# Patient Record
Sex: FEMALE | Race: WHITE | NOT HISPANIC OR LATINO | Employment: PART TIME | URBAN - METROPOLITAN AREA
[De-identification: names, ages, dates, MRNs, and addresses within clinical notes are randomized per-mention and may not be internally consistent; named-entity substitution may affect disease eponyms.]

---

## 2017-02-20 ENCOUNTER — COMMUNICATION - HEALTHEAST (OUTPATIENT)
Dept: FAMILY MEDICINE | Facility: CLINIC | Age: 32
End: 2017-02-20

## 2017-02-21 ENCOUNTER — COMMUNICATION - HEALTHEAST (OUTPATIENT)
Dept: FAMILY MEDICINE | Facility: CLINIC | Age: 32
End: 2017-02-21

## 2017-02-22 ENCOUNTER — OFFICE VISIT - HEALTHEAST (OUTPATIENT)
Dept: FAMILY MEDICINE | Facility: CLINIC | Age: 32
End: 2017-02-22

## 2017-02-22 DIAGNOSIS — R53.83 FATIGUE: ICD-10-CM

## 2017-02-22 DIAGNOSIS — T14.8XXA BRUISING: ICD-10-CM

## 2017-02-23 LAB
BASOPHILS # BLD AUTO: 0.1 THOU/UL (ref 0–0.2)
BASOPHILS NFR BLD AUTO: 1 % (ref 0–2)
EOSINOPHIL # BLD AUTO: 0.1 THOU/UL (ref 0–0.4)
EOSINOPHIL NFR BLD AUTO: 2 % (ref 0–6)
ERYTHROCYTE [DISTWIDTH] IN BLOOD BY AUTOMATED COUNT: 12.2 % (ref 11–14.5)
HCT VFR BLD AUTO: 43.2 % (ref 35–47)
HGB BLD-MCNC: 13.9 G/DL (ref 12–16)
LAB AP CHARGES (HE HISTORICAL CONVERSION): NORMAL
LYMPHOCYTES # BLD AUTO: 2.3 THOU/UL (ref 0.8–4.4)
LYMPHOCYTES NFR BLD AUTO: 32 % (ref 20–40)
MCH RBC QN AUTO: 29.8 PG (ref 27–34)
MCHC RBC AUTO-ENTMCNC: 32.2 G/DL (ref 32–36)
MCV RBC AUTO: 93 FL (ref 80–100)
MONOCYTES # BLD AUTO: 0.5 THOU/UL (ref 0–0.9)
MONOCYTES NFR BLD AUTO: 7 % (ref 2–10)
NEUTROPHILS # BLD AUTO: 4 THOU/UL (ref 2–7.7)
NEUTROPHILS NFR BLD AUTO: 58 % (ref 50–70)
PATH REPORT.COMMENTS IMP SPEC: NORMAL
PATH REPORT.COMMENTS IMP SPEC: NORMAL
PATH REPORT.FINAL DX SPEC: NORMAL
PATH REPORT.MICROSCOPIC SPEC OTHER STN: ABNORMAL
PATH REPORT.MICROSCOPIC SPEC OTHER STN: NORMAL
PATH REPORT.RELEVANT HX SPEC: NORMAL
PLATELET # BLD AUTO: 275 THOU/UL (ref 140–440)
PMV BLD AUTO: 9.7 FL (ref 8.5–12.5)
RBC # BLD AUTO: 4.67 MILL/UL (ref 3.8–5.4)
WBC: 7 THOU/UL (ref 4–11)

## 2017-03-27 ENCOUNTER — COMMUNICATION - HEALTHEAST (OUTPATIENT)
Dept: FAMILY MEDICINE | Facility: CLINIC | Age: 32
End: 2017-03-27

## 2017-03-28 ENCOUNTER — AMBULATORY - HEALTHEAST (OUTPATIENT)
Dept: BEHAVIORAL HEALTH | Facility: CLINIC | Age: 32
End: 2017-03-28

## 2017-05-09 ENCOUNTER — COMMUNICATION - HEALTHEAST (OUTPATIENT)
Dept: SCHEDULING | Facility: CLINIC | Age: 32
End: 2017-05-09

## 2017-05-16 ENCOUNTER — AMBULATORY - HEALTHEAST (OUTPATIENT)
Dept: FAMILY MEDICINE | Facility: CLINIC | Age: 32
End: 2017-05-16

## 2017-05-16 ENCOUNTER — COMMUNICATION - HEALTHEAST (OUTPATIENT)
Dept: SCHEDULING | Facility: CLINIC | Age: 32
End: 2017-05-16

## 2017-08-03 ENCOUNTER — COMMUNICATION - HEALTHEAST (OUTPATIENT)
Dept: FAMILY MEDICINE | Facility: CLINIC | Age: 32
End: 2017-08-03

## 2017-08-16 ENCOUNTER — COMMUNICATION - HEALTHEAST (OUTPATIENT)
Dept: FAMILY MEDICINE | Facility: CLINIC | Age: 32
End: 2017-08-16

## 2017-08-16 DIAGNOSIS — Z91.09 OTHER ALLERGY, OTHER THAN TO MEDICINAL AGENTS: ICD-10-CM

## 2017-11-15 ENCOUNTER — COMMUNICATION - HEALTHEAST (OUTPATIENT)
Dept: FAMILY MEDICINE | Facility: CLINIC | Age: 32
End: 2017-11-15

## 2017-11-20 ENCOUNTER — OFFICE VISIT - HEALTHEAST (OUTPATIENT)
Dept: FAMILY MEDICINE | Facility: CLINIC | Age: 32
End: 2017-11-20

## 2017-11-20 DIAGNOSIS — N92.6 MISSED MENSES: ICD-10-CM

## 2017-11-21 ENCOUNTER — RECORDS - HEALTHEAST (OUTPATIENT)
Dept: ADMINISTRATIVE | Facility: OTHER | Age: 32
End: 2017-11-21

## 2017-11-21 ENCOUNTER — HOSPITAL ENCOUNTER (OUTPATIENT)
Dept: ULTRASOUND IMAGING | Facility: HOSPITAL | Age: 32
Discharge: HOME | End: 2017-11-21
Attending: FAMILY MEDICINE

## 2017-11-21 DIAGNOSIS — N92.6 MISSED MENSES: ICD-10-CM

## 2017-11-22 ENCOUNTER — AMBULATORY - HEALTHEAST (OUTPATIENT)
Dept: FAMILY MEDICINE | Facility: CLINIC | Age: 32
End: 2017-11-22

## 2017-11-22 DIAGNOSIS — N92.6 MISSED MENSES: ICD-10-CM

## 2017-12-07 ENCOUNTER — HOSPITAL ENCOUNTER (OUTPATIENT)
Dept: ULTRASOUND IMAGING | Facility: HOSPITAL | Age: 32
Discharge: HOME | End: 2017-12-07
Attending: FAMILY MEDICINE

## 2017-12-07 DIAGNOSIS — N92.6 MISSED MENSES: ICD-10-CM

## 2018-01-03 ENCOUNTER — RECORDS - HEALTHEAST (OUTPATIENT)
Dept: ADMINISTRATIVE | Facility: OTHER | Age: 33
End: 2018-01-03

## 2018-01-03 ENCOUNTER — PRENATAL OFFICE VISIT - HEALTHEAST (OUTPATIENT)
Dept: FAMILY MEDICINE | Facility: CLINIC | Age: 33
End: 2018-01-03

## 2018-01-03 DIAGNOSIS — Z34.90 PREGNANCY: ICD-10-CM

## 2018-01-03 LAB
ALBUMIN UR-MCNC: ABNORMAL MG/DL
APPEARANCE UR: CLEAR
BASOPHILS # BLD AUTO: 0 THOU/UL (ref 0–0.2)
BASOPHILS NFR BLD AUTO: 1 % (ref 0–2)
BILIRUB UR QL STRIP: NEGATIVE
CLUE CELLS: NORMAL
COLOR UR AUTO: YELLOW
EOSINOPHIL # BLD AUTO: 0.1 THOU/UL (ref 0–0.4)
EOSINOPHIL NFR BLD AUTO: 1 % (ref 0–6)
ERYTHROCYTE [DISTWIDTH] IN BLOOD BY AUTOMATED COUNT: 11.9 % (ref 11–14.5)
GLUCOSE UR STRIP-MCNC: NEGATIVE MG/DL
HCT VFR BLD AUTO: 38.9 % (ref 35–47)
HGB BLD-MCNC: 13.1 G/DL (ref 12–16)
HGB UR QL STRIP: NEGATIVE
HIV 1+2 AB+HIV1 P24 AG SERPL QL IA: NEGATIVE
KETONES UR STRIP-MCNC: NEGATIVE MG/DL
LEUKOCYTE ESTERASE UR QL STRIP: NEGATIVE
LYMPHOCYTES # BLD AUTO: 1.9 THOU/UL (ref 0.8–4.4)
LYMPHOCYTES NFR BLD AUTO: 23 % (ref 20–40)
MCH RBC QN AUTO: 30.8 PG (ref 27–34)
MCHC RBC AUTO-ENTMCNC: 33.7 G/DL (ref 32–36)
MCV RBC AUTO: 92 FL (ref 80–100)
MONOCYTES # BLD AUTO: 0.5 THOU/UL (ref 0–0.9)
MONOCYTES NFR BLD AUTO: 6 % (ref 2–10)
NEUTROPHILS # BLD AUTO: 5.7 THOU/UL (ref 2–7.7)
NEUTROPHILS NFR BLD AUTO: 70 % (ref 50–70)
NITRATE UR QL: NEGATIVE
PH UR STRIP: 7 [PH] (ref 5–8)
PLATELET # BLD AUTO: 221 THOU/UL (ref 140–440)
PMV BLD AUTO: 9.7 FL (ref 8.5–12.5)
RBC # BLD AUTO: 4.25 MILL/UL (ref 3.8–5.4)
SP GR UR STRIP: 1.02 (ref 1–1.03)
TRICHOMONAS, WET PREP: NORMAL
UROBILINOGEN UR STRIP-ACNC: ABNORMAL
WBC: 8.2 THOU/UL (ref 4–11)
YEAST, WET PREP: NORMAL

## 2018-01-04 LAB
25(OH)D3 SERPL-MCNC: 32.4 NG/ML (ref 30–80)
25(OH)D3 SERPL-MCNC: 32.4 NG/ML (ref 30–80)
ABO/RH(D): NORMAL
ABORH REPEAT: NORMAL
ANTIBODY SCREEN: NEGATIVE
BACTERIA SPEC CULT: NO GROWTH
C TRACH DNA SPEC QL PROBE+SIG AMP: NEGATIVE
HBV SURFACE AG SERPL QL IA: NEGATIVE
N GONORRHOEA DNA SPEC QL NAA+PROBE: NEGATIVE
RUBV IGG SERPL QL IA: NORMAL
SYPHILIS RPR SCREEN - HISTORICAL: NORMAL

## 2018-01-08 ENCOUNTER — COMMUNICATION - HEALTHEAST (OUTPATIENT)
Dept: FAMILY MEDICINE | Facility: CLINIC | Age: 33
End: 2018-01-08

## 2018-01-08 LAB
HUMAN PAPILLOMA VIRUS 16 DNA: NEGATIVE
HUMAN PAPILLOMA VIRUS 18 DNA: NEGATIVE
HUMAN PAPILLOMA VIRUS FINAL DIAGNOSIS: ABNORMAL
HUMAN PAPILLOMA VIRUS OTHER HR: POSITIVE
SPECIMEN DESCRIPTION: ABNORMAL

## 2018-01-09 LAB
BKR LAB AP ABNORMAL BLEEDING: NO
BKR LAB AP BIRTH CONTROL/HORMONES: NORMAL
BKR LAB AP CERVICAL APPEARANCE: NORMAL
BKR LAB AP GYN ADEQUACY: NORMAL
BKR LAB AP GYN INTERPRETATION: NORMAL
BKR LAB AP HPV REFLEX: NORMAL
BKR LAB AP LMP: NORMAL
BKR LAB AP PATIENT STATUS: NORMAL
BKR LAB AP PREVIOUS ABNORMAL: NO
BKR LAB AP PREVIOUS NORMAL: NORMAL
HIGH RISK?: NO
PATH REPORT.COMMENTS IMP SPEC: NORMAL
RESULT FLAG (HE HISTORICAL CONVERSION): NORMAL

## 2018-01-10 ENCOUNTER — COMMUNICATION - HEALTHEAST (OUTPATIENT)
Dept: FAMILY MEDICINE | Facility: CLINIC | Age: 33
End: 2018-01-10

## 2018-01-16 ENCOUNTER — COMMUNICATION - HEALTHEAST (OUTPATIENT)
Dept: FAMILY MEDICINE | Facility: CLINIC | Age: 33
End: 2018-01-16

## 2018-02-01 ENCOUNTER — PRENATAL OFFICE VISIT - HEALTHEAST (OUTPATIENT)
Dept: FAMILY MEDICINE | Facility: CLINIC | Age: 33
End: 2018-02-01

## 2018-02-01 DIAGNOSIS — Z34.90 PREGNANCY: ICD-10-CM

## 2018-02-01 LAB
ALBUMIN UR-MCNC: NEGATIVE MG/DL
APPEARANCE UR: CLEAR
BILIRUB UR QL STRIP: NEGATIVE
COLOR UR AUTO: YELLOW
GLUCOSE UR STRIP-MCNC: NEGATIVE MG/DL
HGB UR QL STRIP: NEGATIVE
KETONES UR STRIP-MCNC: NEGATIVE MG/DL
LEUKOCYTE ESTERASE UR QL STRIP: NEGATIVE
NITRATE UR QL: NEGATIVE
PH UR STRIP: 7 [PH] (ref 5–8)
SP GR UR STRIP: 1.01 (ref 1–1.03)
UROBILINOGEN UR STRIP-ACNC: NORMAL

## 2018-02-05 LAB
ALPHA FETO PROTEIN: NORMAL
CALCULATED AGE AT EDD: NORMAL
EDD BY U/S SCAN: NORMAL
GA ON COLLECTION BY U/S SCAN: NORMAL WK,D
GA USED IN RISK ESTIMATE: NORMAL
GENERAL TEST INFORMATION: NORMAL
INSULIN DIABETIC: NORMAL
INTERPRETATION: NORMAL
OTHER INFORMATION: NORMAL
PATIENT WEIGHT: 205 LBS
RACE OF MOTHER: NORMAL
RECOMMENDED FOLLOW UP: NORMAL

## 2018-02-22 ENCOUNTER — RECORDS - HEALTHEAST (OUTPATIENT)
Dept: ADMINISTRATIVE | Facility: OTHER | Age: 33
End: 2018-02-22

## 2018-03-01 ENCOUNTER — PRENATAL OFFICE VISIT - HEALTHEAST (OUTPATIENT)
Dept: FAMILY MEDICINE | Facility: CLINIC | Age: 33
End: 2018-03-01

## 2018-03-01 DIAGNOSIS — Z34.90 PREGNANCY: ICD-10-CM

## 2018-03-06 ENCOUNTER — OFFICE VISIT - HEALTHEAST (OUTPATIENT)
Dept: FAMILY MEDICINE | Facility: CLINIC | Age: 33
End: 2018-03-06

## 2018-03-06 DIAGNOSIS — J06.9 URI WITH COUGH AND CONGESTION: ICD-10-CM

## 2018-03-15 ENCOUNTER — RECORDS - HEALTHEAST (OUTPATIENT)
Dept: ADMINISTRATIVE | Facility: OTHER | Age: 33
End: 2018-03-15

## 2018-03-29 ENCOUNTER — PRENATAL OFFICE VISIT - HEALTHEAST (OUTPATIENT)
Dept: FAMILY MEDICINE | Facility: CLINIC | Age: 33
End: 2018-03-29

## 2018-03-29 DIAGNOSIS — Z34.90 PREGNANCY: ICD-10-CM

## 2018-03-29 LAB
FASTING STATUS PATIENT QL REPORTED: NO
GLUCOSE 1H P 50 G GLC PO SERPL-MCNC: 115 MG/DL (ref 70–139)
HGB BLD-MCNC: 12.2 G/DL (ref 12–16)

## 2018-03-30 LAB — T PALLIDUM AB SER QL: NEGATIVE

## 2018-05-10 ENCOUNTER — PRENATAL OFFICE VISIT - HEALTHEAST (OUTPATIENT)
Dept: FAMILY MEDICINE | Facility: CLINIC | Age: 33
End: 2018-05-10

## 2018-05-10 DIAGNOSIS — Z34.90 PREGNANCY: ICD-10-CM

## 2018-05-24 ENCOUNTER — PRENATAL OFFICE VISIT - HEALTHEAST (OUTPATIENT)
Dept: FAMILY MEDICINE | Facility: CLINIC | Age: 33
End: 2018-05-24

## 2018-05-24 DIAGNOSIS — Z34.90 PREGNANCY: ICD-10-CM

## 2018-05-24 LAB
ALBUMIN UR-MCNC: NEGATIVE MG/DL
APPEARANCE UR: CLEAR
BILIRUB UR QL STRIP: NEGATIVE
COLOR UR AUTO: YELLOW
GLUCOSE UR STRIP-MCNC: NEGATIVE MG/DL
HGB UR QL STRIP: ABNORMAL
KETONES UR STRIP-MCNC: NEGATIVE MG/DL
LEUKOCYTE ESTERASE UR QL STRIP: NEGATIVE
NITRATE UR QL: NEGATIVE
PH UR STRIP: 7 [PH] (ref 5–8)
SP GR UR STRIP: 1.01 (ref 1–1.03)
UROBILINOGEN UR STRIP-ACNC: ABNORMAL

## 2018-06-07 ENCOUNTER — PRENATAL OFFICE VISIT - HEALTHEAST (OUTPATIENT)
Dept: FAMILY MEDICINE | Facility: CLINIC | Age: 33
End: 2018-06-07

## 2018-06-07 DIAGNOSIS — Z34.90 PREGNANCY: ICD-10-CM

## 2018-06-21 ENCOUNTER — PRENATAL OFFICE VISIT - HEALTHEAST (OUTPATIENT)
Dept: FAMILY MEDICINE | Facility: CLINIC | Age: 33
End: 2018-06-21

## 2018-06-21 ENCOUNTER — AMBULATORY - HEALTHEAST (OUTPATIENT)
Dept: FAMILY MEDICINE | Facility: CLINIC | Age: 33
End: 2018-06-21

## 2018-06-21 ENCOUNTER — HOSPITAL ENCOUNTER (OUTPATIENT)
Dept: ULTRASOUND IMAGING | Facility: HOSPITAL | Age: 33
Discharge: HOME | End: 2018-06-21
Attending: FAMILY MEDICINE

## 2018-06-21 DIAGNOSIS — Z34.90 PREGNANCY: ICD-10-CM

## 2018-06-23 LAB
ALLERGIC TO PENICILLIN: NO
GP B STREP DNA SPEC QL NAA+PROBE: NEGATIVE

## 2018-06-25 ENCOUNTER — COMMUNICATION - HEALTHEAST (OUTPATIENT)
Dept: FAMILY MEDICINE | Facility: CLINIC | Age: 33
End: 2018-06-25

## 2018-06-25 ENCOUNTER — HOSPITAL ENCOUNTER (OUTPATIENT)
Dept: ULTRASOUND IMAGING | Facility: HOSPITAL | Age: 33
Discharge: HOME | End: 2018-06-25
Attending: FAMILY MEDICINE

## 2018-06-25 DIAGNOSIS — Z34.90 PREGNANCY: ICD-10-CM

## 2018-06-27 ENCOUNTER — PRENATAL OFFICE VISIT - HEALTHEAST (OUTPATIENT)
Dept: FAMILY MEDICINE | Facility: CLINIC | Age: 33
End: 2018-06-27

## 2018-06-27 DIAGNOSIS — Z34.90 PREGNANCY: ICD-10-CM

## 2018-06-27 LAB
ALBUMIN UR-MCNC: NEGATIVE MG/DL
APPEARANCE UR: CLEAR
BILIRUB UR QL STRIP: NEGATIVE
COLOR UR AUTO: YELLOW
GLUCOSE UR STRIP-MCNC: ABNORMAL MG/DL
HGB BLD-MCNC: 12.6 G/DL (ref 12–16)
HGB UR QL STRIP: ABNORMAL
KETONES UR STRIP-MCNC: NEGATIVE MG/DL
LEUKOCYTE ESTERASE UR QL STRIP: NEGATIVE
NITRATE UR QL: NEGATIVE
PH UR STRIP: 7 [PH] (ref 5–8)
SP GR UR STRIP: 1.01 (ref 1–1.03)
UROBILINOGEN UR STRIP-ACNC: ABNORMAL

## 2018-07-02 ENCOUNTER — COMMUNICATION - HEALTHEAST (OUTPATIENT)
Dept: FAMILY MEDICINE | Facility: CLINIC | Age: 33
End: 2018-07-02

## 2018-07-02 ENCOUNTER — PRENATAL OFFICE VISIT - HEALTHEAST (OUTPATIENT)
Dept: FAMILY MEDICINE | Facility: CLINIC | Age: 33
End: 2018-07-02

## 2018-07-02 ENCOUNTER — HOSPITAL ENCOUNTER (OUTPATIENT)
Dept: ULTRASOUND IMAGING | Facility: CLINIC | Age: 33
Discharge: HOME | End: 2018-07-02
Attending: FAMILY MEDICINE

## 2018-07-02 DIAGNOSIS — Z34.90 PREGNANCY: ICD-10-CM

## 2018-07-02 LAB
ALBUMIN UR-MCNC: NEGATIVE MG/DL
APPEARANCE UR: CLEAR
BILIRUB UR QL STRIP: NEGATIVE
COLOR UR AUTO: YELLOW
GLUCOSE UR STRIP-MCNC: NEGATIVE MG/DL
HGB UR QL STRIP: ABNORMAL
KETONES UR STRIP-MCNC: ABNORMAL MG/DL
LEUKOCYTE ESTERASE UR QL STRIP: NEGATIVE
NITRATE UR QL: NEGATIVE
PH UR STRIP: 7 [PH] (ref 5–8)
SP GR UR STRIP: 1.02 (ref 1–1.03)
UROBILINOGEN UR STRIP-ACNC: ABNORMAL

## 2018-07-09 ENCOUNTER — PRENATAL OFFICE VISIT - HEALTHEAST (OUTPATIENT)
Dept: FAMILY MEDICINE | Facility: CLINIC | Age: 33
End: 2018-07-09

## 2018-07-09 ENCOUNTER — HOSPITAL ENCOUNTER (OUTPATIENT)
Dept: ULTRASOUND IMAGING | Facility: HOSPITAL | Age: 33
Discharge: HOME | End: 2018-07-09
Attending: FAMILY MEDICINE

## 2018-07-09 DIAGNOSIS — Z34.90 PREGNANCY: ICD-10-CM

## 2018-07-09 LAB
ALBUMIN UR-MCNC: NEGATIVE MG/DL
APPEARANCE UR: CLEAR
BILIRUB UR QL STRIP: NEGATIVE
COLOR UR AUTO: YELLOW
GLUCOSE UR STRIP-MCNC: ABNORMAL MG/DL
HGB UR QL STRIP: ABNORMAL
KETONES UR STRIP-MCNC: NEGATIVE MG/DL
LEUKOCYTE ESTERASE UR QL STRIP: NEGATIVE
NITRATE UR QL: NEGATIVE
PH UR STRIP: 7 [PH] (ref 5–8)
SP GR UR STRIP: 1.01 (ref 1–1.03)
UROBILINOGEN UR STRIP-ACNC: ABNORMAL

## 2018-07-12 ENCOUNTER — PRENATAL OFFICE VISIT - HEALTHEAST (OUTPATIENT)
Dept: FAMILY MEDICINE | Facility: CLINIC | Age: 33
End: 2018-07-12

## 2018-07-12 DIAGNOSIS — O32.0XX0: ICD-10-CM

## 2018-07-12 DIAGNOSIS — Z34.90 PREGNANCY: ICD-10-CM

## 2018-07-13 ENCOUNTER — COMMUNICATION - HEALTHEAST (OUTPATIENT)
Dept: FAMILY MEDICINE | Facility: CLINIC | Age: 33
End: 2018-07-13

## 2018-07-19 ENCOUNTER — HOME CARE/HOSPICE - HEALTHEAST (OUTPATIENT)
Dept: HOME HEALTH SERVICES | Facility: HOME HEALTH | Age: 33
End: 2018-07-19

## 2018-07-20 ENCOUNTER — HOME CARE/HOSPICE - HEALTHEAST (OUTPATIENT)
Dept: HOME HEALTH SERVICES | Facility: HOME HEALTH | Age: 33
End: 2018-07-20

## 2018-07-26 ENCOUNTER — COMMUNICATION - HEALTHEAST (OUTPATIENT)
Dept: TELEHEALTH | Facility: CLINIC | Age: 33
End: 2018-07-26

## 2018-07-26 ENCOUNTER — COMMUNICATION - HEALTHEAST (OUTPATIENT)
Dept: HEALTH INFORMATION MANAGEMENT | Facility: CLINIC | Age: 33
End: 2018-07-26

## 2018-07-26 ENCOUNTER — AMBULATORY - HEALTHEAST (OUTPATIENT)
Dept: OBGYN | Facility: HOSPITAL | Age: 33
End: 2018-07-26

## 2018-07-30 ENCOUNTER — COMMUNICATION - HEALTHEAST (OUTPATIENT)
Dept: FAMILY MEDICINE | Facility: CLINIC | Age: 33
End: 2018-07-30

## 2018-07-30 ENCOUNTER — COMMUNICATION - HEALTHEAST (OUTPATIENT)
Dept: OBGYN | Facility: HOSPITAL | Age: 33
End: 2018-07-30

## 2018-07-31 ENCOUNTER — AMBULATORY - HEALTHEAST (OUTPATIENT)
Dept: FAMILY MEDICINE | Facility: CLINIC | Age: 33
End: 2018-07-31

## 2018-07-31 ENCOUNTER — COMMUNICATION - HEALTHEAST (OUTPATIENT)
Dept: OBGYN | Facility: HOSPITAL | Age: 33
End: 2018-07-31

## 2018-08-02 ENCOUNTER — COMMUNICATION - HEALTHEAST (OUTPATIENT)
Dept: OBGYN | Facility: HOSPITAL | Age: 33
End: 2018-08-02

## 2018-08-08 ENCOUNTER — COMMUNICATION - HEALTHEAST (OUTPATIENT)
Dept: OBGYN | Facility: CLINIC | Age: 33
End: 2018-08-08

## 2018-08-15 ENCOUNTER — COMMUNICATION - HEALTHEAST (OUTPATIENT)
Dept: OBGYN | Facility: CLINIC | Age: 33
End: 2018-08-15

## 2018-08-16 ENCOUNTER — COMMUNICATION - HEALTHEAST (OUTPATIENT)
Dept: OBGYN | Facility: HOSPITAL | Age: 33
End: 2018-08-16

## 2018-08-16 ENCOUNTER — COMMUNICATION - HEALTHEAST (OUTPATIENT)
Dept: FAMILY MEDICINE | Facility: CLINIC | Age: 33
End: 2018-08-16

## 2018-08-23 ENCOUNTER — OFFICE VISIT - HEALTHEAST (OUTPATIENT)
Dept: FAMILY MEDICINE | Facility: CLINIC | Age: 33
End: 2018-08-23

## 2018-08-23 ENCOUNTER — COMMUNICATION - HEALTHEAST (OUTPATIENT)
Dept: TELEHEALTH | Facility: CLINIC | Age: 33
End: 2018-08-23

## 2018-08-23 DIAGNOSIS — F41.8 POSTPARTUM ANXIETY: ICD-10-CM

## 2018-08-23 DIAGNOSIS — D22.9 ATYPICAL NEVI: ICD-10-CM

## 2018-08-24 ENCOUNTER — COMMUNICATION - HEALTHEAST (OUTPATIENT)
Dept: FAMILY MEDICINE | Facility: CLINIC | Age: 33
End: 2018-08-24

## 2018-08-27 ENCOUNTER — COMMUNICATION - HEALTHEAST (OUTPATIENT)
Dept: FAMILY MEDICINE | Facility: CLINIC | Age: 33
End: 2018-08-27

## 2018-08-27 LAB
HPV SOURCE: ABNORMAL
HUMAN PAPILLOMA VIRUS 16 DNA: NEGATIVE
HUMAN PAPILLOMA VIRUS 18 DNA: NEGATIVE
HUMAN PAPILLOMA VIRUS FINAL DIAGNOSIS: ABNORMAL
HUMAN PAPILLOMA VIRUS OTHER HR: POSITIVE
SPECIMEN DESCRIPTION: ABNORMAL

## 2018-08-30 ENCOUNTER — COMMUNICATION - HEALTHEAST (OUTPATIENT)
Dept: FAMILY MEDICINE | Facility: CLINIC | Age: 33
End: 2018-08-30

## 2018-09-19 ENCOUNTER — AMBULATORY - HEALTHEAST (OUTPATIENT)
Dept: FAMILY MEDICINE | Facility: CLINIC | Age: 33
End: 2018-09-19

## 2018-09-21 ENCOUNTER — COMMUNICATION - HEALTHEAST (OUTPATIENT)
Dept: FAMILY MEDICINE | Facility: CLINIC | Age: 33
End: 2018-09-21

## 2018-09-26 ENCOUNTER — RECORDS - HEALTHEAST (OUTPATIENT)
Dept: GENERAL RADIOLOGY | Facility: CLINIC | Age: 33
End: 2018-09-26

## 2018-09-26 ENCOUNTER — OFFICE VISIT - HEALTHEAST (OUTPATIENT)
Dept: FAMILY MEDICINE | Facility: CLINIC | Age: 33
End: 2018-09-26

## 2018-09-26 DIAGNOSIS — M25.562 LEFT KNEE PAIN: ICD-10-CM

## 2018-09-26 DIAGNOSIS — M25.562 PAIN IN LEFT KNEE: ICD-10-CM

## 2018-10-31 ENCOUNTER — RECORDS - HEALTHEAST (OUTPATIENT)
Dept: ADMINISTRATIVE | Facility: OTHER | Age: 33
End: 2018-10-31

## 2019-01-21 ENCOUNTER — AMBULATORY - HEALTHEAST (OUTPATIENT)
Dept: FAMILY MEDICINE | Facility: CLINIC | Age: 34
End: 2019-01-21

## 2019-02-10 ENCOUNTER — COMMUNICATION - HEALTHEAST (OUTPATIENT)
Dept: FAMILY MEDICINE | Facility: CLINIC | Age: 34
End: 2019-02-10

## 2019-02-10 DIAGNOSIS — F41.9 ANXIETY: ICD-10-CM

## 2019-03-05 ENCOUNTER — OFFICE VISIT - HEALTHEAST (OUTPATIENT)
Dept: FAMILY MEDICINE | Facility: CLINIC | Age: 34
End: 2019-03-05

## 2019-03-05 DIAGNOSIS — R09.81 CONGESTION OF PARANASAL SINUS: ICD-10-CM

## 2019-03-14 ENCOUNTER — COMMUNICATION - HEALTHEAST (OUTPATIENT)
Dept: FAMILY MEDICINE | Facility: CLINIC | Age: 34
End: 2019-03-14

## 2019-03-14 DIAGNOSIS — F41.9 ANXIETY: ICD-10-CM

## 2019-04-13 ENCOUNTER — OFFICE VISIT - HEALTHEAST (OUTPATIENT)
Dept: FAMILY MEDICINE | Facility: CLINIC | Age: 34
End: 2019-04-13

## 2019-04-13 DIAGNOSIS — J01.00 SUBACUTE MAXILLARY SINUSITIS: ICD-10-CM

## 2019-04-13 DIAGNOSIS — H65.191 ACUTE OTITIS MEDIA WITH EFFUSION OF RIGHT EAR: ICD-10-CM

## 2019-04-21 ENCOUNTER — COMMUNICATION - HEALTHEAST (OUTPATIENT)
Dept: FAMILY MEDICINE | Facility: CLINIC | Age: 34
End: 2019-04-21

## 2019-04-21 DIAGNOSIS — F41.9 ANXIETY: ICD-10-CM

## 2019-05-07 ENCOUNTER — COMMUNICATION - HEALTHEAST (OUTPATIENT)
Dept: FAMILY MEDICINE | Facility: CLINIC | Age: 34
End: 2019-05-07

## 2019-05-13 ENCOUNTER — AMBULATORY - HEALTHEAST (OUTPATIENT)
Dept: FAMILY MEDICINE | Facility: CLINIC | Age: 34
End: 2019-05-13

## 2019-05-13 ENCOUNTER — COMMUNICATION - HEALTHEAST (OUTPATIENT)
Dept: FAMILY MEDICINE | Facility: CLINIC | Age: 34
End: 2019-05-13

## 2019-05-13 DIAGNOSIS — Z91.09 ENVIRONMENTAL ALLERGIES: ICD-10-CM

## 2019-05-19 ENCOUNTER — OFFICE VISIT - HEALTHEAST (OUTPATIENT)
Dept: FAMILY MEDICINE | Facility: CLINIC | Age: 34
End: 2019-05-19

## 2019-05-19 DIAGNOSIS — J06.9 URI WITH COUGH AND CONGESTION: ICD-10-CM

## 2019-05-23 ENCOUNTER — OFFICE VISIT - HEALTHEAST (OUTPATIENT)
Dept: ALLERGY | Facility: CLINIC | Age: 34
End: 2019-05-23

## 2019-05-23 DIAGNOSIS — J30.1 ALLERGIC RHINITIS DUE TO POLLEN, UNSPECIFIED SEASONALITY: ICD-10-CM

## 2019-05-23 ASSESSMENT — MIFFLIN-ST. JEOR: SCORE: 1593.09

## 2019-05-24 ENCOUNTER — COMMUNICATION - HEALTHEAST (OUTPATIENT)
Dept: ALLERGY | Facility: CLINIC | Age: 34
End: 2019-05-24

## 2019-06-14 ENCOUNTER — RECORDS - HEALTHEAST (OUTPATIENT)
Dept: ADMINISTRATIVE | Facility: OTHER | Age: 34
End: 2019-06-14

## 2019-08-21 ENCOUNTER — OFFICE VISIT - HEALTHEAST (OUTPATIENT)
Dept: FAMILY MEDICINE | Facility: CLINIC | Age: 34
End: 2019-08-21

## 2019-08-21 DIAGNOSIS — N92.0 UNUSUALLY FREQUENT MENSES: ICD-10-CM

## 2019-08-21 DIAGNOSIS — D22.9 CHANGE IN MOLE: ICD-10-CM

## 2019-08-21 DIAGNOSIS — Z00.00 ROUTINE GENERAL MEDICAL EXAMINATION AT A HEALTH CARE FACILITY: ICD-10-CM

## 2019-08-21 DIAGNOSIS — E78.5 HYPERLIPIDEMIA, UNSPECIFIED HYPERLIPIDEMIA TYPE: ICD-10-CM

## 2019-08-21 LAB
ALBUMIN SERPL-MCNC: 3.7 G/DL (ref 3.5–5)
ALP SERPL-CCNC: 60 U/L (ref 45–120)
ALT SERPL W P-5'-P-CCNC: 14 U/L (ref 0–45)
ANION GAP SERPL CALCULATED.3IONS-SCNC: 6 MMOL/L (ref 5–18)
AST SERPL W P-5'-P-CCNC: 16 U/L (ref 0–40)
BILIRUB SERPL-MCNC: 0.3 MG/DL (ref 0–1)
BUN SERPL-MCNC: 10 MG/DL (ref 8–22)
CALCIUM SERPL-MCNC: 9.5 MG/DL (ref 8.5–10.5)
CHLORIDE BLD-SCNC: 105 MMOL/L (ref 98–107)
CHOLEST SERPL-MCNC: 185 MG/DL
CO2 SERPL-SCNC: 27 MMOL/L (ref 22–31)
CREAT SERPL-MCNC: 0.76 MG/DL (ref 0.6–1.1)
ERYTHROCYTE [DISTWIDTH] IN BLOOD BY AUTOMATED COUNT: 11.2 % (ref 11–14.5)
FASTING STATUS PATIENT QL REPORTED: YES
GFR SERPL CREATININE-BSD FRML MDRD: >60 ML/MIN/1.73M2
GLUCOSE BLD-MCNC: 91 MG/DL (ref 70–125)
HCT VFR BLD AUTO: 40.1 % (ref 35–47)
HDLC SERPL-MCNC: 67 MG/DL
HGB BLD-MCNC: 13.5 G/DL (ref 12–16)
LDLC SERPL CALC-MCNC: 104 MG/DL
MCH RBC QN AUTO: 30.3 PG (ref 27–34)
MCHC RBC AUTO-ENTMCNC: 33.7 G/DL (ref 32–36)
MCV RBC AUTO: 90 FL (ref 80–100)
PLATELET # BLD AUTO: 245 THOU/UL (ref 140–440)
PMV BLD AUTO: 7.2 FL (ref 7–10)
POTASSIUM BLD-SCNC: 4.2 MMOL/L (ref 3.5–5)
PROLACTIN SERPL-MCNC: 4.2 NG/ML (ref 0–20)
PROT SERPL-MCNC: 7 G/DL (ref 6–8)
RBC # BLD AUTO: 4.46 MILL/UL (ref 3.8–5.4)
SODIUM SERPL-SCNC: 138 MMOL/L (ref 136–145)
TRIGL SERPL-MCNC: 70 MG/DL
TSH SERPL DL<=0.005 MIU/L-ACNC: 0.66 UIU/ML (ref 0.3–5)
WBC: 7.4 THOU/UL (ref 4–11)

## 2019-08-21 ASSESSMENT — MIFFLIN-ST. JEOR: SCORE: 1592.86

## 2019-08-22 LAB
HBV SURFACE AB SERPL IA-ACNC: POSITIVE M[IU]/ML
HPV SOURCE: ABNORMAL
HUMAN PAPILLOMA VIRUS 16 DNA: NEGATIVE
HUMAN PAPILLOMA VIRUS 18 DNA: NEGATIVE
HUMAN PAPILLOMA VIRUS FINAL DIAGNOSIS: ABNORMAL
HUMAN PAPILLOMA VIRUS OTHER HR: POSITIVE
SPECIMEN DESCRIPTION: ABNORMAL

## 2019-08-28 LAB

## 2019-08-30 ENCOUNTER — HOSPITAL ENCOUNTER (OUTPATIENT)
Dept: ULTRASOUND IMAGING | Facility: HOSPITAL | Age: 34
Discharge: HOME | End: 2019-08-30
Attending: FAMILY MEDICINE

## 2019-08-30 DIAGNOSIS — N92.0 UNUSUALLY FREQUENT MENSES: ICD-10-CM

## 2019-10-17 ENCOUNTER — AMBULATORY - HEALTHEAST (OUTPATIENT)
Dept: NURSING | Facility: CLINIC | Age: 34
End: 2019-10-17

## 2019-11-05 ENCOUNTER — OFFICE VISIT - HEALTHEAST (OUTPATIENT)
Dept: FAMILY MEDICINE | Facility: CLINIC | Age: 34
End: 2019-11-05

## 2019-11-05 DIAGNOSIS — J01.10 ACUTE NON-RECURRENT FRONTAL SINUSITIS: ICD-10-CM

## 2020-01-24 ENCOUNTER — RECORDS - HEALTHEAST (OUTPATIENT)
Dept: ADMINISTRATIVE | Facility: OTHER | Age: 35
End: 2020-01-24

## 2020-02-24 ENCOUNTER — OFFICE VISIT - HEALTHEAST (OUTPATIENT)
Dept: FAMILY MEDICINE | Facility: CLINIC | Age: 35
End: 2020-02-24

## 2020-02-24 DIAGNOSIS — R87.810 CERVICAL HIGH RISK HPV (HUMAN PAPILLOMAVIRUS) TEST POSITIVE: ICD-10-CM

## 2020-02-24 DIAGNOSIS — R05.9 COUGH: ICD-10-CM

## 2020-02-24 DIAGNOSIS — J01.00 ACUTE NON-RECURRENT MAXILLARY SINUSITIS: ICD-10-CM

## 2020-02-24 RX ORDER — LORATADINE 10 MG/1
10 TABLET ORAL DAILY
Status: SHIPPED | COMMUNITY
Start: 2020-02-24

## 2020-03-04 ENCOUNTER — COMMUNICATION - HEALTHEAST (OUTPATIENT)
Dept: FAMILY MEDICINE | Facility: CLINIC | Age: 35
End: 2020-03-04

## 2020-03-12 ENCOUNTER — AMBULATORY - HEALTHEAST (OUTPATIENT)
Dept: FAMILY MEDICINE | Facility: CLINIC | Age: 35
End: 2020-03-12

## 2020-03-12 DIAGNOSIS — R87.810 CERVICAL HIGH RISK HPV (HUMAN PAPILLOMAVIRUS) TEST POSITIVE: ICD-10-CM

## 2020-03-12 LAB — HCG UR QL: NEGATIVE

## 2020-03-12 ASSESSMENT — MIFFLIN-ST. JEOR: SCORE: 1655.51

## 2020-03-25 ENCOUNTER — COMMUNICATION - HEALTHEAST (OUTPATIENT)
Dept: ALLERGY | Facility: CLINIC | Age: 35
End: 2020-03-25

## 2020-03-26 ENCOUNTER — COMMUNICATION - HEALTHEAST (OUTPATIENT)
Dept: ALLERGY | Facility: CLINIC | Age: 35
End: 2020-03-26

## 2020-03-26 DIAGNOSIS — J30.1 ALLERGIC RHINITIS DUE TO POLLEN, UNSPECIFIED SEASONALITY: ICD-10-CM

## 2020-05-25 ENCOUNTER — COMMUNICATION - HEALTHEAST (OUTPATIENT)
Dept: ALLERGY | Facility: CLINIC | Age: 35
End: 2020-05-25

## 2020-05-25 DIAGNOSIS — J30.1 ALLERGIC RHINITIS DUE TO POLLEN, UNSPECIFIED SEASONALITY: ICD-10-CM

## 2020-06-22 ENCOUNTER — COMMUNICATION - HEALTHEAST (OUTPATIENT)
Dept: ALLERGY | Facility: CLINIC | Age: 35
End: 2020-06-22

## 2020-06-22 ENCOUNTER — AMBULATORY - HEALTHEAST (OUTPATIENT)
Dept: ALLERGY | Facility: CLINIC | Age: 35
End: 2020-06-22

## 2020-06-22 DIAGNOSIS — J30.1 ALLERGIC RHINITIS DUE TO POLLEN, UNSPECIFIED SEASONALITY: ICD-10-CM

## 2020-07-07 ENCOUNTER — OFFICE VISIT - HEALTHEAST (OUTPATIENT)
Dept: ALLERGY | Facility: CLINIC | Age: 35
End: 2020-07-07

## 2020-07-07 DIAGNOSIS — J30.1 ALLERGIC RHINITIS DUE TO POLLEN, UNSPECIFIED SEASONALITY: ICD-10-CM

## 2020-07-28 ENCOUNTER — OFFICE VISIT - HEALTHEAST (OUTPATIENT)
Dept: FAMILY MEDICINE | Facility: CLINIC | Age: 35
End: 2020-07-28

## 2020-07-28 DIAGNOSIS — S39.012A STRAIN OF LUMBAR REGION, INITIAL ENCOUNTER: ICD-10-CM

## 2020-09-19 ENCOUNTER — COMMUNICATION - HEALTHEAST (OUTPATIENT)
Dept: FAMILY MEDICINE | Facility: CLINIC | Age: 35
End: 2020-09-19

## 2020-09-21 ENCOUNTER — COMMUNICATION - HEALTHEAST (OUTPATIENT)
Dept: FAMILY MEDICINE | Facility: CLINIC | Age: 35
End: 2020-09-21

## 2020-09-21 ENCOUNTER — OFFICE VISIT - HEALTHEAST (OUTPATIENT)
Dept: FAMILY MEDICINE | Facility: CLINIC | Age: 35
End: 2020-09-21

## 2020-09-21 DIAGNOSIS — N92.6 MISSED MENSES: ICD-10-CM

## 2020-09-21 ASSESSMENT — PATIENT HEALTH QUESTIONNAIRE - PHQ9: SUM OF ALL RESPONSES TO PHQ QUESTIONS 1-9: 2

## 2020-09-29 ENCOUNTER — HOSPITAL ENCOUNTER (OUTPATIENT)
Dept: ULTRASOUND IMAGING | Facility: HOSPITAL | Age: 35
Discharge: HOME | End: 2020-09-29
Attending: FAMILY MEDICINE

## 2020-09-29 ENCOUNTER — AMBULATORY - HEALTHEAST (OUTPATIENT)
Dept: FAMILY MEDICINE | Facility: CLINIC | Age: 35
End: 2020-09-29

## 2020-09-29 DIAGNOSIS — N92.6 MISSED MENSES: ICD-10-CM

## 2020-10-13 ENCOUNTER — HOSPITAL ENCOUNTER (OUTPATIENT)
Dept: ULTRASOUND IMAGING | Facility: HOSPITAL | Age: 35
Discharge: HOME | End: 2020-10-13
Attending: FAMILY MEDICINE

## 2020-10-13 DIAGNOSIS — N92.6 MISSED MENSES: ICD-10-CM

## 2020-11-12 ENCOUNTER — PRENATAL OFFICE VISIT - HEALTHEAST (OUTPATIENT)
Dept: FAMILY MEDICINE | Facility: CLINIC | Age: 35
End: 2020-11-12

## 2020-11-12 ENCOUNTER — RECORDS - HEALTHEAST (OUTPATIENT)
Dept: ADMINISTRATIVE | Facility: OTHER | Age: 35
End: 2020-11-12

## 2020-11-12 DIAGNOSIS — R53.83 FATIGUE, UNSPECIFIED TYPE: ICD-10-CM

## 2020-11-12 DIAGNOSIS — N92.6 MISSED MENSES: ICD-10-CM

## 2020-11-12 LAB
ALBUMIN UR-MCNC: NEGATIVE MG/DL
APPEARANCE UR: CLEAR
BASOPHILS # BLD AUTO: 0.1 THOU/UL (ref 0–0.2)
BASOPHILS NFR BLD AUTO: 1 % (ref 0–2)
BILIRUB UR QL STRIP: NEGATIVE
CLUE CELLS: NORMAL
COLOR UR AUTO: YELLOW
EOSINOPHIL # BLD AUTO: 0.2 THOU/UL (ref 0–0.4)
EOSINOPHIL NFR BLD AUTO: 2 % (ref 0–6)
ERYTHROCYTE [DISTWIDTH] IN BLOOD BY AUTOMATED COUNT: 11.8 % (ref 11–14.5)
GLUCOSE UR STRIP-MCNC: NEGATIVE MG/DL
HCG UR QL: POSITIVE
HCT VFR BLD AUTO: 39 % (ref 35–47)
HGB BLD-MCNC: 13.2 G/DL (ref 12–16)
HGB UR QL STRIP: NEGATIVE
HIV 1+2 AB+HIV1 P24 AG SERPL QL IA: NEGATIVE
KETONES UR STRIP-MCNC: NEGATIVE MG/DL
LEUKOCYTE ESTERASE UR QL STRIP: NEGATIVE
LYMPHOCYTES # BLD AUTO: 2 THOU/UL (ref 0.8–4.4)
LYMPHOCYTES NFR BLD AUTO: 19 % (ref 20–40)
MCH RBC QN AUTO: 30.9 PG (ref 27–34)
MCHC RBC AUTO-ENTMCNC: 33.9 G/DL (ref 32–36)
MCV RBC AUTO: 91 FL (ref 80–100)
MONOCYTES # BLD AUTO: 0.5 THOU/UL (ref 0–0.9)
MONOCYTES NFR BLD AUTO: 5 % (ref 2–10)
NEUTROPHILS # BLD AUTO: 7.6 THOU/UL (ref 2–7.7)
NEUTROPHILS NFR BLD AUTO: 74 % (ref 50–70)
NITRATE UR QL: NEGATIVE
PH UR STRIP: 7 [PH] (ref 5–8)
PLATELET # BLD AUTO: 224 THOU/UL (ref 140–440)
PMV BLD AUTO: 7.4 FL (ref 7–10)
RBC # BLD AUTO: 4.28 MILL/UL (ref 3.8–5.4)
SP GR UR STRIP: 1.01 (ref 1–1.03)
TRICHOMONAS, WET PREP: NORMAL
TSH SERPL DL<=0.005 MIU/L-ACNC: 0.4 UIU/ML (ref 0.3–5)
UROBILINOGEN UR STRIP-ACNC: NORMAL
WBC: 10.3 THOU/UL (ref 4–11)
YEAST, WET PREP: NORMAL

## 2020-11-13 LAB
25(OH)D3 SERPL-MCNC: 47.1 NG/ML (ref 30–80)
25(OH)D3 SERPL-MCNC: 47.1 NG/ML (ref 30–80)
ABO/RH(D): NORMAL
ABORH REPEAT: NORMAL
ANTIBODY SCREEN: NEGATIVE
BACTERIA SPEC CULT: NO GROWTH
C TRACH DNA SPEC QL PROBE+SIG AMP: NEGATIVE
HBV SURFACE AG SERPL QL IA: NEGATIVE
HPV SOURCE: ABNORMAL
HUMAN PAPILLOMA VIRUS 16 DNA: NEGATIVE
HUMAN PAPILLOMA VIRUS 18 DNA: NEGATIVE
HUMAN PAPILLOMA VIRUS FINAL DIAGNOSIS: ABNORMAL
HUMAN PAPILLOMA VIRUS OTHER HR: POSITIVE
N GONORRHOEA DNA SPEC QL NAA+PROBE: NEGATIVE
RUBV IGG SERPL QL IA: POSITIVE
SPECIMEN DESCRIPTION: ABNORMAL
T PALLIDUM AB SER QL: NEGATIVE

## 2020-11-19 ENCOUNTER — COMMUNICATION - HEALTHEAST (OUTPATIENT)
Dept: FAMILY MEDICINE | Facility: CLINIC | Age: 35
End: 2020-11-19

## 2020-11-20 ENCOUNTER — COMMUNICATION - HEALTHEAST (OUTPATIENT)
Dept: FAMILY MEDICINE | Facility: CLINIC | Age: 35
End: 2020-11-20

## 2020-12-09 ENCOUNTER — AMBULATORY - HEALTHEAST (OUTPATIENT)
Dept: MATERNAL FETAL MEDICINE | Facility: HOSPITAL | Age: 35
End: 2020-12-09

## 2020-12-09 ENCOUNTER — PRENATAL OFFICE VISIT - HEALTHEAST (OUTPATIENT)
Dept: FAMILY MEDICINE | Facility: CLINIC | Age: 35
End: 2020-12-09

## 2020-12-09 DIAGNOSIS — O26.90 PREGNANCY, ANTEPARTUM, COMPLICATIONS: ICD-10-CM

## 2020-12-09 DIAGNOSIS — O09.522 MULTIGRAVIDA OF ADVANCED MATERNAL AGE IN SECOND TRIMESTER: ICD-10-CM

## 2020-12-09 LAB — MAGNESIUM SERPL-MCNC: 1.9 MG/DL (ref 1.8–2.6)

## 2020-12-09 RX ORDER — MAGNESIUM GLYCINATE 100 MG
TABLET ORAL 3 TIMES DAILY
Status: SHIPPED | COMMUNITY
Start: 2020-12-09

## 2020-12-13 LAB
# FETUSES US: NORMAL
AFP MOM SERPL: 1.01
AFP SERPL-MCNC: 27 NG/ML
AGE - REPORTED: 35.1 YR
CURRENT SMOKER: NO
FAMILY MEMBER DISEASES HX: NO
GA METHOD: NORMAL
GA: NORMAL WK
IDDM PATIENT QL: NO
INTEGRATED SCN PATIENT-IMP: NORMAL
SPECIMEN DRAWN SERPL: NORMAL

## 2020-12-18 ENCOUNTER — COMMUNICATION - HEALTHEAST (OUTPATIENT)
Dept: FAMILY MEDICINE | Facility: CLINIC | Age: 35
End: 2020-12-18

## 2020-12-18 ENCOUNTER — AMBULATORY - HEALTHEAST (OUTPATIENT)
Dept: LAB | Facility: CLINIC | Age: 35
End: 2020-12-18

## 2020-12-18 DIAGNOSIS — O46.92 VAGINAL BLEEDING IN PREGNANCY, SECOND TRIMESTER: ICD-10-CM

## 2020-12-18 LAB
ALBUMIN UR-MCNC: NEGATIVE MG/DL
APPEARANCE UR: CLEAR
BACTERIA #/AREA URNS HPF: ABNORMAL HPF
BILIRUB UR QL STRIP: NEGATIVE
COLOR UR AUTO: YELLOW
GLUCOSE UR STRIP-MCNC: NEGATIVE MG/DL
HGB UR QL STRIP: NEGATIVE
KETONES UR STRIP-MCNC: NEGATIVE MG/DL
LEUKOCYTE ESTERASE UR QL STRIP: ABNORMAL
NITRATE UR QL: NEGATIVE
PH UR STRIP: 7 [PH] (ref 5–8)
RBC #/AREA URNS AUTO: ABNORMAL HPF
SP GR UR STRIP: 1.02 (ref 1–1.03)
SQUAMOUS #/AREA URNS AUTO: ABNORMAL LPF
UROBILINOGEN UR STRIP-ACNC: ABNORMAL
WBC #/AREA URNS AUTO: ABNORMAL HPF

## 2020-12-20 LAB — BACTERIA SPEC CULT: NORMAL

## 2020-12-28 ENCOUNTER — AMBULATORY - HEALTHEAST (OUTPATIENT)
Dept: MATERNAL FETAL MEDICINE | Facility: HOSPITAL | Age: 35
End: 2020-12-28

## 2020-12-30 ENCOUNTER — RECORDS - HEALTHEAST (OUTPATIENT)
Dept: ULTRASOUND IMAGING | Facility: HOSPITAL | Age: 35
End: 2020-12-30

## 2020-12-30 ENCOUNTER — OFFICE VISIT - HEALTHEAST (OUTPATIENT)
Dept: MATERNAL FETAL MEDICINE | Facility: HOSPITAL | Age: 35
End: 2020-12-30

## 2020-12-30 ENCOUNTER — RECORDS - HEALTHEAST (OUTPATIENT)
Dept: ADMINISTRATIVE | Facility: OTHER | Age: 35
End: 2020-12-30

## 2020-12-30 DIAGNOSIS — O09.522 ADVANCED MATERNAL AGE IN MULTIGRAVIDA, SECOND TRIMESTER: ICD-10-CM

## 2020-12-30 DIAGNOSIS — O26.90 PREGNANCY RELATED CONDITIONS, UNSPECIFIED, UNSPECIFIED TRIMESTER: ICD-10-CM

## 2020-12-30 DIAGNOSIS — O26.90 PREGNANCY, ANTEPARTUM, COMPLICATIONS: ICD-10-CM

## 2021-01-07 ENCOUNTER — PRENATAL OFFICE VISIT - HEALTHEAST (OUTPATIENT)
Dept: FAMILY MEDICINE | Facility: CLINIC | Age: 36
End: 2021-01-07

## 2021-01-07 DIAGNOSIS — N94.9 ROUND LIGAMENT PAIN: ICD-10-CM

## 2021-02-04 ENCOUNTER — PRENATAL OFFICE VISIT - HEALTHEAST (OUTPATIENT)
Dept: FAMILY MEDICINE | Facility: CLINIC | Age: 36
End: 2021-02-04

## 2021-02-04 DIAGNOSIS — O09.522 MULTIGRAVIDA OF ADVANCED MATERNAL AGE IN SECOND TRIMESTER: ICD-10-CM

## 2021-02-04 LAB
ALBUMIN UR-MCNC: NEGATIVE MG/DL
APPEARANCE UR: CLEAR
BILIRUB UR QL STRIP: NEGATIVE
COLOR UR AUTO: YELLOW
FASTING STATUS PATIENT QL REPORTED: NO
GLUCOSE 1H P 50 G GLC PO SERPL-MCNC: 124 MG/DL (ref 70–139)
GLUCOSE UR STRIP-MCNC: NEGATIVE MG/DL
HGB BLD-MCNC: 11.7 G/DL (ref 12–16)
HGB UR QL STRIP: NEGATIVE
KETONES UR STRIP-MCNC: NEGATIVE MG/DL
LEUKOCYTE ESTERASE UR QL STRIP: NEGATIVE
NITRATE UR QL: NEGATIVE
PH UR STRIP: 7 [PH] (ref 5–8)
SP GR UR STRIP: 1.01 (ref 1–1.03)
UROBILINOGEN UR STRIP-ACNC: NORMAL

## 2021-02-05 LAB
BACTERIA SPEC CULT: NO GROWTH
T PALLIDUM AB SER QL: NEGATIVE

## 2021-03-04 ENCOUNTER — PRENATAL OFFICE VISIT - HEALTHEAST (OUTPATIENT)
Dept: FAMILY MEDICINE | Facility: CLINIC | Age: 36
End: 2021-03-04

## 2021-03-04 DIAGNOSIS — Z91.89 AT RISK FOR BREASTFEEDING DIFFICULTY: ICD-10-CM

## 2021-03-18 ENCOUNTER — PRENATAL OFFICE VISIT - HEALTHEAST (OUTPATIENT)
Dept: FAMILY MEDICINE | Facility: CLINIC | Age: 36
End: 2021-03-18

## 2021-03-18 DIAGNOSIS — O09.522 MULTIGRAVIDA OF ADVANCED MATERNAL AGE IN SECOND TRIMESTER: ICD-10-CM

## 2021-03-18 LAB
ALBUMIN UR-MCNC: NEGATIVE G/DL
APPEARANCE UR: CLEAR
BILIRUB UR QL STRIP: NEGATIVE
COLOR UR AUTO: YELLOW
GLUCOSE UR STRIP-MCNC: NEGATIVE MG/DL
HGB BLD-MCNC: 12 G/DL (ref 12–16)
HGB UR QL STRIP: NEGATIVE
KETONES UR STRIP-MCNC: NEGATIVE MG/DL
LEUKOCYTE ESTERASE UR QL STRIP: NEGATIVE
NITRATE UR QL: NEGATIVE
PH UR STRIP: 7 [PH] (ref 5–8)
SP GR UR STRIP: 1.01 (ref 1–1.03)
UROBILINOGEN UR STRIP-ACNC: NORMAL

## 2021-03-23 ENCOUNTER — RECORDS - HEALTHEAST (OUTPATIENT)
Dept: ADMINISTRATIVE | Facility: OTHER | Age: 36
End: 2021-03-23

## 2021-03-23 ENCOUNTER — COMMUNICATION - HEALTHEAST (OUTPATIENT)
Dept: MIDWIFE SERVICES | Facility: CLINIC | Age: 36
End: 2021-03-23

## 2021-03-26 ENCOUNTER — AMBULATORY - HEALTHEAST (OUTPATIENT)
Dept: NURSING | Facility: CLINIC | Age: 36
End: 2021-03-26

## 2021-04-01 ENCOUNTER — PRENATAL OFFICE VISIT - HEALTHEAST (OUTPATIENT)
Dept: FAMILY MEDICINE | Facility: CLINIC | Age: 36
End: 2021-04-01

## 2021-04-01 DIAGNOSIS — O09.522 MULTIGRAVIDA OF ADVANCED MATERNAL AGE IN SECOND TRIMESTER: ICD-10-CM

## 2021-04-01 LAB
ALBUMIN UR-MCNC: NEGATIVE G/DL
APPEARANCE UR: CLEAR
BILIRUB UR QL STRIP: NEGATIVE
COLOR UR AUTO: YELLOW
GLUCOSE UR STRIP-MCNC: NEGATIVE MG/DL
HGB UR QL STRIP: NEGATIVE
KETONES UR STRIP-MCNC: NEGATIVE MG/DL
LEUKOCYTE ESTERASE UR QL STRIP: NEGATIVE
NITRATE UR QL: NEGATIVE
PH UR STRIP: 7 [PH] (ref 5–8)
SP GR UR STRIP: 1.01 (ref 1–1.03)
UROBILINOGEN UR STRIP-ACNC: NORMAL

## 2021-04-02 ENCOUNTER — COMMUNICATION - HEALTHEAST (OUTPATIENT)
Dept: ADMINISTRATIVE | Facility: CLINIC | Age: 36
End: 2021-04-02

## 2021-04-15 ENCOUNTER — PRENATAL OFFICE VISIT - HEALTHEAST (OUTPATIENT)
Dept: FAMILY MEDICINE | Facility: CLINIC | Age: 36
End: 2021-04-15

## 2021-04-15 DIAGNOSIS — Z34.83 ENCOUNTER FOR SUPERVISION OF OTHER NORMAL PREGNANCY IN THIRD TRIMESTER: ICD-10-CM

## 2021-04-15 DIAGNOSIS — O09.522 MULTIGRAVIDA OF ADVANCED MATERNAL AGE IN SECOND TRIMESTER: ICD-10-CM

## 2021-04-15 DIAGNOSIS — O09.523 MULTIGRAVIDA OF ADVANCED MATERNAL AGE IN THIRD TRIMESTER: ICD-10-CM

## 2021-04-15 LAB
ALBUMIN UR-MCNC: NEGATIVE G/DL
APPEARANCE UR: CLEAR
BILIRUB UR QL STRIP: NEGATIVE
COLOR UR AUTO: YELLOW
GLUCOSE UR STRIP-MCNC: ABNORMAL MG/DL
HGB UR QL STRIP: NEGATIVE
KETONES UR STRIP-MCNC: ABNORMAL MG/DL
LEUKOCYTE ESTERASE UR QL STRIP: NEGATIVE
NITRATE UR QL: NEGATIVE
PH UR STRIP: 7 [PH] (ref 5–8)
SP GR UR STRIP: 1.02 (ref 1–1.03)
UROBILINOGEN UR STRIP-ACNC: ABNORMAL

## 2021-04-16 ENCOUNTER — AMBULATORY - HEALTHEAST (OUTPATIENT)
Dept: NURSING | Facility: CLINIC | Age: 36
End: 2021-04-16

## 2021-04-19 ENCOUNTER — COMMUNICATION - HEALTHEAST (OUTPATIENT)
Dept: FAMILY MEDICINE | Facility: CLINIC | Age: 36
End: 2021-04-19

## 2021-04-20 ENCOUNTER — COMMUNICATION - HEALTHEAST (OUTPATIENT)
Dept: FAMILY MEDICINE | Facility: CLINIC | Age: 36
End: 2021-04-20

## 2021-04-20 DIAGNOSIS — Z34.83 ENCOUNTER FOR SUPERVISION OF OTHER NORMAL PREGNANCY IN THIRD TRIMESTER: ICD-10-CM

## 2021-04-20 DIAGNOSIS — O09.522 MULTIGRAVIDA OF ADVANCED MATERNAL AGE IN SECOND TRIMESTER: ICD-10-CM

## 2021-04-27 ENCOUNTER — HOSPITAL ENCOUNTER (OUTPATIENT)
Dept: ULTRASOUND IMAGING | Facility: HOSPITAL | Age: 36
Discharge: HOME | End: 2021-04-27
Attending: FAMILY MEDICINE

## 2021-04-27 DIAGNOSIS — O09.522 MULTIGRAVIDA OF ADVANCED MATERNAL AGE IN SECOND TRIMESTER: ICD-10-CM

## 2021-04-27 DIAGNOSIS — Z34.83 ENCOUNTER FOR SUPERVISION OF OTHER NORMAL PREGNANCY IN THIRD TRIMESTER: ICD-10-CM

## 2021-04-28 ENCOUNTER — COMMUNICATION - HEALTHEAST (OUTPATIENT)
Dept: FAMILY MEDICINE | Facility: CLINIC | Age: 36
End: 2021-04-28

## 2021-04-29 ENCOUNTER — PRENATAL OFFICE VISIT - HEALTHEAST (OUTPATIENT)
Dept: FAMILY MEDICINE | Facility: CLINIC | Age: 36
End: 2021-04-29
Payer: COMMERCIAL

## 2021-04-29 DIAGNOSIS — O09.523 MULTIGRAVIDA OF ADVANCED MATERNAL AGE IN THIRD TRIMESTER: ICD-10-CM

## 2021-04-29 DIAGNOSIS — O09.522 MULTIGRAVIDA OF ADVANCED MATERNAL AGE IN SECOND TRIMESTER: ICD-10-CM

## 2021-04-29 LAB
ALBUMIN UR-MCNC: NEGATIVE G/DL
APPEARANCE UR: CLEAR
BILIRUB UR QL STRIP: NEGATIVE
COLOR UR AUTO: YELLOW
GLUCOSE UR STRIP-MCNC: NEGATIVE MG/DL
HGB BLD-MCNC: 12.6 G/DL (ref 12–16)
HGB UR QL STRIP: ABNORMAL
KETONES UR STRIP-MCNC: NEGATIVE MG/DL
LEUKOCYTE ESTERASE UR QL STRIP: NEGATIVE
NITRATE UR QL: NEGATIVE
PH UR STRIP: 6.5 [PH] (ref 5–8)
SP GR UR STRIP: 1.01 (ref 1–1.03)
UROBILINOGEN UR STRIP-ACNC: ABNORMAL

## 2021-04-30 LAB
ALLERGIC TO PENICILLIN: NO
GP B STREP DNA SPEC QL NAA+PROBE: NEGATIVE

## 2021-05-06 ENCOUNTER — PRENATAL OFFICE VISIT - HEALTHEAST (OUTPATIENT)
Dept: FAMILY MEDICINE | Facility: CLINIC | Age: 36
End: 2021-05-06

## 2021-05-06 DIAGNOSIS — O09.522 MULTIGRAVIDA OF ADVANCED MATERNAL AGE IN SECOND TRIMESTER: ICD-10-CM

## 2021-05-06 LAB
ALBUMIN UR-MCNC: NEGATIVE G/DL
APPEARANCE UR: CLEAR
BILIRUB UR QL STRIP: NEGATIVE
COLOR UR AUTO: YELLOW
GLUCOSE UR STRIP-MCNC: NEGATIVE MG/DL
HGB UR QL STRIP: ABNORMAL
KETONES UR STRIP-MCNC: NEGATIVE MG/DL
LEUKOCYTE ESTERASE UR QL STRIP: NEGATIVE
NITRATE UR QL: NEGATIVE
PH UR STRIP: 7 [PH] (ref 5–8)
SP GR UR STRIP: 1.01 (ref 1–1.03)
UROBILINOGEN UR STRIP-ACNC: ABNORMAL

## 2021-05-13 ENCOUNTER — PRENATAL OFFICE VISIT - HEALTHEAST (OUTPATIENT)
Dept: FAMILY MEDICINE | Facility: CLINIC | Age: 36
End: 2021-05-13

## 2021-05-13 ENCOUNTER — HOSPITAL ENCOUNTER (OUTPATIENT)
Dept: ULTRASOUND IMAGING | Facility: HOSPITAL | Age: 36
Discharge: HOME | End: 2021-05-13
Attending: FAMILY MEDICINE

## 2021-05-13 ENCOUNTER — HOSPITAL ENCOUNTER (OUTPATIENT)
Dept: OBGYN | Facility: HOSPITAL | Age: 36
Discharge: HOME | End: 2021-05-13
Attending: FAMILY MEDICINE | Admitting: FAMILY MEDICINE

## 2021-05-13 DIAGNOSIS — O32.0XX0 UNSTABLE FETAL LIE, SINGLE OR UNSPECIFIED FETUS: ICD-10-CM

## 2021-05-13 DIAGNOSIS — O09.522 MULTIGRAVIDA OF ADVANCED MATERNAL AGE IN SECOND TRIMESTER: ICD-10-CM

## 2021-05-13 LAB
ALBUMIN UR-MCNC: NEGATIVE G/DL
APPEARANCE UR: CLEAR
BILIRUB UR QL STRIP: NEGATIVE
COLOR UR AUTO: YELLOW
GLUCOSE UR STRIP-MCNC: NEGATIVE MG/DL
HGB UR QL STRIP: NEGATIVE
KETONES UR STRIP-MCNC: NEGATIVE MG/DL
LEUKOCYTE ESTERASE UR QL STRIP: NEGATIVE
NITRATE UR QL: NEGATIVE
PH UR STRIP: 7 [PH] (ref 5–8)
SP GR UR STRIP: 1.02 (ref 1–1.03)
UROBILINOGEN UR STRIP-ACNC: NORMAL

## 2021-05-14 ENCOUNTER — COMMUNICATION - HEALTHEAST (OUTPATIENT)
Dept: SCHEDULING | Facility: CLINIC | Age: 36
End: 2021-05-14

## 2021-05-14 LAB
SARS-COV-2 PCR COMMENT: NORMAL
SARS-COV-2 RNA SPEC QL NAA+PROBE: NEGATIVE
SARS-COV-2 VIRUS SPECIMEN SOURCE: NORMAL

## 2021-05-15 ENCOUNTER — AMBULATORY - HEALTHEAST (OUTPATIENT)
Dept: FAMILY MEDICINE | Facility: CLINIC | Age: 36
End: 2021-05-15

## 2021-05-15 DIAGNOSIS — O32.0XX0 UNSTABLE FETAL LIE, SINGLE OR UNSPECIFIED FETUS: ICD-10-CM

## 2021-05-17 ENCOUNTER — COMMUNICATION - HEALTHEAST (OUTPATIENT)
Dept: FAMILY MEDICINE | Facility: CLINIC | Age: 36
End: 2021-05-17
Payer: COMMERCIAL

## 2021-05-18 ENCOUNTER — HOSPITAL ENCOUNTER (OUTPATIENT)
Dept: OBGYN | Facility: HOSPITAL | Age: 36
Discharge: HOME | End: 2021-05-18
Attending: OBSTETRICS & GYNECOLOGY | Admitting: OBSTETRICS & GYNECOLOGY

## 2021-05-20 ENCOUNTER — PRENATAL OFFICE VISIT - HEALTHEAST (OUTPATIENT)
Dept: FAMILY MEDICINE | Facility: CLINIC | Age: 36
End: 2021-05-20

## 2021-05-20 ENCOUNTER — RECORDS - HEALTHEAST (OUTPATIENT)
Dept: ADMINISTRATIVE | Facility: OTHER | Age: 36
End: 2021-05-20

## 2021-05-20 DIAGNOSIS — O32.0XX0 UNSTABLE FETAL LIE, SINGLE OR UNSPECIFIED FETUS: ICD-10-CM

## 2021-05-20 DIAGNOSIS — O09.522 MULTIGRAVIDA OF ADVANCED MATERNAL AGE IN SECOND TRIMESTER: ICD-10-CM

## 2021-05-20 LAB
ALBUMIN UR-MCNC: NEGATIVE G/DL
APPEARANCE UR: CLEAR
BILIRUB UR QL STRIP: NEGATIVE
COLOR UR AUTO: NORMAL
GLUCOSE UR STRIP-MCNC: NEGATIVE MG/DL
HGB UR QL STRIP: NEGATIVE
KETONES UR STRIP-MCNC: NEGATIVE MG/DL
LEUKOCYTE ESTERASE UR QL STRIP: NEGATIVE
NITRATE UR QL: NEGATIVE
PH UR STRIP: 7 [PH] (ref 5–8)
SP GR UR STRIP: 1.01 (ref 1–1.03)
UROBILINOGEN UR STRIP-ACNC: NORMAL

## 2021-05-21 ENCOUNTER — ANESTHESIA - HEALTHEAST (OUTPATIENT)
Dept: OBGYN | Facility: HOSPITAL | Age: 36
End: 2021-05-21

## 2021-05-25 ENCOUNTER — RECORDS - HEALTHEAST (OUTPATIENT)
Dept: ADMINISTRATIVE | Facility: OTHER | Age: 36
End: 2021-05-25

## 2021-05-27 ENCOUNTER — AMBULATORY - HEALTHEAST (OUTPATIENT)
Dept: FAMILY MEDICINE | Facility: CLINIC | Age: 36
End: 2021-05-27

## 2021-05-27 ENCOUNTER — AMBULATORY - HEALTHEAST (OUTPATIENT)
Dept: LAB | Facility: CLINIC | Age: 36
End: 2021-05-27

## 2021-05-27 VITALS — BODY MASS INDEX: 40.52 KG/M2 | BODY MASS INDEX: 40.44 KG/M2 | WEIGHT: 243 LBS | WEIGHT: 243.5 LBS

## 2021-05-27 VITALS
TEMPERATURE: 96.5 F | SYSTOLIC BLOOD PRESSURE: 129 MMHG | DIASTOLIC BLOOD PRESSURE: 78 MMHG | SYSTOLIC BLOOD PRESSURE: 139 MMHG | DIASTOLIC BLOOD PRESSURE: 84 MMHG | RESPIRATION RATE: 16 BRPM | HEART RATE: 97 BPM

## 2021-05-27 LAB — HGB BLD-MCNC: 9 G/DL (ref 12–16)

## 2021-05-27 NOTE — PATIENT INSTRUCTIONS - HE
Steam, nasal saline, humidifier  Prednisone 40mg daily for 3 days  Cefdinir twice a day for 10 days  Resume flonase or other allergy nose spray regularly  Continue allegra  Recheck if worse or no better

## 2021-05-27 NOTE — PROGRESS NOTES
Assessment/Plan:   Subacute maxillary sinusitis  Acute otitis media with effusion of right ear  Subacute sinusitis following prolonged URI, complicated by airline travel. Treated with a course of augmentin in March with minimal improvement, now symptoms worse and associated with a right OM.also perhaps complicated by seasonal allergies. Will try cefdinir and prednisone - start flonase or your allergy spray.   I discussed red flag symptoms, return precautions to clinic/ER and follow up care with patient/parent.  Expected clinical course, symptomatic cares advised. Questions answered. Patient/parent amenable with plan.  - predniSONE (DELTASONE) 20 MG tablet; 40mg daily for 3 days.  Dispense: 6 tablet; Refill: 0  - cefdinir (OMNICEF) 300 MG capsule; Take 1 capsule (300 mg total) by mouth 2 (two) times a day for 10 days. Take with food. Take probiotic while on antibiotic.  Dispense: 20 capsule; Refill: 0    Steam, nasal saline, humidifier  Prednisone 40mg daily for 3 days  Cefdinir twice a day for 10 days  Resume flonase or other allergy nose spray regularly  Continue allegra  Recheck if worse or no better    Subjective:      Melisa Daily is a 33 y.o. female who presents with congestion and ear pain. She came down with the 'crud' in early March - her whole family was sick at that time. She was given Augmentin in March for ongoing congestion and sinus pain but there was not much change. They then went to [facility] for vacation and her ears were fine on the flight there and back. Since then she has remained congested with sinus pain and pressure that was fluctuating but getting more persistent this week. She is also having ear pain for the last 4 days, bilateral. She has pain radiating to jaw and teeth, nasal drainage is now darker yellow and thick. No fever. No vertigo or loss of hearing. No N/v/D. No rash. No ear drainage. They feel plugged. She does have allergies in the fall, not usually spring. Not currently pregnant.  Non smoker. Daughter has also had ongoing symptoms and been treated for ear infection and finally improving.     Allergies   Allergen Reactions     Ragweed Pollen Other (See Comments)     Runny nose, itchy eyes, sinus congestion, seasonal symptoms.     Hydrocodone-Acetaminophen Nausea And Vomiting     Current Outpatient Medications on File Prior to Visit   Medication Sig Dispense Refill     fexofenadine (ALLEGRA) 180 MG tablet Take 180 mg by mouth daily.       OMEGA-3/DHA/EPA/FISH OIL (FISH OIL-OMEGA-3 FATTY ACIDS) 300-1,000 mg capsule Take 1 g by mouth daily.       prenatal vitamin iron-folic acid 27mg-0.8mg (PRENATAL S) 27 mg iron- 800 mcg Tab tablet Take 1 tablet by mouth daily.       CHOLECALCIFEROL, VITAMIN D3, (VITAMIN D3 ORAL) Take 5,000 Units by mouth.        etonogestrel-ethinyl estradiol (NUVARING) 0.12-0.015 mg/24 hr vaginal ring Insert vaginally and leave in place for 3 consecutive weeks, then remove for 1 week. 3 each 3     nipple ointment all purpose - [facility] Pharm[facility] Apply topically as needed for irritation. Apply to nipples sparingly after each feeding. Do not wash or wipe off. 30.6 g 0     No current facility-administered medications on file prior to visit.      Patient Active Problem List   Diagnosis     Hyperlipidemia     Allergies     Family history of hypertrophic cardiomyopathy     Pregnancy, incidental     Unstable fetal lie     Unstable lie of fetus     Pregnant     Normal vaginal delivery     Postpartum anxiety       Objective:     /79   Pulse 70   Temp 98  F (36.7  C) (Oral)   Resp 18   Wt 198 lb (89.8 kg)   SpO2 98%   BMI 32.95 kg/m      Physical  General Appearance: Alert, cooperative, no distress, mildly ill appearing.   Head: Normocephalic, without obvious abnormality, atraumatic  Eyes: Conjunctivae are normal.   Ears: No pain with retraction of the pinnae. Left TM and canal are normal, the right TM is red and dull with loss of landmarks and light reflex and thicker  yellow coloring suggesting a purulent effusion inferiorly.   Nose: congestion, red swollen turbinates with green crusting, bilateral maxillary discomfort with percussion.  Throat: Throat is normal.  No exudate.  No significant lesions  Neck: No adenopathy  Lungs: Clear to auscultation bilaterally, respirations unlabored  Heart: Regular rate and rhythm  Skin: Skin color, texture, turgor normal, no rashes or lesions  Psychiatric: Patient has a normal mood and affect.

## 2021-05-28 NOTE — PROGRESS NOTES
[facility]      CHIEF COMPLAINT/REASON FOR VISIT:  Chief Complaint   Patient presents with     Cough     chest congestion, worse at night, throat feels raw 1.5 weeks       HISTORY:      HPI: Melisa is a 34 y.o. female who  has a past medical history of Dysplastic nevi (3/4/2015), Pregnancy (2/17/2016), and Vaginal delivery. She also has no past medical history of Family history of malignant hyperthermia or PONV (postoperative nausea and vomiting). Melisa is requesting evaluation of coughing and she believes she has some issues with allergies. She has been taking Robitussin day and night without relief. She shares that she is coughing, coughing, coughing and cannot sleep at night. She has been taking allegra but has an allergy appointment this week and was taken off of it because they request that patients are off of the antihistamine medications. She denies any other concerns including fevers/chills, body aches, headaches, vision changes, chest pain/pressure, difficulty breathing, SOB, abdominal pain, nausea, vomiting, diarrhea, dysuria, increasing weakness, increasing pain. She is most concerned about her raw throat from coughing.       Past Medical History:   Diagnosis Date     Dysplastic nevi 3/4/2015     Pregnancy 2/17/2016     Vaginal delivery              Family History   Problem Relation Age of Onset     Hypertension Mother      Stroke Mother      Asthma Mother      Depression Mother      Coronary artery disease Mother      Depression Father      Coronary artery disease Father      Hypertension Father      Diabetes type II Maternal Grandmother      Stroke Paternal Grandmother      Social History     Socioeconomic History     Marital status:      Spouse name: None     Number of children: None     Years of education: None     Highest education level: None   Occupational History     Occupation:    Social Needs     Financial resource strain: None     Food insecurity:      Worry: None     Inability: None     Transportation needs:     Medical: None     Non-medical: None   Tobacco Use     Smoking status: Never Smoker     Smokeless tobacco: Never Used   Substance and Sexual Activity     Alcohol use: Yes     Alcohol/week: 1.5 oz     Types: 3 Standard drinks or equivalent per week     Comment: prior to pregnancy     Drug use: No     Sexual activity: Yes     Partners: Male   Lifestyle     Physical activity:     Days per week: None     Minutes per session: None     Stress: None   Relationships     Social connections:     Talks on phone: None     Gets together: None     Attends Yazdanism service: None     Active member of club or organization: None     Attends meetings of clubs or organizations: None     Relationship status: None     Intimate partner violence:     Fear of current or ex partner: None     Emotionally abused: None     Physically abused: None     Forced sexual activity: None   Other Topics Concern     None   Social History Narrative     None       REVIEW OF SYSTEM:  Per HPI    PHYSICAL EXAM:   /89   Pulse 92   Temp 98.2  F (36.8  C) (Oral)   Resp 18   Wt 198 lb 14.4 oz (90.2 kg)   SpO2 97%   BMI 33.10 kg/m    General appearance: alert, appears stated age and cooperative  HEENT: Head is normocephalic with normal hair distribution. No evidence of trauma. Ears: Without lesions or deformity. No acute purulent discharge. Eyes: Conjunctivae pink with no scleral icterus or erythema. Nose: Normal mucosa and septum. Oropharnyx: mmm, no lesions present.  Lungs: clear to auscultation bilaterally, respirations without effort  Heart: regular rate and rhythm, S1, S2 normal, no murmur, click, rub or gallop  Abdomen: soft, non-tender; bowel sounds normal; no masses,  no organomegaly  Extremities: extremities normal, atraumatic, no cyanosis or edema  Pulses: 2+ and symmetric  Skin: Skin color, texture, turgor normal. No rashes or lesions  Neurologic: Grossly normal   Psych: interacts  well with caregivers, exhibits logical thought processes and connections, pleasant    LABS:   None.     ASSESSMENT:      ICD-10-CM    1. URI with cough and congestion J06.9 codeine-guaiFENesin (GUAIFENESIN AC)  mg/5 mL liquid       PLAN:    Upper Respiratory Infection-Likely Viral, Likely related to Allergies  Seasonal Allergies  -Encouraged strict handwashing, covering cough, and keeping room neat and clean.   -Encouraged steam baths if patient can tolerate to help loosen phlegm.  -Encouraged use of hot tea with honey to help loosen phlegm and clear cough.  -OTC Tylenol or Motrin for comfort.   -Encourage fluids.   -Robitussin with Codeine 10 ml by mouth at bedtime.   -Follow with allergist this week.      All questions answered to patient's satisfaction. No further questions posed, no comments or issues to report. Patient advised to return to primary care provider, urgent care or ER with any further complaints, issues or concerns.    Electronically signed by: Ariana Cintron CNP

## 2021-05-28 NOTE — TELEPHONE ENCOUNTER
budesonide (RINOCORT AQUA) 32 mcg/actuation nasal spray [[id number]]     Electronically signed by: Nadeen Mendoza RN on 08/16/17 1909 Status: Discontinued   Ordering user: Nadeen Mendoza RN 08/16/17 1909 Ordering provider: Harleen Rivera MD   Authorized by: Harleen Rivera MD   Frequency:  08/16/17 - 08/23/18  Discontinued by: Harleen Rivera MD 08/23/18 1423 [Therapy completed]     Nadeen Mendoza RN [facility] Triage/Medication Refill

## 2021-05-29 NOTE — PROGRESS NOTES
Assessment:    Allergic rhinitis with sensitivity to dust mite, grass pollen, mold, weed pollen and cat    Plan:      Environmental avoidance measures  Antihistamines can be discontinued as needed.  Consider switching from Allegra to other types such as Claritin or Zyrtec.  Recommend adding fluticasone 1 spray each nostril morning and night.  Consider adding Singulair 10 mg daily    Consider allergy shots.  Information given.  ____________________________________________________________________________     Melisa comes in today for allergy evaluation.  She reports having allergy since she was a child.  She feels that they are getting worse.  She describes itchy watery eyes.  At one point she had crusting over her eyes in the morning.  They are itchy.  She has rhinorrhea and nasal congestion.  She describes sneezing.  She has a cough that she describes as a drainage cough.  Usually it a wet sounding cough.  She is worse always in the spring although does have symptoms year-round.  Also she feels that she is worse outdoors.  Also cats that seem to trigger symptoms.  She is used Allegra year-round with some benefit.  10 years ago she had allergy testing.  She recalls being allergic to pollen, dust mite, cat and dog.    Review of symptoms:  As above, otherwise negative    Past medical history: No other chronic medical conditions noted.    Allergies: No known allergies to medications, latex, foods or hymenoptera venom    Family history: Mother with asthma.  Mother and sister with allergies.    Social history: Currently is lived in the same house for 2 years.  No visible water seepage or mold in the home.  There are 2 dogs in the home.  She reports having had dogs for 6 years.  No cigarette smoking history.    Medications: reviewed in chart    Physical Exam:  General:  Alert and in no apparent distress.  Eyes:  Sclera clear.  Ears: TMs translucent grey with bony landmarks visible. Nose: Pale, boggy mucosal membranes.   Throat: Pink, moist.  No lesions.  Neck: Supple.  No lymphadenopathy.  Lungs: CTA.  CV: Regular rate and rhythm. Extremities: Well perfused.  No clubbing or cyanosis. Skin: No rash    Last Percutaneous Allergy Test Results  Trees  Rhys, White  1:20 H  (W/F in mm): 0/0 (05/23/19 0933)  Birch Mix 1:20 H (W/F in mm): 0/0 (05/23/19 0933)  [place], Common 1:20 H (W/F in mm): 0/0 (05/23/19 0933)  Elm, American 1:20 H (W/F in mm): 0/0 (05/23/19 0933)  [place]olt, Shagbark 1:20 H (W/F in mm): 0/0 (05/23/19 0933)  Maple, Hard/Sugar 1:20 H (W/F in mm): 0/0 (05/23/19 0933)  [place] Mix 1:20 H (W/F in mm): 0/0 (05/23/19 0933)  Evergreen, Red 1:20 H (W/F in mm): 0/0 (05/23/19 0933)  [place], American 1:20 H (W/F in mm): 0/0 (05/23/19 0933)  [place] Tree 1:20 H (W/F in mm): 0/0 (05/23/19 0933)  Dust Mites  D. Pteronyssinus Mite 30,000 AU/ML H (W/F in mm): 8/31 (05/23/19 0933)  D. Farinae Mite 30,000 AU/ML H (W/F in mm: 4/15 (05/23/19 0933)  Grasses  Grass Mix #4 10,000 BAU/ML H: 7/26 (05/23/19 0933)  Joseph Grass 1:20 H (W/F in mm): 0/0 (05/23/19 0933)  Cockroach  Cockroach Mix 1:10 H (W/F in mm): 0/0 (05/23/19 0933)  Molds/Fungi  Alternaria Tenuis 1:10 H (W/F in mm): 8/32 (05/23/19 0933)  Aspergillus Fumigatus 1:10 H (W/F in mm): 0/0 (05/23/19 0933)  Homodendrum Cladosporioides 1:10 H (W/F in mm): 4/25 (05/23/19 0933)  Penicillin Notatum 1:10 H (W/F in mm): 0/0 (05/23/19 0933)  Epicoccum 1:10 H (W/F in mm): 0/0 (05/23/19 0933)  Weeds  Ragweed, Short 1:20 H (W/F in mm): 12/39 (05/23/19 0933)  Dock, [place] 1:20 H (W/F in mm): 4/23 (05/23/19 0933)  Lamb's Quarter 1:20 H (W/F in mm): 0/0 (05/23/19 0933)  Pigweed, Rough Red Root 1:20 H  (W/F in mm): 0/0 (05/23/19 0933)  Plantain, English 1:20 H  (W/F in mm): 0/0 (05/23/19 0933)  Sagebrush, Mugwort 1:20 H  (W/F in mm): 4/30 (05/23/19 0933)  Animal  Cat 10,000 BAU/ML H (W/F in mm): 10/23 (05/23/19 0933)  Dog 1:10 H (W/F in mm): 0/0 (05/23/19 0933)  Controls  Device Type: QUINTIP (05/23/19  0933)  Neg. control: 50% Glycerine/Saline H (W/F in mm): 0/0 (05/23/19 0933)  Pos. control: Histamine 6mg/ML (W/F in mms): 7/21 (05/23/19 0933)     Patient seen upon request of Dr. Harleen Rivera for evaluation of environmental allergies.

## 2021-05-29 NOTE — PATIENT INSTRUCTIONS - HE
Assessment:    Allergic rhinitis with sensitivity to dust mite, grass pollen, mold, weed pollen and cat    Plan:      Environmental avoidance measures  Antihistamines can be discontinued as needed.  Consider switching from Allegra to other types such as Claritin or Zyrtec.  Recommend adding fluticasone 1 spray each nostril morning and night.  Consider adding Singulair 10 mg daily    Consider allergy shots.

## 2021-05-29 NOTE — TELEPHONE ENCOUNTER
Dr. Pinto    This patient called wanting to get a refill of this medication:    Medication: Singular  Pharmacy: [facility] in [place]  Person Who Called: Melisa Antoine Phone Number To Call The Patient Back: [phone]  Okay To Leave A Detailed Message? Yes    She states that the Rx was not sent over yesterday at her appt.    I did confirm the pharmacy on file. Thank you.

## 2021-05-30 VITALS — BODY MASS INDEX: 32.78 KG/M2 | WEIGHT: 197 LBS

## 2021-05-31 ENCOUNTER — COMMUNICATION - HEALTHEAST (OUTPATIENT)
Dept: FAMILY MEDICINE | Facility: CLINIC | Age: 36
End: 2021-05-31
Payer: COMMERCIAL

## 2021-05-31 VITALS — WEIGHT: 199.38 LBS | BODY MASS INDEX: 33.18 KG/M2

## 2021-05-31 VITALS — BODY MASS INDEX: 33.8 KG/M2 | WEIGHT: 203.13 LBS

## 2021-05-31 VITALS — BODY MASS INDEX: 34.16 KG/M2 | WEIGHT: 205.25 LBS

## 2021-05-31 NOTE — PROGRESS NOTES
Assessment/Plan:  1. Routine general medical examination at a health care facility  Gynecologic Cytology (PAP Smear)    Hepatitis B Surface Antibody (Anti-HBs) Vaccine Check    HPV High Risk DNA Cervical   2. Change in mole  Ambulatory referral to Dermatology   3. Hyperlipidemia, unspecified hyperlipidemia type  Lipid Cascade FASTING   4. Unusually frequent menses  US Pelvis With Transvaginal Non OB    Comprehensive Metabolic Panel    HM2(CBC w/o Differential)    Thyroid Cascade    Prolactin       Patient is a 34 y.o. female here for physical exam. See health maintenance section below. Labs as ordered.      Please send us a [employer] message with the dates of your hepatitis B shots.   Please check if there is any family history of colon cancer or colon polyps.   Please set up a dermtatology appointment for a full-body skin check and check the changing moles and have them send us the notes.   We are ordering a pelvic ultrasound for frequent menses.  Can call [phone] to set it up.  If your periods continue to be more frequent than every 26 days, please see a gynecologist.  Try rolling your foot over a frozen water bottle and stretching the foot back in the mornings. If the pain continues, we can set you up with a podiatrist.     HPI    Chief Complaint   Patient presents with     Annual Exam     Patient fasting      Skin: The patient reports that she has had two moles on her arms that have had scarring and intermittent bleeding. The moles are not present today. Her father had something removed on his ear this past winter. She has seen the dermatologist for a full-body skin check.     Reproductive health: The patient reports that her menstrual cycle has been irregular since it came back on April 17th. Her daughter was born in July 2018 and she stopped pumping in January 2019.  She has had no bleeding in between periods. She is on the NuvaRing. It sometimes feels like it is slipping and close to coming out of her  "vagina. It does not currently feel like it is coming out. Otherwise she likes being on the NuvaRing because it is easier than a pill.     Foot pain: The patient reports that her feet have been very sore when she wakes up in the morning.  Once she gets up and walks around the pain goes away. She has had no lumps or redness, and has not started any new activities lately.     Mental health: The patient reports that she is no longer taking Zoloft for postpartum anxiety and depression. She is doing well.     Cardiac: The patient had an echocardiogram in 2016. The results were normal. Her cousin  from cardiomyopathy when she was 40.     Health Maintenance   Topic Date Due     INFLUENZA VACCINE RULE BASED (1) Due in the fall     PREVENTIVE CARE VISIT  Today and yearly     PAP SMEAR  Today     ADVANCE CARE PLANNING  Packet given     TD 18+ HE  2028     HIV SCREENING  Completed     TDAP ADULT ONE TIME DOSE  Completed        Patient Active Problem List   Diagnosis     Hyperlipidemia     Family history of hypertrophic cardiomyopathy     Environmental allergies     Current Outpatient Medications   Medication Sig     CHOLECALCIFEROL, VITAMIN D3, (VITAMIN D3 ORAL) Take 5,000 Units by mouth.      etonogestrel-ethinyl estradiol (NUVARING) 0.12-0.015 mg/24 hr vaginal ring Insert vaginally and leave in place for 3 consecutive weeks, then remove for 1 week.     fexofenadine (ALLEGRA) 180 MG tablet Take 180 mg by mouth daily.     FLUTICASONE FUROATE NASL into each nostril. 2 spray on both side two times a day     montelukast (SINGULAIR) 10 mg tablet Take 1 tablet (10 mg total) by mouth daily.     multivitamin therapeutic tablet Take 1 tablet by mouth daily.       Patient is a 34 y.o. female presents for a physical exam.    /74 (Patient Site: Left Arm, Patient Position: Sitting, Cuff Size: Adult Large)   Pulse 74   Temp 98.7  F (37.1  C) (Oral)   Ht 5' 5.16\" (1.655 m)   Wt 197 lb 3.2 oz (89.4 kg)   LMP 2019 " (Exact Date)   Breastfeeding? No   BMI 32.66 kg/m      General Appearance:    Alert, cooperative, no distress, appears stated age   Head:    Normocephalic, without obvious abnormality, atraumatic   Eyes:    PERRL, conjunctiva/corneas clear, EOM's intact, fundi     benign, both eyes   Ears:    Normal TM's and external ear canals, both ears   Nose:   Nares normal, septum midline, mucosa normal, no drainage     or sinus tenderness   Throat:   Lips, mucosa, and tongue normal; teeth and gums normal   Neck:   Supple, symmetrical, trachea midline, no adenopathy;     thyroid:  no enlargement/tenderness/nodules; no carotid    bruit or JVD   Back:     Symmetric, no curvature, ROM normal, no CVA tenderness   Lungs:     Clear to auscultation bilaterally, respirations unlabored   Chest Wall:    No tenderness or deformity    Heart:    Regular rate and rhythm, S1 and S2 normal, no murmur, rub    or gallop   Breast Exam:    No tenderness, masses, or nipple abnormality   Abdomen:     Soft, non-tender, bowel sounds active all four quadrants,     no masses, no organomegaly   Genitalia:    Normal female without lesion, discharge or tenderness       Extremities:   Extremities normal, atraumatic, no cyanosis or edema   Pulses:   2+ and symmetric all extremities   Skin:   Skin color, texture, turgor normal, no rashes or lesions. Right upper arm 3 mm brown papule, midly irregular in color. Left forearm 3 mm irregular macule.    Lymph nodes:   Cervical, supraclavicular, and axillary nodes normal   Neurologic:   CNII-XII intact, normal strength, sensation and reflexes     throughout       The following portions of the patient's history were reviewed and updated as appropriate: allergies, current medications, past family history, past medical history, past social history, past surgical history and problem list.    Review of Systems  Pertinent items are noted in HPI.  Immunization History   Administered Date(s) Administered     DT  (pediatric) 07/21/1998     HPV Quadrivalent 08/30/2007, 12/21/2007, 06/09/2008     Hep A, historic 11/24/2006, 08/30/2007     Influenza, inj, historic,unspecified 10/07/2010     Influenza, seasonal,quad inj 36+ mos 09/07/2016, 09/26/2018     Influenza, seasonal,quad inj 6-35 mos 11/26/2008, 01/05/2012     Influenza,seasonal quad, PF, 36+MOS 02/09/2015, 11/20/2017     MMR 07/01/1987, 07/21/1998     Meningococcal MCV4P 08/05/2004     Td,adult,historic,unspecified 12/27/2006     Tdap 12/27/2006, 06/22/2016, 05/24/2018     Typhoid Live, Oral 06/03/2013     Recent Results (from the past 240 hour(s))   Lipid [place] FASTING   Result Value Ref Range    Cholesterol 185 <=199 mg/dL    Triglycerides 70 <=149 mg/dL    HDL Cholesterol 67 >=50 mg/dL    LDL Calculated 104 <=129 mg/dL    Patient Fasting > 8hrs? Yes    Hepatitis B Surface Antibody (Anti-HBs) Vaccine Check   Result Value Ref Range    HBV Surface Ab (Vaccine Check) Positive Positive   Comprehensive Metabolic Panel   Result Value Ref Range    Sodium 138 136 - 145 mmol/L    Potassium 4.2 3.5 - 5.0 mmol/L    Chloride 105 98 - 107 mmol/L    CO2 27 22 - 31 mmol/L    Anion Gap, Calculation 6 5 - 18 mmol/L    Glucose 91 70 - 125 mg/dL    BUN 10 8 - 22 mg/dL    Creatinine 0.76 0.60 - 1.10 mg/dL    GFR MDRD Af Amer >60 >60 mL/min/1.73m2    GFR MDRD Non Af Amer >60 >60 mL/min/1.73m2    Bilirubin, Total 0.3 0.0 - 1.0 mg/dL    Calcium 9.5 8.5 - 10.5 mg/dL    Protein, Total 7.0 6.0 - 8.0 g/dL    Albumin 3.7 3.5 - 5.0 g/dL    Alkaline Phosphatase 60 45 - 120 U/L    AST 16 0 - 40 U/L    ALT 14 0 - 45 U/L   HM2(CBC w/o Differential)   Result Value Ref Range    WBC 7.4 4.0 - 11.0 thou/uL    RBC 4.46 3.80 - 5.40 mill/uL    Hemoglobin 13.5 12.0 - 16.0 g/dL    Hematocrit 40.1 35.0 - 47.0 %    MCV 90 80 - 100 fL    MCH 30.3 27.0 - 34.0 pg    MCHC 33.7 32.0 - 36.0 g/dL    RDW 11.2 11.0 - 14.5 %    Platelets 245 140 - 440 thou/uL    MPV 7.2 7.0 - 10.0 fL   Thyroid Cascade   Result Value  "Ref Range    TSH 0.66 0.30 - 5.00 uIU/mL   Prolactin   Result Value Ref Range    Prolactin 4.2 0.0 - 20.0 ng/mL     I have had an Advance Directives discussion with the patient.  Objective:    /74 (Patient Site: Left Arm, Patient Position: Sitting, Cuff Size: Adult Large)   Pulse 74   Temp 98.7  F (37.1  C) (Oral)   Ht 5' 5.16\" (1.655 m)   Wt 197 lb 3.2 oz (89.4 kg)   LMP 07/26/2019 (Exact Date)   Breastfeeding? No   BMI 32.66 kg/m         ADDITIONAL HISTORY SUMMARIZED (2): None.  DECISION TO OBTAIN EXTRA INFORMATION (1): None.   RADIOLOGY TESTS (1): None.  LABS (1): Labs ordered today.  MEDICINE TESTS (1): None.  INDEPENDENT REVIEW (2 each): None.     The visit lasted a total of 34 minutes face to face with the patient. Over 50% of the time was spent counseling and educating the patient about wellness.    I, Cheyanne Sosa, am scribing for and in the presence of, Dr. Rivera.    I, Dr. Rivera, personally performed the services described in this documentation, as scribed by Cheyanne Sosa in my presence, and it is both accurate and complete.    Total data points: 1  "

## 2021-05-31 NOTE — PATIENT INSTRUCTIONS - HE
Please send us a [employer] message with the dates of your hepatitis B shots.   Please check if there is any family history of colon cancer or colon polyps.   Please set up a dermtatology appointment for a full-body skin check and check the changin moles and have them send us the notes.   We are ordering a pelvic ultrasound for frequent menses.  Can call [phone] t set it up.  If your periods continue to be more frequent than every 26 days, please see a gynecologist.  Try rolling your foot over a frozen water bottle and stretching the foot back in the mornings. If the pain continues, we can set you up with a podiatrist.         Health Maintenance   Topic Date Due     INFLUENZA VACCINE RULE BASED (1) Due in the fall     PREVENTIVE CARE VISIT  Today and yearly     PAP SMEAR  Today     ADVANCE CARE PLANNING  Packet given     TD 18+ HE  05/24/2028     HIV SCREENING  Completed     TDAP ADULT ONE TIME DOSE  Completed

## 2021-06-01 VITALS — WEIGHT: 237 LBS | BODY MASS INDEX: 39.44 KG/M2

## 2021-06-01 VITALS — BODY MASS INDEX: 34.01 KG/M2 | WEIGHT: 204.38 LBS

## 2021-06-01 VITALS — BODY MASS INDEX: 38.99 KG/M2 | WEIGHT: 234.31 LBS

## 2021-06-01 VITALS — WEIGHT: 232.13 LBS | BODY MASS INDEX: 38.63 KG/M2

## 2021-06-01 VITALS — WEIGHT: 238.56 LBS | BODY MASS INDEX: 39.7 KG/M2

## 2021-06-01 VITALS — BODY MASS INDEX: 39.84 KG/M2 | WEIGHT: 239.44 LBS

## 2021-06-01 VITALS — WEIGHT: 232.38 LBS | BODY MASS INDEX: 38.67 KG/M2

## 2021-06-01 VITALS — BODY MASS INDEX: 38.2 KG/M2 | WEIGHT: 229.56 LBS

## 2021-06-01 VITALS — WEIGHT: 215.44 LBS | BODY MASS INDEX: 35.85 KG/M2

## 2021-06-01 VITALS — WEIGHT: 220.8 LBS | BODY MASS INDEX: 36.74 KG/M2

## 2021-06-01 VITALS — BODY MASS INDEX: 36.49 KG/M2 | WEIGHT: 219.3 LBS

## 2021-06-01 VITALS — WEIGHT: 234.06 LBS | BODY MASS INDEX: 38.95 KG/M2

## 2021-06-02 ENCOUNTER — AMBULATORY - HEALTHEAST (OUTPATIENT)
Dept: PEDIATRICS | Facility: CLINIC | Age: 36
End: 2021-06-02

## 2021-06-02 ENCOUNTER — RECORDS - HEALTHEAST (OUTPATIENT)
Dept: ADMINISTRATIVE | Facility: CLINIC | Age: 36
End: 2021-06-02

## 2021-06-02 VITALS — BODY MASS INDEX: 31.8 KG/M2 | WEIGHT: 191.13 LBS

## 2021-06-02 VITALS — BODY MASS INDEX: 32.8 KG/M2 | WEIGHT: 197.13 LBS

## 2021-06-02 VITALS — WEIGHT: 198 LBS | BODY MASS INDEX: 32.95 KG/M2

## 2021-06-03 ENCOUNTER — COMMUNICATION - HEALTHEAST (OUTPATIENT)
Dept: FAMILY MEDICINE | Facility: CLINIC | Age: 36
End: 2021-06-03

## 2021-06-03 VITALS — HEIGHT: 65 IN | WEIGHT: 197.8 LBS | BODY MASS INDEX: 32.96 KG/M2

## 2021-06-03 VITALS — BODY MASS INDEX: 33.1 KG/M2 | WEIGHT: 198.9 LBS

## 2021-06-03 VITALS
RESPIRATION RATE: 16 BRPM | TEMPERATURE: 98.1 F | SYSTOLIC BLOOD PRESSURE: 127 MMHG | DIASTOLIC BLOOD PRESSURE: 79 MMHG | HEART RATE: 84 BPM | OXYGEN SATURATION: 98 %

## 2021-06-03 VITALS — BODY MASS INDEX: 32.86 KG/M2 | HEIGHT: 65 IN | WEIGHT: 197.2 LBS

## 2021-06-03 NOTE — PATIENT INSTRUCTIONS - HE
1.  Take antibiotic according to bottle instructions with food to avoid stomach upset.  If you are prone to stomach upset with antibiotics, I recommend adding a probiotic to this regimen.  Culturelle is a trusted brand.  2.  I recommend using Mucinex to help thin mucus secretions.  3.  Take Advil or Tylenol as needed for pain or fever control.  4.  Follow-up if you not having any improvement in your symptoms over the next 5 days.

## 2021-06-05 VITALS — WEIGHT: 230 LBS | BODY MASS INDEX: 37.69 KG/M2 | BODY MASS INDEX: 37.98 KG/M2 | HEIGHT: 65 IN

## 2021-06-06 ENCOUNTER — RECORDS - HEALTHEAST (OUTPATIENT)
Dept: FAMILY MEDICINE | Facility: CLINIC | Age: 36
End: 2021-06-06

## 2021-06-06 DIAGNOSIS — Z36.9 ENCOUNTER FOR ANTENATAL SCREENING OF MOTHER: ICD-10-CM

## 2021-06-06 DIAGNOSIS — Z33.1 PREGNANT STATE, INCIDENTAL: ICD-10-CM

## 2021-06-06 NOTE — TELEPHONE ENCOUNTER
New Appointment Needed  What is the reason for the visit:    Colposcopy  Provider Preference: PCP only  How soon do you need to be seen?: scheduling to end of May, Patient scheduled online as OV 3/12 and was asked to re-schedule, Please advise if patient can be seen before end of May.  Waitlist offered?: No  Okay to leave a detailed message:  Yes

## 2021-06-06 NOTE — PROGRESS NOTES
Colposcopy Procedure Note    Melisa Daily is a 34 y.o. female is here today for a Colposcopy. She was last seen in clinic 02/24/2020 for an office visit and pap.    Normal paps but persistent positive high risk HPV, Thus the reason that a colposcopy is being performed today (03/12/2020).  Colposcopy done today without any abnormal lesions.    Plan for Pap in 6 months at [facility].    Physical in August 21 or after.    Review of Systems:  All other systems are negative.     PFSH:  She will be traveling to [place].    Indications: Pap smear 1 months ago showed: no abnormalities and Normal pap but persistent high risk HPV. The prior pap showed same.  Prior cervical/vaginal disease: None. Prior cervical treatment: no treatment.    Procedure Details   The reason for procedure is explained and informed consent obtained.    Speculum placed in vagina and visualization of cervix achieved, cervix swabbed with 3% acetic acid solution. Cervix is also swabbed with Lugol's solution. Procedure details:  Cervix swabbed with sterile saline, acetic acid and Lugol's solution and no visable lesions seen.  Reids colonoscopic index of 0.  No biopsies taken.    Findings:  Cervix: no visible lesions;       Specimens: See orders    Complications: none.    Plan:  Role of HPV in causing cervical pap smear abnormalities, dysplasia, and cancer is reviewed with the patient. She will be contacted in a few days with biopsy results and recommendations.        ADDITIONAL HISTORY SUMMARIZED (2): None.  DECISION TO OBTAIN EXTRA INFORMATION (1): None.   RADIOLOGY TESTS (1): None.  LABS (1): Lab ordered today.  MEDICINE TESTS (1): None.  INDEPENDENT REVIEW (2 each): None.     The visit lasted a total of 32 minutes face to face with the patient. Over 50% of the time was spent counseling and educating the patient about colposcopy.    Rose LOOMIS am scribing for and in the presence of, Dr. Rivera.    Dr. Miguel LOOMIS, personally performed the services  described in this documentation, as scribed by Rose Napier in my presence, and it is both accurate and complete.    Total data points: 1

## 2021-06-06 NOTE — PROGRESS NOTES
Assessment:   1. Cervical high risk HPV (human papillomavirus) test positive  Gynecologic Cytology (PAP Smear)   2. Acute non-recurrent maxillary sinusitis  doxycycline (MONODOX) 100 MG capsule   3. Cough  albuterol (PROAIR HFA;PROVENTIL HFA;VENTOLIN HFA) 90 mcg/actuation inhaler       Plan:   Patient Instructions     If wishing to restart birth control let me know.   Return for annual physical in 6 months.   __________________________________________________________________    Pap today as a follow up to the repeat pap that was normal but with a positive HPV in August. If pap returns today is positive for HPV or pap is abnormal we will have you return for a colposcopy.    Your lab results will be communicated to you via [employer] in 1-2 weeks.  Please refrain from sending messages on one specific lab until they are all back. Thank you.  __________________________________________________________________    Recommend starting antibiotic and inhaler to treat cough and sinusitis.   Start doxycycline 100 mg twice daily for 10 days.     Start ventolin inhaler 2 puffs up to four times daily, as needed for cough.   The inhaler can be stimulating, if you use at night recommend melatonin afterwards to aid in sleep.  Always use with spacer.   __________________________________________________________________    If you choose to use [facility] to send a provider a message please keep this very brief.  They should only be used for a follow-up questions to a previous appointment.  New concerns should addressed by a phone call to triage, E -visit or an office visit.  Certainly if it is an emergency call 911.  If there are labor related questions please call [facility] or [facility].  Thank-you.         Subjective:  Chief Complaint   Patient presents with     Follow-up     Repeat pap smear, last pap 8/21/19 normal with positive HPV     Cough     c/o coughing X3 weeks, sometimes productive, recently chest feeling tight, some  "wheezing, intermittent headaches, nasal congestion       Melisa Daily, a 34 y.o. year old, comes in to clinic for a repeat pap smear and presents with a cough onset 3 weeks ago.     Abnormal pap smear: Melisa was seen 8/21/19 for her annual wellness visit. Her pap returned normal but positive for HPV. It was recommended she return for a follow up pap in approximately 6 months. She initially tested positive for HPV 1/10/18. She stopped the NuvaRing in December because it kept \"falling out\". Melisa is hoping that discontinuing this form of contraception will regulate her menstrual cycle. She would like to continue without birth control and endorses using condoms. She is on a multivitamin with folic acid.     Cough: She complains of a intermittently productive cough onset 3 weeks ago. Melisa is unable to breathe deeply or laugh with these actions triggering a cough. The cough is throughout the day but worst at night. She wakes up coughing every night. She endorses nasal congestion, tightness in her sinuses, and intermittent ear pain. She has been afebrile during the course of these symptoms. She postulates she had a sinus infection in the fall. Everyone at home received their influenza vaccine, except for her . All of her family members are presenting with similar symptoms. She has never been diagnosed with asthma, reactive airway disease, or needed an inhaler.     Current Outpatient Medications   Medication Sig     CHOLECALCIFEROL, VITAMIN D3, (VITAMIN D3 ORAL) Take 5,000 Units by mouth.      FLUTICASONE FUROATE NASL into each nostril. 2 spray on both side two times a day     loratadine (CLARITIN) 10 mg tablet Take 10 mg by mouth daily.     montelukast (SINGULAIR) 10 mg tablet Take 1 tablet (10 mg total) by mouth daily.     multivitamin therapeutic tablet Take 1 tablet by mouth daily.     albuterol (PROAIR HFA;PROVENTIL HFA;VENTOLIN HFA) 90 mcg/actuation inhaler Inhale 2 puffs 4 (four) times a day as needed for " wheezing. Please give cheap spacer if available     doxycycline (MONODOX) 100 MG capsule Take 1 capsule (100 mg total) by mouth 2 (two) times a day for 10 days.       Patient Active Problem List   Diagnosis     Hyperlipidemia     Family history of hypertrophic cardiomyopathy     Environmental allergies     ROS: Positive for cough, intermittent ear pain, and some sinsu pressure. No fevers, chills, chest pain, shortness of breath, joint pain, rash, lower extremity edema    PFSH: She is a nonsmoker. She denies a new sexual partner. The family will be traveling to [place] in a couple weeks.     Objective:  /69 (Patient Site: Left Arm, Patient Position: Sitting, Cuff Size: Adult Large)   Pulse 78   Temp 98.1  F (36.7  C) (Oral)   Resp 16   Wt 212 lb 12.8 oz (96.5 kg)   LMP 02/16/2020 (Exact Date)   SpO2 98%   BMI 35.24 kg/m       General: No apparent distress  HEENT: Sclera and conjunctiva clear, tympanic membranes gray good light reflex. Posterior oropharynx is sightly erythematous. Some pressure or discomfort with palpation to cheeks and forehead.   Cardiovascular: Regular rate and rhythm without murmurs, rubs, or gallops  Lungs: Good air entry, no wheezes, crackles, or rhonchi. Coarse breath sounds throughout all lung fields.  Abdomen: Soft, non-tender, no masses, no rebound or guarding  : Normal external female genitalia.   Neuro: Alert and oriented x 3, cranial nerves II-XII intact  Skin: warm and dry, no rashes    ADDITIONAL HISTORY SUMMARIZED (2): 8/21/19 physical note reviewed.  DECISION TO OBTAIN EXTRA INFORMATION (1): None.   RADIOLOGY TESTS (1): None.  LABS (1): 8/21/19 labs reviewed and ordered today.  MEDICINE TESTS (1): None.  INDEPENDENT REVIEW (2 each): None.     The visit lasted a total of 25 minutes face to face with the patient. Over 50% of the time was spent counseling and educating the patient about abnormal pap smear and cough.    ICookie am scribing for and in the presence  of, Dr. Rivera.    I, Dr. Harleen Rivera MD, personally performed the services described in this documentation, as scribed by Cookie Sainz in my presence, and it is both accurate and complete.    Total data points: 3

## 2021-06-06 NOTE — PATIENT INSTRUCTIONS - HE
If wishing to restart birth control let me know.   Return for annual physical in 6 months.   __________________________________________________________________    Pap today as a follow up to the repeat pap that was normal but with a positive HPV in August. If pap returns today is positive for HPV or pap is abnormal we will have you return for a colposcopy.    Your lab results will be communicated to you via [employer] in 1-2 weeks.  Please refrain from sending messages on one specific lab until they are all back. Thank you.  __________________________________________________________________    Recommend starting antibiotic and inhaler to treat cough and sinusitis.   Start doxycycline 100 mg twice daily for 10 days.     Start ventolin inhaler 2 puffs up to four times daily, as needed for cough.   The inhaler can be stimulating, if you use at night recommend melatonin afterwards to aid in sleep.  Always use with spacer.   __________________________________________________________________    If you choose to use [employer] to send a provider a message please keep this very brief.  They should only be used for a follow-up questions to a previous appointment.  New concerns should addressed by a phone call to triage, E -visit or an office visit.  Certainly if it is an emergency call 911.  If there are labor related questions please call [facility] or [facility].  Thank-you.

## 2021-06-06 NOTE — PATIENT INSTRUCTIONS - HE
Colposcopy done today without any abnormal lesions.    Plan for Pap in 6 months at [facility].    Physical in August 21 or after.        If you choose to use [employer] to send a provider a message please keep this very brief.  They should only be used for a follow-up questions to a previous appointment.  New concerns should addressed by a phone call to triage, E -visit or an office visit.  Certainly if it is an emergency call 911. Thank-you.    Your lab results will be communicated to you via letter, [employer] message or phone call when they are all back.  Please refrain from sending messages on one specific lab until they are all back.  Thank you.

## 2021-06-06 NOTE — TELEPHONE ENCOUNTER
I am happy to see her anytime.  Can use to reserved spots or hospital follow-up spots.  If needed could use a 120 appointment on a Wednesday or Thursday for 40 minutes.  Thank you.

## 2021-06-09 NOTE — TELEPHONE ENCOUNTER
Scheduled patient for 07/07/20. It was the soonest I could get her in and she will need a refill to make it to her appointment. She has enough for this week only. Can she get a refill until her appointment?

## 2021-06-09 NOTE — PROGRESS NOTES
Discharge Summary      Dates of service 09/13/2016 to 11/09/2016 # Sessions completed: 4    Diagnosis at Intake  Postpartum depression  Postpartum anxiety    Diagnosis at Discharge  Postpartum depression  Postpartum anxiety      Additional comments: Goals not established due to 4 sessions only    Reason for discharge:Symptoms appeared to be resolving and patient felt capable of maintaining her improved daily functioning     Prognosis at discharge:Good    Recommendations and referrals at discharge: Patient to return to individual psychotherapy if needed        Cookie Hager      3/28/2017  9:44 AM

## 2021-06-09 NOTE — PROGRESS NOTES
"Melisa Daily is a 35 y.o. female who is being evaluated via a billable video visit.      The patient has been notified of following:     \"This video visit will be conducted via a call between you and your physician/provider. We have found that certain health care needs can be provided without the need for an in-person physical exam.  This service lets us provide the care you need with a video conversation.  If a prescription is necessary we can send it directly to your pharmacy.  If lab work is needed we can place an order for that and you can then stop by our lab to have the test done at a later time.    Video visits are billed at different rates depending on your insurance coverage. Please reach out to your insurance provider with any questions.    If during the course of the call the physician/provider feels a video visit is not appropriate, you will not be charged for this service.\"    Patient has given verbal consent to a Video visit? Yes  How would you like to obtain your AVS? larry  Patient would like the video invitation sent by: gretchen  Will anyone else be joining your video visit? No        Video Start Time: 0139    Additional provider notes: Chief complaint: Allergies    History of present illness: This is a pleasant 35-year-old woman who is here today for allergies.  Patient of Dr. Pinto.  Last seen in May last year.  She takes Claritin and montelukast.  This adequately controls her symptoms.  She needs a refill of montelukast.  She uses Flonase as needed.  No eyedrops.  No history of asthma.  Denies cough, wheeze or shortness of breath.    Past medical history, social history, family medical history, meds and allergies reviewed and updated accordingly.  Of note, mother has asthma.    Review of Systems performed as above and the remainder is negative.      Current Outpatient Medications:      CHOLECALCIFEROL, VITAMIN D3, (VITAMIN D3 ORAL), Take 5,000 Units by mouth. , Disp: , Rfl:      " FLUTICASONE FUROATE NASL, into each nostril. 2 spray on both side two times a day, Disp: , Rfl:      loratadine (CLARITIN) 10 mg tablet, Take 10 mg by mouth daily., Disp: , Rfl:      montelukast (SINGULAIR) 10 mg tablet, Take 1 tablet (10 mg total) by mouth daily., Disp: 30 tablet, Rfl: 0     multivitamin therapeutic tablet, Take 1 tablet by mouth daily., Disp: , Rfl:     Allergies   Allergen Reactions     Ragweed Pollen Other (See Comments)     Runny nose, itchy eyes, sinus congestion, seasonal symptoms.     Hydrocodone-Acetaminophen Nausea And Vomiting       Height 5 foot 5 inches tall weight, 211 pounds, pulse 60    Gen: Pleasant female not in acute distress  HEENT: Eyes no erythema of the bulbar or palpebral conjunctiva, no edema.  Nose: No congestion, Mouth: Throat clear, no lip or tongue edema.   Neck: Full range of motion, no swelling  Respiratory: No coughing with deep breathing, no retractions  Lymph: No supraclavicular or cervical lymphadenopathy  Skin: No rashes or lesions  Neuro: Alert and oriented times 3    Impression report and plan:  1.  Allergic rhinitis    Continue montelukast.  If she is doing well with her symptoms, she can follow as needed.  Needs to follow yearly if she desires refills from our clinic.      Video-Visit Details    Type of service:  Video Visit    Video End Time (time video stopped): 0144  Originating Location (pt. Location):home    Distant Location (provider location):  [facility]     Platform used for Video Visit: severianowell

## 2021-06-09 NOTE — PROGRESS NOTES
ASSESSMENT & PLAN:  1. Bruising  Iron and Transferrin Iron Binding Capacity    HM1(CBC and Differential)    Morphology, Path Smear Review (MORP)    INR    APTT(PTT)    Comprehensive Metabolic Panel    Thyroid Cascade    Vitamin D, Total (25-Hydroxy)    Vitamin B12    Magnesium    HM1 (CBC with Diff)   2. Fatigue  Iron and Transferrin Iron Binding Capacity    HM1(CBC and Differential)    Morphology, Path Smear Review (MORP)    INR    APTT(PTT)    Comprehensive Metabolic Panel    Thyroid Cascade    Vitamin D, Total (25-Hydroxy)    Vitamin B12    Magnesium    HM1 (CBC with Diff)       Patient Instructions   Labs as ordered.     Continue prenatal vitamin.     Start iron supplement daily with breakfast, script sent. Likely continue for 3-6 months.    Bring stool softener such as Colase or Docusate with you on vacation. Can take 100 to 400 mg at bedtime as needed.      Orders Placed This Encounter   Procedures     Iron and Transferrin Iron Binding Capacity     Morphology, Path Smear Review (MORP)     INR     APTT(PTT)     Comprehensive Metabolic Panel     Thyroid Cascade     Vitamin D, Total (25-Hydroxy)     Vitamin B12     Magnesium     HM1 (CBC with Diff)     Medications Discontinued During This Encounter   Medication Reason     UNABLE TO FIND        No Follow-up on file.    CHIEF COMPLAINT:  Chief Complaint   Patient presents with     Bleeding/Bruising     leg bruising, dizziness and fatigue recently noted       HISTORY OF PRESENT ILLNESS:  Melisa is a 31 y.o. female presenting to the clinic today for bleeding and bruising of her leg. In the past 2-3 weeks, she has noticed multiple bruises on her legs and arms with no apparent cause. She is wondering if this is related to her iron levels during breast feeding.  She has had some bloody gums since pregnancy with gum infection, for which she has been seen by the dentist. She has not had her period yet while breastfeeding, but has had some spotting. She denies any  abdominal pain, blood in her stool, or acid reflux symptoms.     Fatigue: She has been fatigued, which has continued even though her daughter has started sleeping through the night and she is getting enough sleep. She feels similarly fatigued as previously, when her daughter was waking during the night and continues to wake up tired. In the past week, she has felt dizziness, which is a new symptom for her.     REVIEW OF SYSTEMS:   She denies any redness or warmth in her calves. All other systems are negative.    PFSH:  She has no history of gestational diabetes. She is traveling to [place] later this week.     TOBACCO USE:  History   Smoking Status     Never Smoker   Smokeless Tobacco     Never Used       VITALS:  Vitals:    02/22/17 1009   BP: 112/78   Patient Site: Left Arm   Patient Position: Sitting   Cuff Size: Adult Large   Pulse: 64   Resp: 16   Temp: 98.2  F (36.8  C)   TempSrc: Oral   Weight: 197 lb (89.4 kg)     Wt Readings from Last 3 Encounters:   02/22/17 197 lb (89.4 kg)   09/07/16 207 lb 1.9 oz (93.9 kg)   09/02/16 208 lb 1.9 oz (94.4 kg)     Body mass index is 32.78 kg/(m^2).    PHYSICAL EXAM:  General Appearance: Alert, cooperative, no distress, appears stated age  Head: Normocephalic, without obvious abnormality, atraumatic  Eyes: PERRL, conjunctiva/corneas clear, EOM's intact both eyes  Ears: Normal TM's and external ear canals, both ears  Neck: Supple, symmetrical, trachea midline, no adenopathy; Thyroid: no enlargement/tenderness/nodules; no carotid bruit or JVD  Back: Symmetric, no curvature, no CVA tenderness  Lungs: Clear to auscultation bilaterally, respirations unlabored  Heart: Regular rate and rhythm, S1 and S2 normal, no murmur, rub   or gallop  Extremities: extremities normal, no cyanosis or edema  Skin: Skin color, texture, turgor normal, no rashes or lesions, healed scar on back, 5 cm bruise on medial left knee    ADDITIONAL HISTORY SUMMARIZED (2): None.  DECISION TO OBTAIN EXTRA  INFORMATION (1): None.   RADIOLOGY TESTS (1): None.  LABS (1): Ordered labs. Reviewed labs from 9/7/16.  MEDICINE TESTS (1): None.  INDEPENDENT REVIEW (2 each): None.     The visit lasted a total of 16 minutes face to face with the patient. Over 50% of the time was spent counseling and educating the patient about bleeding and bruising.    I, Demetria Ramírez, am scribing for and in the presence of, Dr. Rivera.    I, Dr. Rivera personally performed the services described in this documentation, as scribed by Demetria Ramírez in my presence, and it is both accurate and complete.    MEDICATIONS:  Current Outpatient Prescriptions   Medication Sig Dispense Refill     budesonide (RHINOCORT AQUA) 32 mcg/actuation nasal spray 2 sprays into each nostril daily. 1 Bottle 2     CHOLECALCIFEROL, VITAMIN D3, (VITAMIN D3 ORAL) Take 2,000 Units by mouth.       fexofenadine (ALLEGRA ALLERGY) 60 MG tablet Take 60 mg by mouth daily.       norethindrone (ZEYAD) 0.35 mg tablet Take 1 tablet (0.35 mg total) by mouth daily. 90 tablet 3     OMEGA-3/DHA/EPA/FISH OIL (FISH OIL-OMEGA-3 FATTY ACIDS) 300-1,000 mg capsule Take 1 g by mouth daily.       prenatal vitamin iron-folic acid 27mg-0.8mg (PRENATAL S) 27 mg iron- 800 mcg Tab tablet Take 1 tablet by mouth daily.       sertraline (ZOLOFT) 50 MG tablet Take 1 tablet (50 mg total) by mouth daily. 90 tablet 1     ferrous sulfate 325 (65 FE) MG tablet Take 1 tablet (325 mg total) by mouth daily with breakfast. 90 tablet 1     No current facility-administered medications for this visit.        Total data points: 1

## 2021-06-10 NOTE — PROGRESS NOTES
Assessment:   The encounter diagnosis was Strain of lumbar region, initial encounter.     Plan:     Medications Ordered   Medications     cyclobenzaprine (FLEXERIL) 10 MG tablet     Sig: Take 1 tablet (10 mg total) by mouth 3 (three) times a day as needed for muscle spasms.     Dispense:  30 tablet     Refill:  0     Patient Instructions     Caring for Your Back    You are not alone.    Low back pain is very common. Nearly half of all adults will have low back pain in any given year. The good news is that back pain is rarely a danger to your health. Most people can manage their back pain on their own. About half of people start feeling better within two weeks. In 9 out of 10 cases, low back pain goes away or no longer limits daily activity within 6 weeks.     Your outlook is good!     Your symptoms tell us that your low back pain is most likely not a danger to you. Most of the time we will not know the exact cause of low back pain, even if you see a doctor or have an MRI. However, treatment can still work without knowing the cause of the pain. Less than 1 in 100 people need surgery for their back pain.     What can I do about my low back pain?     There are three basic things you can do to ease low back pain and help it go away.     Use heat or cold packs.    Take medicine as directed.    Use positions, movements and exercises.    Using heat or cold packs:    Try cold packs or gentle heat to ease your pain.  Use whichever gives you the most relief. Apply the cold pack or heat for 15 minutes at a time, as often as needed.    Taking medicine:    If taking over-the-counter medicine:    Take ibuprofen (Advil, Motrin) 600 mg three times a day as needed for pain.  OR    Take Aleve (naproxen) 220 to 440 mg two times a day as needed for pain. If your doctor prescribed a muscle relaxant (cyclobenzaprine 10 mg.):    Take   to 1 tablet at bedtime.    Do not drive when taking this medicine. This drug may make you sleepy.      Using positions, movements and exercises:    Research tells us that moving your joints and muscles can help you recover from back pain. Such activity should be simple and gentle. Use the positions below as well as walking to help relieve your pain. Try taking a short walk every 3 to 4 hours during the day. Walk for a few minutes inside your home or take longer walks outside, on a treadmill or at a mall. Slowly increase the amount of time you walk. Expect discomfort when you begin, but it should lessen as your back starts to heal. When your back feels better, walk daily to keep your back and body healthy.    Finding a position that is comfortable:    When your back pain is new, certain positions will ease your pain. Gently try each of the positions below until you find one that is helpful. Once you find a position of comfort, use it as often as you like when you are resting. You will recover faster if you combine rest with activity.    * Lie on your back with your legs bent. You can do this by placing a pillow under your knees or lie on the floor and rest your lower legs on the seat of a chair.  * Lie on your side with your knees bent and place a pillow between your knees.    Lie on your stomach over pillows.       When should I call my doctor?    Your back pain should improve over the first couple of weeks. As it improves, you should be able to return to your normal activities.  But call your doctor if:      You have a sudden change in your ability to control your bladder or bowels.    You begin to feel tingling in your groin or legs.    The pain spreads down your leg and into your foot.    Your toes, feet or leg muscles begin to feel weak.    You feel generally unwell or sick.    Your pain gets worse.    If you are deaf or hard of hearing, please let us know. We provide many free services including sign language interpreters, oral interpreters, TTYs, telephone amplifiers, note takers and written  materials.    For informational purposes only. Not to replace the advice of your health care provider. Copyright   2013 [facility]. All rights reserved. [employer] 599156 - 04/13.      Mini Relaxation Exercises  Source www.tobaccofreeu.org    Mini relaxation exercises are focused breathing techniques that help reduce  anxiety and tension immediately. You can do them with your eyes open or  closed. You can do them any place, at any time; no one will know that you are  doing them.    Good Times to Do a  Mini     Before taking an exam    While at the computer lab waiting for your document to print    While waiting in line    When someone says something that bothers you    While waiting for the announcement of a grade    While waiting for a phone call    In the dentist s chair    When you feel overwhelmed by what you need to accomplish in the near  future    When in pain    When you want to     Mini Version 1- Breath Focused  Position yourself in a supportive comfortable chair or lying in a position that is least painful. (Often this is on your back with pillows under your knees)    a. Count very slowly to yourself from ten down to zero, one number for  each breath. Thus, with the first diaphragmatic breath, you say  ten  to  yourself, with the next breath, you say  nine , etc. If you start feeling  light-headed or dizzy, slow down the counting. When you get to  zero ,  see how you are feeling. If you are feeling better, great! If not, try to  do it again.    b. As you inhale, count very slowly up to four; as you exhale, count slowly  back down to one. Thus, as you inhale, you say to yourself  one, two,  three, four , as you exhale, you say to yourself  four, three, two, one .  Do this several times.    c. After each inhalation, pause for a few seconds; after you exhale, pause  again for a few seconds. Do this for several breaths.    Mini Version 2- Physical Focused  a. Massage your forehead, jaw, back of  neck, and shoulders  b. Stretch and yawn  c. Walk counting four paces as you breathe in and four paces as you  breathe out  d. Coordinate your breath with any activity, for example, writing, jogging,  drawing, lifting weights, walking, etc.    Mini Version 3- Imagery Focused  Position yourself in a supportive comfortable chair or lying in a position that is least painful. (Often this is on your back with pillows under your knees)    a. Briefly picture yourself in a place you find relaxing  b. Contemplate a picture of a natural scene  c. Imagine the characteristics of one of the following or any other object  which portrays characteristics you find calming or supportive in the  moment:    Tree (strong, rooted, expansive)    Mountain (timeless, strong, stable)    Waves (fluid, limitless)    Sun (radiant, warm)    Wind (free)    Mini Version 4- Mindfulness Focused  a. Look out the window for a few moments  b. Notice something new  c. Listen. What is the most distant sound you hear? The next distant?  d. Appreciate the sensation of being in the situation, the feel, smell, sight of  certain objects  e. Focus on being exactly where you are, keeping your thoughts from flowing  into the future or past      Return for further follow up if needed. Call [phone]) or schedule an appointment via [employer]..    Subjective:   Melisa Daily is a 35 y.o. female who submitted an eVisit request for evaluation of her Back Pain.  See the questionnaire and message section of encounter report for information related to history of present illness and review of systems.    The following portions of the patient's history were reviewed and updated as appropriate:  She does not have any pertinent problems on file.  She is allergic to ragweed pollen and hydrocodone-acetaminophen..     Objective:   No exam performed today, patient submitted as eVisit.  Provider E-Visit time total (minutes): 8

## 2021-06-10 NOTE — PATIENT INSTRUCTIONS - HE
Caring for Your Back    You are not alone.    Low back pain is very common. Nearly half of all adults will have low back pain in any given year. The good news is that back pain is rarely a danger to your health. Most people can manage their back pain on their own. About half of people start feeling better within two weeks. In 9 out of 10 cases, low back pain goes away or no longer limits daily activity within 6 weeks.     Your outlook is good!     Your symptoms tell us that your low back pain is most likely not a danger to you. Most of the time we will not know the exact cause of low back pain, even if you see a doctor or have an MRI. However, treatment can still work without knowing the cause of the pain. Less than 1 in 100 people need surgery for their back pain.     What can I do about my low back pain?     There are three basic things you can do to ease low back pain and help it go away.     Use heat or cold packs.    Take medicine as directed.    Use positions, movements and exercises.    Using heat or cold packs:    Try cold packs or gentle heat to ease your pain.  Use whichever gives you the most relief. Apply the cold pack or heat for 15 minutes at a time, as often as needed.    Taking medicine:    If taking over-the-counter medicine:    Take ibuprofen (Advil, Motrin) 600 mg three times a day as needed for pain.  OR    Take Aleve (naproxen) 220 to 440 mg two times a day as needed for pain. If your doctor prescribed a muscle relaxant (cyclobenzaprine 10 mg.):    Take   to 1 tablet at bedtime.    Do not drive when taking this medicine. This drug may make you sleepy.     Using positions, movements and exercises:    Research tells us that moving your joints and muscles can help you recover from back pain. Such activity should be simple and gentle. Use the positions below as well as walking to help relieve your pain. Try taking a short walk every 3 to 4 hours during the day. Walk for a few minutes inside your  home or take longer walks outside, on a treadmill or at a mall. Slowly increase the amount of time you walk. Expect discomfort when you begin, but it should lessen as your back starts to heal. When your back feels better, walk daily to keep your back and body healthy.    Finding a position that is comfortable:    When your back pain is new, certain positions will ease your pain. Gently try each of the positions below until you find one that is helpful. Once you find a position of comfort, use it as often as you like when you are resting. You will recover faster if you combine rest with activity.    * Lie on your back with your legs bent. You can do this by placing a pillow under your knees or lie on the floor and rest your lower legs on the seat of a chair.  * Lie on your side with your knees bent and place a pillow between your knees.    Lie on your stomach over pillows.       When should I call my doctor?    Your back pain should improve over the first couple of weeks. As it improves, you should be able to return to your normal activities.  But call your doctor if:      You have a sudden change in your ability to control your bladder or bowels.    You begin to feel tingling in your groin or legs.    The pain spreads down your leg and into your foot.    Your toes, feet or leg muscles begin to feel weak.    You feel generally unwell or sick.    Your pain gets worse.    If you are deaf or hard of hearing, please let us know. We provide many free services including sign language interpreters, oral interpreters, TTYs, telephone amplifiers, note takers and written materials.    For informational purposes only. Not to replace the advice of your health care provider. Copyright   2013 [facility]. All rights reserved. [employer] [id number] - 04/13.      Mini Relaxation Exercises  Source www.tobaccofreeu.org    Mini relaxation exercises are focused breathing techniques that help reduce  anxiety and tension  immediately. You can do them with your eyes open or  closed. You can do them any place, at any time; no one will know that you are  doing them.    Good Times to Do a  Mini     Before taking an exam    While at the computer lab waiting for your document to print    While waiting in line    When someone says something that bothers you    While waiting for the announcement of a grade    While waiting for a phone call    In the dentist s chair    When you feel overwhelmed by what you need to accomplish in the near  future    When in pain    When you want to     Mini Version 1- Breath Focused  Position yourself in a supportive comfortable chair or lying in a position that is least painful. (Often this is on your back with pillows under your knees)    a. Count very slowly to yourself from ten down to zero, one number for  each breath. Thus, with the first diaphragmatic breath, you say  ten  to  yourself, with the next breath, you say  nine , etc. If you start feeling  light-headed or dizzy, slow down the counting. When you get to  zero ,  see how you are feeling. If you are feeling better, great! If not, try to  do it again.    b. As you inhale, count very slowly up to four; as you exhale, count slowly  back down to one. Thus, as you inhale, you say to yourself  one, two,  three, four , as you exhale, you say to yourself  four, three, two, one .  Do this several times.    c. After each inhalation, pause for a few seconds; after you exhale, pause  again for a few seconds. Do this for several breaths.    Mini Version 2- Physical Focused  a. Massage your forehead, jaw, back of neck, and shoulders  b. Stretch and yawn  c. Walk counting four paces as you breathe in and four paces as you  breathe out  d. Coordinate your breath with any activity, for example, writing, jogging,  drawing, lifting weights, walking, etc.    Mini Version 3- Imagery Focused  Position yourself in a supportive comfortable chair or lying in a position  that is least painful. (Often this is on your back with pillows under your knees)    a. Briefly picture yourself in a place you find relaxing  b. Contemplate a picture of a natural scene  c. Imagine the characteristics of one of the following or any other object  which portrays characteristics you find calming or supportive in the  moment:    Tree (strong, rooted, expansive)    Mountain (timeless, strong, stable)    Waves (fluid, limitless)    Sun (radiant, warm)    Wind (free)    Mini Version 4- Mindfulness Focused  a. Look out the window for a few moments  b. Notice something new  c. Listen. What is the most distant sound you hear? The next distant?  d. Appreciate the sensation of being in the situation, the feel, smell, sight of  certain objects  e. Focus on being exactly where you are, keeping your thoughts from flowing  into the future or past

## 2021-06-11 NOTE — PATIENT INSTRUCTIONS - HE
Patient is on a prenatal vitamin.  She will check that it has folic acid, iron and DHA.    Singulair has an unknown safety in pregnancy so she will stop that.  Her other meds are safe.    Ordered an early OB ultrasound to be done in 1 week or after.    Discussed cell free DNA testing and patient would like to have this done at her first OB appointment.  Her 's half-sister does have Down syndrome.    She will check that she is on a max dose of 2000 units of vitamin D3.    She is advanced maternal age at the time of delivery so she will have a level 2 ultrasound at 20 weeks.    Also discussed alpha-fetoprotein at 16 weeks for spina bifida.

## 2021-06-11 NOTE — PROGRESS NOTES
"Melisa Daily is a 35 y.o. female who is being evaluated via a billable telephone visit.      The patient has been notified of following:     \"This telephone visit will be conducted via a call between you and your physician/provider. We have found that certain health care needs can be provided without the need for a physical exam.  This service lets us provide the care you need with a short phone conversation.  If a prescription is necessary we can send it directly to your pharmacy.  If lab work is needed we can place an order for that and you can then stop by our lab to have the test done at a later time.    Telephone visits are billed at different rates depending on your insurance coverage. During this emergency period, for some insurers they may be billed the same as an in-person visit.  Please reach out to your insurance provider with any questions.    If during the course of the call the physician/provider feels a telephone visit is not appropriate, you will not be charged for this service.\"    Patient has given verbal consent to a Telephone visit? Yes    What phone number would you like to be contacted at? [phone]    Patient would like to receive their AVS by AVS Preference: Deandre.    Additional provider notes:   Assessment:   Pregnancy confirmation  1. Missed menses  US OB < 14 Weeks With Transvaginal    cholecalciferol, vitamin D3, (VITAMIN D3) 50 mcg (2,000 unit) Tab       Plan:    Counseled patient on early pregnancy issues and plans for continued pregnancy:  - OTC meds safe in early pregnancy,  - importance of prenatal vitamins, and routine activities without restrictions.    - importance of ETOH abstinence and no other drug use  Informed of signs and symptoms of miscarriage and to call with questions of spotting/bleeding management and possible need to come in for evaluation before routine visit at 10-12 weeks.   Set 40 minute visit at 12 weeks pregnant.  Discussed information in the OB " packet.  Early genetic testing wanted.  Can call for script of Phenergan or Reglan if needed for nausea.        Patient is on a prenatal vitamin.  She will check that it has folic acid, iron and DHA.  Singulair has an unknown safety in pregnancy so she will stop that.  Her other meds are safe.  Ordered an early OB ultrasound to be done in 1 week or after.  Discussed cell free DNA testing and patient would like to have this done at her first OB appointment.  Her 's half-sister does have Down syndrome.  She will check that she is on a max dose of 2000 units of vitamin D3.  She is advanced maternal age at the time of delivery so she will have a level 2 ultrasound at 20 weeks.  We will asked him to pay special attention to the genitalia as she did have the morning after pill but this is unlikely to cause any issues.  Also discussed alpha-fetoprotein at 16 weeks for spina bifida.      Chief Complaint   Patient presents with     Amenorrhea     LMP 08/15/20        HPI: 35 y.o. year old female come in  today for a pregnancy confirmation. LMP was 8-15-20. This would make her 5 2/7 weeks pregnant with EDC 5-22-21.  She is having nausea.  No vomiting.  Spotted for 2 days last week, 4 and 5 days ago.  She thought she was getting her period but did not.  No cramping.  She has taken 3+ pregnancy test.  This is an unplanned pregnancy but they are now excited.  She is not currently on birth control.  She did use the morning after pill and wonders if that would affect the baby.  She had a couple of drinks but not since knowing she is pregnant.    Objective:  LMP 08/15/2020    General: No apparent distress  UPT: Positive        Phone call duration:  21 minutes    Harleen Rivera MD

## 2021-06-11 NOTE — TELEPHONE ENCOUNTER
Called and spoke to patient. She agrees to a phone visit today at 10am. [place] a cabin all week so a video visit would be hard to do. Pt put on schedule.

## 2021-06-13 NOTE — TELEPHONE ENCOUNTER
----- Message from Harleen Rivera MD sent at 12/18/2020  5:13 PM CST -----  Please call patient, urinalysis normal.

## 2021-06-13 NOTE — PROGRESS NOTES
7/21/10 ASCUS, neg HPV  2012, 2013 NIL paps  2016 NIL pap, neg HPV  1/3/18 NIL pap, +HR HPV (not 16/18)  8/23/18 NIL pap, +HR HPV (not 16/18)  8/21/19 NIL pap, +HR HPV (not 16/18)  2/24/20 NIL pap, +HR HPV (not 16/18)  3/12/20 colpo visually normal, no biopsies  11/12/20 NIL pap, +HR HPV (not 16/18) in pregnancy. Plan: cotest in 1 year

## 2021-06-13 NOTE — PATIENT INSTRUCTIONS - HE
Recommend icing 20 minutes 4 times a day for 1 week then ice or heat as needed.    Can wear left knee brace with exercise.    Please let us know if any locking catching instability swelling or redness.    On prnatal vitamin with Iron, Folic acid and taking DHA separately.    Nasal saline or netti pot with sterile water 1-2 times per day.    We will have you not use Flonase but will set prescribed Nasacort aqua 2 sprays each nostril daily.    Okay to continue the Claritin 10 mg daily.    We will get cell free DNA test with sex chromosomes.    We will send you a [employer] message with the genetic testing results except but in the sex of baby in an envelope at the .    We will get a level 2 ultrasound at 20 weeks with being advanced maternal age at time of delivery.  Will have special attention to the genitalia with having taken the morning after pill before knowing you are pregnant but likely will not cause an issue.    We will get alpha-fetoprotein at 16 weeks to screen for spina bifida.    Spouse is thinking about a vasectomy after this delivery.    Set OB appointments every month until 28 weeks, then every 2 weeks until 36 weeks then every week until 42 weeks, to be able to change last 2 visits for baby.    Recommend 3 dairy servings a day or calcium with vitamin D twice a day with food.

## 2021-06-13 NOTE — TELEPHONE ENCOUNTER
Informed patient of provider message below. Patient did get in for a ultrasound for tonight 12/18 at 7pm.

## 2021-06-13 NOTE — PATIENT INSTRUCTIONS - HE
"Follow up: 4 weeks    Recommend icing left knee 20 minutes 4 times a day for 1 week then ice or heat as needed.     Can wear left knee brace with exercise.     Please let us know if any locking catching instability swelling or redness.    We will get a level 2 ultrasound at 20 weeks with being advanced maternal age at time of delivery.  Will have special attention to the genitalia with having taken the morning after pill before knowing you are pregnant but likely will not cause an issue.    When you have the level 2 ultrasound at the perinatologist know about the cousin with hypertrophic cardiomyopathy.    For you we should do an echo at the 5-year daisy which would be next summer.    We will get alpha-fetoprotein at 16 weeks to screen for spina bifida.    Is okay to take magnesium as long as it is under the daily recommended dose of 400 mg but likely try to stay to 100 to 200 mg daily.  We will check a magnesium level today.    For acid reflux, burping or stomach upset, you can take Tums max of 4 in a day if 500 mg tabs, or max of 2000 mg per day, and Pepcid 20 mg twice a day as needed. If that is not helping, Dr. Rivera can prescribe Protonix.     We will monitor position of baby at the end of pregnancy.    Patient Education   HEALTHY PREGNANCY CARE: 14 to 18 WEEKS PREGNANT    During this time, you may start to \"show,\" so that you look pregnant to people around you. You may also notice some changes in your skin, such as an increase in acne on your face. You may notice your heart pounding, a sharp stretching ache on either side of your lower abdomen (round ligament), and more vaginal discharge.     Your baby's nerves and muscles are maturing. Sex organs are recognizable. Your baby is now able to pass urine, and your baby's first stool (meconium) is starting to collect in his or her intestines. Hair is also beginning to grow on your baby's head. Your baby is moving freely inside your uterus but you may not be able to " feel it until 18-20 weeks.    Continue making healthy choices for your baby during pregnancy, including good nutrition, exercise and a safe environment free from smoking, alcohol and drugs.    Your genetic screening with a quad screen blood test may have been done today.    Watch for warning signs and contact your midwife or physician at the clinic with any concerns at [facility] at Phone: [phone]. For example, call about cramping, bleeding, abdominal pain, watery vaginal discharge, or if you are unable to keep fluids down for more than 24 hours due to vomiting.   If it's after clinic hours, physician patients should call the [facility] at [phone] ([phone]); midwife patients should call their answering service at [phone].    How can you care for yourself at home?   You can refer to the Starting Out Right book or find it online at [email or web address] or [email or web address]     You can sign up for a weekly parenting e-mail that gives support, tips and advice from health care professionals that starts with pregnancy and continues through the toddler years. To register, go to [email or web address] at any time during your pregnancy.

## 2021-06-13 NOTE — PROGRESS NOTES
PRENATAL VISIT   FIRST OBSTETRICAL EXAM - OB    Assessment / Impression     Normal first prenatal visit at 12w2d  Discussed orientation, general information, lifestyle, nutrition, exercise,warning signs, resources, lab testing, risk screening and discussed cystic fibrosis screening with patient.  Questions answered.    Plan:          Initial labs drawn.  Prenatal vitamins.  Problem list reviewed and updated.  Genetic screening test options discussed:  Patient elects Cell free DNA test  Role of ultrasound in pregnancy discussed; fetal survey: will order next visit.  Follow up: 4 weeks  Recommend icing 20 minutes 4 times a day for 1 week then ice or heat as needed.    Can wear left knee brace with exercise.    Please let us know if any locking catching instability swelling or redness.    On prnatal vitamin with Iron, Folic acid and taking DHA separately.    Nasal saline or netti pot with sterile water 1-2 times per day.    We will have you not use Flonase but will set prescribed Nasacort aqua 2 sprays each nostril daily.    Okay to continue the Claritin 10 mg daily.    We will get cell free DNA test with sex chromosomes.    We will send you a [employer] message with the genetic testing results except but in the sex of baby in an envelope at the .    We will get a level 2 ultrasound at 20 weeks with being advanced maternal age at time of delivery.  Will have special attention to the genitalia with having taken the morning after pill before knowing you are pregnant but likely will not cause an issue.    We will get alpha-fetoprotein at 16 weeks to screen for spina bifida.    Spouse is thinking about a vasectomy after this delivery.    Set OB appointments every month until 28 weeks, then every 2 weeks until 36 weeks then every week until 42 weeks, to be able to change last 2 visits for baby.    Recommend 3 dairy servings a day or calcium with vitamin D twice a day with food.    Subjective:    Melisa Daily is a  35 y.o.  here today for her First Obstetrical Exam.   S: OVerall nausa and fatigue, worki 3 days ago and then felt chilled and hot, did not take temp.  Fast food for diner.  Vonited once and increased stools.   No blood in stool.  Symptoms lasted 7-10 pm, went to bed and better next day.  No COVID sx or exposure.  His of HPV on paps.  No vaginal bleeding.  Left knee sore in previous and now starting.  No red or warm.  Hurts to kneel.  Her  who is the father of the baby does have a cousin with Down syndrome.  She is interested in the cell free DNA testing with sex chromosomes.  He also will be advanced maternal age at the time of delivery.  No history of gestational hypertension or gestational diabetes.  She has had 2 vaginal deliveries.  She has had normal Paps but since 2018 has tested positive for HPV.  She has had a colposcopy.  History of genital warts for herself or her .  She has been feeling nauseated and fatigued.  No vaginal bleeding.  She did have an early ultrasound.  Other concern today is of sneezing or allergies.  She did go off her Singulair and Flonase.  She is currently on Claritin.  No other concerns.    OB History    Para Term  AB Living   3 2 2 0 0 2   SAB TAB Ectopic Multiple Live Births   0 0 0 0 2      # Outcome Date GA Lbr Ata/2nd Weight Sex Delivery Anes PTL Lv   3 Current            2 Term 18 39w5d 04:14 / 00:29 8 lb 3 oz (3.714 kg) F Vag-Spont Local N ARIADNA   1 Term 16 39w5d 02:19 / 01:47 8 lb 0.4 oz (3.64 kg) F Vag-Spont Inhalation N ARIADNA       Expected Date of Delivery: 2021, by Ultrasound    Past Medical History:   Diagnosis Date     Dysplastic nevi 3/4/2015     Normal vaginal delivery      Postpartum anxiety 2018     Pregnancy 2016     Unstable fetal lie 2018     Unstable lie of fetus 2018     Vaginal delivery      Past Surgical History:   Procedure Laterality Date     PLANTAR'S WART EXCISION  Aug. 2015     WISDOM TOOTH  EXTRACTION       Social History     Tobacco Use     Smoking status: Never Smoker     Smokeless tobacco: Never Used   Substance Use Topics     Alcohol use: Yes     Alcohol/week: 2.5 standard drinks     Types: 3 Standard drinks or equivalent per week     Comment: prior to pregnancy     Drug use: No     Current Outpatient Medications   Medication Sig Dispense Refill     loratadine (CLARITIN) 10 mg tablet Take 10 mg by mouth daily.       omega-3s-dha-epa-fish oil-D3 350 mg- 1,000 unit cap Take by mouth.       prenatal no115-iron-folic acid 29 mg iron- 1 mg Chew Chew daily.       triamcinolone (NASACORT) 55 mcg nasal inhaler 2 sprays each nostril daily. 1 Inhaler 12     No current facility-administered medications for this visit.      Allergies   Allergen Reactions     Ragweed Pollen Other (See Comments)     Runny nose, itchy eyes, sinus congestion, seasonal symptoms.     Hydrocodone-Acetaminophen Nausea And Vomiting             High Risk Behavior: None    Review of Systems  General:  Denies problem  Eyes: Denies problem  Ears/Nose/Throat: Denies problem  Cardiovascular: Denies problem  Respiratory:  Denies problem  Gastrointestinal:  Denies problem, Genitourinary: Denies problem  Musculoskeletal:  Denies problem  Skin: Denies problem  Neurologic: Denies problem  Psychiatric: Denies problem  Endocrine: Denies problem  Heme/Lymphatic: Denies problem   Allergic/Immunologic: Denies problem       Objective:   Objective    Vitals:    11/12/20 1149 11/12/20 1240   BP: (!) 134/96 122/76   Pulse: 97    Resp: 16    Temp: 98.1  F (36.7  C)    TempSrc: Oral    Weight: 198 lb 4 oz (89.9 kg)      Physical Exam:  General Appearance: Alert, cooperative, no distress, appears stated age  Head: Normocephalic, without obvious abnormality, atraumatic  Eyes: PERRL, conjunctiva/corneas clear, EOM's intact  Ears: Normal TM's and external ear canals, both ears  Nose: Nares normal, septum midline,mucosa normal, no drainage  Throat: Lips, mucosa,  and tongue normal; teeth and gums normal  Neck: Supple, symmetrical, trachea midline, no adenopathy;  thyroid: not enlarged, symmetric, no tenderness/mass/nodules; no carotid bruit or JVD  Back: Symmetric, no curvature, ROM normal, no CVA tenderness  Lungs: Clear to auscultation bilaterally, respirations unlabored  Breasts: No breast masses, tenderness, asymmetry, or nipple discharge.  Heart: Regular rate and rhythm, S1 and S2 normal, no murmur, rub, or gallop, Abdomen: Soft, non-tender, bowel sounds active all four quadrants,  no masses, no organomegaly2  Pelvic:Normally developed genitalia with no external lesions or eruptions. Vagina and cervix show no lesions, inflammation, discharge or tenderness. No cystocele, No rectocele. Uterus 12 cm.  No adnexal mass or tenderness.    Extremities: Extremities normal, atraumatic, no cyanosis or edema  Skin: Skin color, texture, turgor normal, no rashes or lesions  Lymph nodes: Cervical, supraclavicular, and axillary nodes normal  Neurologic: Normal     Lab:   Results for orders placed or performed in visit on 11/12/20   Wet Prep, Vaginal    Specimen: Genital   Result Value Ref Range    Yeast Result No yeast seen No yeast seen    Trichomonas No Trichomonas seen No Trichomonas seen    Clue Cells, Wet Prep No Clue cells seen No Clue cells seen   Urinalysis Macroscopic   Result Value Ref Range    Color, UA Yellow Colorless, Yellow, Straw, Light Yellow    Clarity, UA Clear Clear    Glucose, UA Negative Negative    Bilirubin, UA Negative Negative    Ketones, UA Negative Negative    Specific Gravity, UA 1.015 1.005 - 1.030    Blood, UA Negative Negative    pH, UA 7.0 5.0 - 8.0    Protein, UA Negative Negative mg/dL    Urobilinogen, UA 0.2 E.U./dL 0.2 E.U./dL, 1.0 E.U./dL    Nitrite, UA Negative Negative    Leukocytes, UA Negative Negative   Pregnancy (Beta-hCG, Qual), Urine   Result Value Ref Range    Pregnancy Test, Urine Positive (!) Negative   HM1 (CBC with Diff)   Result Value  Ref Range    WBC 10.3 4.0 - 11.0 thou/uL    RBC 4.28 3.80 - 5.40 mill/uL    Hemoglobin 13.2 12.0 - 16.0 g/dL    Hematocrit 39.0 35.0 - 47.0 %    MCV 91 80 - 100 fL    MCH 30.9 27.0 - 34.0 pg    MCHC 33.9 32.0 - 36.0 g/dL    RDW 11.8 11.0 - 14.5 %    Platelets 224 140 - 440 thou/uL    MPV 7.4 7.0 - 10.0 fL    Neutrophils % 74 (H) 50 - 70 %    Lymphocytes % 19 (L) 20 - 40 %    Monocytes % 5 2 - 10 %    Eosinophils % 2 0 - 6 %    Basophils % 1 0 - 2 %    Neutrophils Absolute 7.6 2.0 - 7.7 thou/uL    Lymphocytes Absolute 2.0 0.8 - 4.4 thou/uL    Monocytes Absolute 0.5 0.0 - 0.9 thou/uL    Eosinophils Absolute 0.2 0.0 - 0.4 thou/uL    Basophils Absolute 0.1 0.0 - 0.2 thou/uL

## 2021-06-13 NOTE — PROGRESS NOTES
S:  Patient is doing well. No more morning sickness.  Her first daughter was breech and she was set up twice for version but baby foot back to vertex each time and was born vaginally.  Her second daughter was also breech needed and external cephalic version and then we induced to keep baby in a vertex position.  She wants to know if there is anything she can do to prevent baby from a breech position.  She does have some left knee pain with certain activities.  No redness warmth locking catching or instability.  The same pain happened with her last pregnancy.  Patient has been having some headaches for weeks.  She read that magnesium may help with this and insomnia.  She did start 100 mg of magnesium twice a day and it has helped her sleep and possibly with the headaches.  She wants to ensure this is safe.  Nasacort has helped with her congestion.  No other concerns.     O:  DTR 2 +, see flowsheet    A/P: 35-year-old  3 para 2-0-0-2 female at 60-1/7 weeks gestation.  She will be advanced maternal age at time of delivery.  Follow up: 4 weeks  Recommend icing left knee 20 minutes 4 times a day for 1 week then ice or heat as needed.   Can wear left knee brace with exercise.   Please let us know if any locking catching instability swelling or redness.  We will get a level 2 ultrasound at 20 weeks with being advanced maternal age at time of delivery.  Will have special attention to the genitalia with having taken the morning after pill before knowing you are pregnant but likely will not cause an issue.  When you have the level 2 ultrasound at the perinatologist know about the cousin with hypertrophic cardiomyopathy.  For you we should do an echo at the 5-year daisy which would be next summer.  We will get alpha-fetoprotein at 16 weeks to screen for spina bifida.  Is okay to take magnesium as long as it is under the daily recommended dose of 400 mg but likely try to stay to 100 to 200 mg daily.  We will check a  magnesium level today.  For acid reflux, burping or stomach upset, you can take Tums max of 4 in a day if 500 mg tabs, or max of 2000 mg per day, and Pepcid 20 mg twice a day as needed. If that is not helping, Dr. Rivera can prescribe Protonix.   We will monitor position of baby at the end of pregnancy.

## 2021-06-14 NOTE — PROGRESS NOTES
Assessment:   Pregnancy confirmation  1. Missed menses  Pregnancy (Beta-hCG, Qual), Urine    US OB < 14 Weeks With Transvaginal     Plan:    Counseled patient on early pregnancy issues and plans for continued pregnancy:  - OTC meds safe in early pregnancy,  - importance of prenatal vitamins, and routine activities without restrictions.    - importance of ETOH abstinence and no other drug use  Informed of signs and symptoms of miscarriage and to call with questions of spotting/bleeding management and possible need to come in for evaluation before routine visit at 10-12 weeks.   Set 30 minute visit at 12 weeks pregnant.  Discussed information in the OB packet.  Will let us know if early genetic testing wanted.  Can call for script of Phenergan or Reglan if needed for nausea.  Early ultrasound ordered.  Twenty-five minutes spent with this patient, at least 50% in coordination of care or counseling reguarding the topics discussed in note.    Please schedule an ultrasound.      You may have 1 soda or small caffeinated product per day (about 50 mg of caffeine daily).     Will check Vitamin D at your first OB appointment.      Please schedule a 40 minute in 6 weeks for first OB appointment. You may schedule this appointment before you leave today.   Every 1 month after that until 28 weeks.  Every 2 weeks after that until 36 weeks.  Every 1 week after that until weeks 41 and 42.      Stop taking pro-biotic.      Flu vaccine today.      Will do the Cell Free DNA test.       Chief Complaint   Patient presents with     Amenorrhea     LMP 10/5/17- positive upt at home        HPI: 32 y.o. year old female come in  today for a pregnancy confirmation. LMP was October 5, 2017. This would make her 6 and 3/7 weeks pregnant with EDC July 11, 2018. Not having any spotting. She is having nausea. She had a positive pregnancy test at home on November 15, 2017. She is having some light cramping.     She reports that her depressive symptoms  have improved and she stopped her Zoloft in September.     Family history: FOB half sister (shared dad) has Down's Syndrome, and she is interested in doing the Cell Free DNA test.     Objective:  /76 (Patient Site: Left Arm, Patient Position: Sitting, Cuff Size: Adult Regular)  Pulse 68  Resp 14  Wt 199 lb 6 oz (90.4 kg)  LMP 10/05/2017  BMI 33.18 kg/m2   General: No apparent distress  UPT: Positive      ADDITIONAL HISTORY SUMMARIZED (2): None.  DECISION TO OBTAIN EXTRA INFORMATION (1): None.   RADIOLOGY TESTS (1): Ordered ultrasound.   LABS (1): urinalysis  MEDICINE TESTS (1): None  INDEPENDENT REVIEW (2 each): None.       The visit lasted a total of 30 minutes face to face with the patient. Over 50% of the time was spent counseling and educating the patient about pregnancy.      IMichelle, am scribing for and in the presence of, Dr. Harleen Soto MD.      Harleen LOOMIS MD,  personally performed the services described in this documentation, as scribed by Michelle Hernandez in my presence, and it is both accurate and complete.    Total Data Points:  2

## 2021-06-14 NOTE — PROGRESS NOTES
[facility]  Genetic Counseling Consult    Patient:  Melisa Daily YOB: 1985   Date of Service:  2020      Melisa Daily was seen at the [facility] [place] for genetic consultation as part of her appointment for comprehensive ultrasound.  The indication for genetic counseling is advanced maternal age.       Impression/Plan:   Melisa had a cell-free fetal DNA test earlier in pregnancy, which was normal. The patient had a comprehensive (level II) ultrasound today.  Please see the ultrasound report for further details. The patient declines genetic amniocentesis today.    Pregnancy History:   /Parity:    Age at Delivery: 36 y.o.  ASHLEIGH: 2021, by Ultrasound  Gestational Age: 19w1d    No significant complications or exposures were reported in the current pregnancy.    Pregnancy history:  o 2016: term, , female  o 2018: term, , female       Family History:   A three-generation pedigree was obtained, and is scanned under the  Media  tab.   The following significant findings were reported by Melisa:    Melisa's paternal female cousin  at age 40 due to hypertrophic cardiomyopathy.     Melisa's paternal uncle (her cousin's father) had supra ventricular tachycardia.     Melisa's paternal male first cousin (sibling to female cousin) was also diagnosed with supraventricular tachycardia.    Melisa reported that she had a normal echocardiogram about 5 years and is planning to have repeat screening in the summer.    I reviewed with Melisa that inheritance in familial cases of non-syndromic HCM follows an autosomal dominant pattern. HCM shows variable expressivity and incomplete penetrance. Affected individuals even within one family may show different symptoms, severity, and ages of onset. Rarely, individuals with known mutations never develop clinical features of HCM. We also discussed that knowing if  her cousin had any genetic testing would be helpful. A number of genes are known to be associated with HCM, and these genes can be tested on a panel. Genetic testing detects a mutation in 60-70% of familial cases. We discussed that if a genetic cause for Melisa's cousin's HCM was identified, it would allow genetic testing for other family members, such as Melisa's father. If  Melisa's father did not carry the identified familial mutation, Melisa would not be at risk for familial HCM. Likewise, her children would not be at risk for HCM. Without genetic testing, ACCF/AHA guidelines call for clinical screening for at-risk relatives using echocardiograms starting at age 10.  I encouraged Melisa to review this family history with her child's pediatrician. In the absence of a known familial mutation, at risk family members should still be screened.      Melisa's partner's (Evin) brother was born with clubfeet. We discussed that there is a significant genetic component to the risk for clubfoot. In many families, this condition is passed in an autosomal dominant pattern, with reduced penetrance. The family history suggests that the risk for clubfoot may be higher in Evin and his children, as compared with the population risk.    Evin's paternal half sister has Down syndrome. We reviewed that most cases of Down syndrome are caused by nondisjunction of chromosome 21 and would not increase their risk to have a child with Down syndrome. However, approximately 5% of individuals with Down syndrome can have an unbalanced translocation form of Down syndrome which may carry implications for other family members.    Melisa's mother had a history of multiple pregnancy loss (3 SABs).    Melisa's maternal uncle and his partner had a stillbirth      Otherwise, the reported family history is negative for mental retardation and consanguinity.       Carrier Screening:   The patient reports that she and the father of the pregnancy have   ancestry:     Cystic fibrosis is an autosomal recessive genetic condition that occurs with increased frequency in individuals of  ancestry and carrier screening for this condition is available.  In addition,  screening in the [facility] includes cystic fibrosis. Expanded carrier screening for mutations in a large panel of genes associated with autosomal recessive conditions including cystic fibrosis, spinal muscular atrophy, and others, is now available.The patient has declined the carrier screening options reviewed today.       Risk Assessment for Chromosome Conditions:   We explained that the risk for fetal chromosome abnormalities increases with maternal age. We discussed specific features of common chromosome abnormalities, including Down syndrome, trisomy 13, trisomy 18, and sex chromosome trisomies.      At age 36 at delivery, the midtrimester risk to have a baby with Down syndrome is 1 in 274.     At age 36 at delivery, the midtrimester risk to have a baby with any chromosome abnormality is 1 in 135.     Melisa had aneuploidy screening earlier in pregnancy.    Non-invasive Prenatal Testing (NIPT)    Maternal plasma cell-free DNA testing    Screens for fetal trisomy 21, trisomy 13, trisomy 18, and sex chromosome aneuploidy    Melisa had an Innatal test earlier in pregnancy; we reviewed the results today, which are normal for chromosome 13, chromosome 18 and chromosome 21 (no aneuploidy detected)    Given the accuracy of this test, these results greatly decrease the chance for certain fetal chromosome abnormalities    We discussed the limitations of normal NIPT results       Testing Options:   We discussed the following options:  Comprehensive (Level II) ultrasound: Detailed ultrasound performed between 18-22 weeks gestation to screen for major birth defects and markers for aneuploidy.    Genetic Amniocentesis    Invasive procedure typically performed in the second trimester by which  amniotic fluid is obtained for the purpose of chromosome analysis and/or other prenatal genetic analysis    Diagnostic results; >99% sensitivity for fetal chromosome abnormalities    AFAFP measurement tests for open neural tube defects    We reviewed the benefits and limitations of this testing.  Screening tests provide a risk assessment specific to the pregnancy for certain fetal chromosome abnormalities, but cannot definitively diagnose or exclude a fetal chromosome abnormality.  Follow-up genetic counseling and consideration of diagnostic testing is recommended with any abnormal screening result. Diagnostic tests carry inherent risks- including risk of miscarriage- that require careful consideration.  These tests can detect fetal chromosome abnormalities with greater than 99% certainty.  Results can be compromised by maternal cell contamination or mosaicism, and are limited by the resolution of cytogenetic G-banding technology.  There is no screening nor diagnostic test that can detect all forms of birth defects or mental disability.    It was a pleasure to be involved with Melisa [facility]. Face-to-face time of the meeting was 30 minutes.    Harleen Townsend MS, [facility]  Maternal Fetal Medicine  [facility]  Phone:[phone]  Phone: [phone]  Email: [email or web address]

## 2021-06-14 NOTE — PROGRESS NOTES
"Please see \"Imaging\" tab under Chart Review for full report.  This ultrasound was performed in the [facility], and may be located under Care Everywhere.    Lesia Valerio MD  Maternal Fetal Medicine    " Detail Level: Detailed Quality 130: Documentation Of Current Medications In The Medical Record: Current Medications Documented Quality 226: Preventive Care And Screening: Tobacco Use: Screening And Cessation Intervention: Patient screened for tobacco use and is an ex/non-smoker Quality 431: Preventive Care And Screening: Unhealthy Alcohol Use - Screening: Patient screened for unhealthy alcohol use using a single question and scores less than 2 times per year

## 2021-06-14 NOTE — PROGRESS NOTES
S:  No regualr contrations.  No more bleeding.  Groin pain with movement or changing positions. No vaginal discharge or odor or itching.  Her cousin did die of hypertrophic cardiomyopathy.  They do not know what type it was so genetic testing would likely not be useful for her.  We have discussed doing an echo every 5 years so she would be due for that next year.  She also asked if her children should have echoes.  Her knee pain is better.  First ultrasound showed low-lying placenta but level 2 ultrasound done at maternal-fetal medicine for advanced maternal age showed that that had resolved and she has a normal ultrasound.  Negative alpha-fetoprotein.    O: Deep tendon reflexes 2+, see flowsheet    A/P: 35-year-old  3 para 2-0-0-2 female at 20 2/7 weeks gestation.  She does have round ligament pain.  With your family history of hypertrophic cardiomyopathy your echo would be due this summer and then we can do it every 5 years sooner if needed.  When I see your girls for their well-child check and I can order an echo for each of them and then please remind me that we discussed that I will talk to a pediatric cardiologist to see what other further recommendations are, like should they have genetic testing and or how often should they have an echo.  Covid shot printout given.  Next visit in 4 weeks.  We will do the 1 hour sugar, hemoglobin and syphilis then.  When you check-in please let the  know you are doing a 1 hour sugar and my nurse our lab can get you started.

## 2021-06-14 NOTE — PATIENT INSTRUCTIONS - HE
"With your family history of hypertrophic cardiomyopathy your echo would be due this summer and then we can do it every 5 years sooner if needed.    When I see her girls for their well-child check and I can order an echo for each of them and then please remind me that we discussed that I will talk to a pediatric cardiologist to see what other further recommendations are, like should he have genetic testing and or how often should they had an echo.    Covid shot printout given.    Next visit in 4 weeks.  We will do the 1 hour sugar hemoglobin and syphilis then.  When you check-in please let the  know you are doing a 1 hour sugar and my nurse our lab can get you started.      Patient Education   HEALTHY PREGNANCY CARE: 18-22 WEEKS PREGNANT    Your baby is continuing to develop quickly. At this stage, babies can now suck their thumbs,  firmly with their hands, and are beginning to hear.    Sometime between 18 and 22 weeks, you will start to feel your baby move. At first, these small fetal movements feel like fluttering or \"butterflies.\" Some women say that they feel like gas bubbles. As the baby grows, these movements will become stronger and be able to be felt through your abdomen.     Nutrition: During this time, you may find that your nausea and fatigue are gone. Overall, you may feel better and have more energy than you did in your first trimester. Be sure you are getting enough calcium and iron in your diet. Your prenatal vitamins cannot supply all of the nutrients you need, so continue to eat 3-4 servings of dairy foods and 2-3 servings of meat/fish/poultry/nuts every day. Foods high in iron include: red meats, eggs, dark green vegetables, dark yellow vegetables, nuts, kidney beans and chickpeas. Some cereals are fortified with iron, so look at the food labels for 100% of the daily requirement for iron.     [place] for childbirth and parenting classes, including an infant CPR class. Breastfeeding " classes are recommended too.    Plan for the gestational diabetes screening between weeks 24-28. You can eat normally before that visit; we would suggest making sure you have protein foods, but not a lot of carbohydrates or sugary foods.    Your blood type was determined at your first OB appointment. If you are Rh negative, you should discuss the need for a Rhogam shot with your midwife or physician.     If you had a  birth in the past, discuss a trial of labor with your midwife or physician. He or she may ask that you obtain your operative report from that  if you are wanting to have a vaginal birth after  () this time.     Think about the support you have, and what help you can plan on from family and friends, after your baby is born. Many mothers and babies are ready to go home from the hospital within a few days. Your clinic staff is available to assist you and the [facility] staff is available to you after hours. Start preparing your other children for changes they'll experience with the new baby. Explore day care options.    You may find that you have new discomforts now, such as sleep problems or leg cramps. To access information that can help you ease these discomforts, you can refer to the Starting Out Right book or find it online at [email or web address] or [email or web address]    You can sign up for a weekly parenting e-mail that gives support, tips and advice from health care professionals that starts with pregnancy and continues through the toddler years. To register, go to [email or web address] at any time during your pregnancy.    Watch for the warning signs of premature labor:     Dull, low backache    Contractions of the uterus, menstrual-like cramps    Abdominal cramping with or without diarrhea    More  pelvic pressure    Increase or change in vaginal discharge.     Remember that labor doesn't have to hurt. Never hesitate to call your midwife or physician or their staff at [facility] at Phone: [phone] for any one of these warning signs - or if something just doesn't feel right. If it's after clinic hours, physician patients should call the [facility] at [phone]); midwife patients should call their answering service at [phone].

## 2021-06-15 NOTE — PROGRESS NOTES
PRENATAL VISIT   FIRST OBSTETRICAL EXAM - OB    Assessment / Impression       Normal first prenatal visit at 11w6d  Discussed orientation, general information, lifestyle, nutrition, exercise,warning signs, resources, lab testing, risk screening and discussed cystic fibrosis screening with patient.  Questions answered.    Plan:      Initial labs drawn.  Prenatal vitamins.  Problem list reviewed and updated.  Genetic screening test options discussed:  Patient elects Cell free DNA test  Role of ultrasound in pregnancy discussed; fetal survey: requested.  Follow up: 4 weeks  Re-check urine at follow-up.   See dermatology if due.   Follow up with us in 4 weeks We will do AFP blood test at this visit.   Labs as ordered with cell free DNA and vit. D.  Sex of baby will be in an envelope at .  Will recheck UA next visit.      Subjective:    Melisa Daily is a 32 y.o.  here today for her First Obstetrical Exam. Melisa has been feeling nauseous constantly. She has had two ultrasounds; the first ultrasound was too early to confirm pregnancy. She believes that she will be 12 weeks pregnant tomorrow. She would like to have the sex of the baby left in an envelope for her to . She wonders when she should start sleeping on her side rather than her back. She wonders about folic acid in the prenatal vitamin and continuing to take it in the third semester. She read that the folic acid can cause the baby to have allergies. She had difficulty breast feeding with her first child and wonders if there is anything she can do to improve her chances of successful breast feeding with this baby. She mentions that her first baby was breech for a large portion of her pregnancy and wonders if there is anything she should do during this pregnancy to prevent this.      OB History    Para Term  AB Living   2 1 1 0 0 1   SAB TAB Ectopic Multiple Live Births   0 0 0 0 1      # Outcome Date GA Lbr Ata/2nd Weight Sex  Delivery Anes PTL Lv   2 Current            1 Term 07/27/16 39w5d 02:19 / 01:47 8 lb 0.4 oz (3.64 kg) F Vag-Spont Inhalation N ARIADNA          Expected Date of Delivery: 7/19/2018, by Ultrasound    Past Medical History:   Diagnosis Date     Dysplastic nevi 3/4/2015     Hyperlipidemia      Pregnancy 2/17/2016     Pregnant 7/27/2016     Vaginal delivery      Past Surgical History:   Procedure Laterality Date     PLANTAR'S WART EXCISION  Aug. 2015     wart removal       WISDOM TOOTH EXTRACTION       WISDOM TOOTH EXTRACTION       Social History   Substance Use Topics     Smoking status: Never Smoker     Smokeless tobacco: Never Used     Alcohol use 1.5 oz/week     3 Standard drinks or equivalent per week      Comment: prior to pregnancy     Current Outpatient Prescriptions   Medication Sig Dispense Refill     budesonide (RINOCORT AQUA) 32 mcg/actuation nasal spray SHAKE WELL AND USE 2 SPRAYS IN EACH NOSTRIL DAILY 8.6 mL 3     CHOLECALCIFEROL, VITAMIN D3, (VITAMIN D3 ORAL) Take 2,000 Units by mouth.       fexofenadine (ALLEGRA ALLERGY) 60 MG tablet Take 60 mg by mouth daily.       OMEGA-3/DHA/EPA/FISH OIL (FISH OIL-OMEGA-3 FATTY ACIDS) 300-1,000 mg capsule Take 1 g by mouth daily.       prenatal vitamin iron-folic acid 27mg-0.8mg (PRENATAL S) 27 mg iron- 800 mcg Tab tablet Take 1 tablet by mouth daily.       No current facility-administered medications for this visit.      Allergies   Allergen Reactions     Ragweed Pollen Other (See Comments)     Runny nose, itchy eyes, sinus congestion, seasonal symptoms.     Hydrocodone-Acetaminophen Nausea And Vomiting             High Risk Behavior: None    Review of Systems  General:  Denies problem  Eyes: Denies problem  Ears/Nose/Throat: Denies problem  Cardiovascular: Denies problem  Respiratory:  Denies problem  Gastrointestinal:  Denies problem, Genitourinary: Denies problem  Musculoskeletal:  Denies problem  Skin: Denies problem  Neurologic: Denies problem  Psychiatric: Denies  problem  Endocrine: Denies problem  Heme/Lymphatic: Denies problem   Allergic/Immunologic: Denies problem       Objective:   Objective    Vitals:    01/03/18 0909   BP: 116/68   Pulse: 70   Resp: 14   Temp: 98.2  F (36.8  C)   TempSrc: Oral   Weight: 203 lb 2 oz (92.1 kg)     Physical Exam:  General Appearance: Alert, cooperative, no distress, appears stated age  Head: Normocephalic, without obvious abnormality, atraumatic  Eyes: PERRL, conjunctiva/corneas clear, EOM's intact  Ears: Normal TM's and external ear canals, both ears  Nose: Nares normal, septum midline,mucosa normal, no drainage  Throat: Lips, mucosa, and tongue normal; teeth and gums normal  Neck: Supple, symmetrical, trachea midline, no adenopathy;  thyroid: not enlarged, symmetric, no tenderness/mass/nodules; no carotid bruit or JVD  Back: Symmetric, no curvature, ROM normal, no CVA tenderness  Lungs: Clear to auscultation bilaterally, respirations unlabored  Breasts: No breast masses, tenderness, asymmetry, or nipple discharge.  Heart: Regular rate and rhythm, S1 and S2 normal, no murmur, rub, or gallop, Abdomen: Soft, non-tender, bowel sounds active all four quadrants,  no masses, no organomegaly  Pelvic:Normally developed genitalia with no external lesions or eruptions. Vagina and cervix show no lesions, inflammation, discharge or tenderness. No cystocele, No rectocele. Uterus 12 cm.  No adnexal mass or tenderness.      Extremities: Extremities normal, atraumatic, no cyanosis or edema  Skin: Skin color, texture, turgor normal, no rashes or lesions  Lymph nodes: Cervical, supraclavicular, and axillary nodes normal  Neurologic: Normal     Lab: Results for orders placed or performed in visit on 01/03/18   Urinalysis Macroscopic   Result Value Ref Range    Color, UA Yellow Colorless, Yellow, Straw, Light Yellow    Clarity, UA Clear Clear    Glucose, UA Negative Negative    Bilirubin, UA Negative Negative    Ketones, UA Negative Negative    Specific  Gravity, UA 1.020 1.005 - 1.030    Blood, UA Negative Negative    pH, UA 7.0 5.0 - 8.0    Protein, UA 30 mg/dL (!) Negative mg/dL    Urobilinogen, UA 0.2 E.U./dL 0.2 E.U./dL, 1.0 E.U./dL    Nitrite, UA Negative Negative    Leukocytes, UA Negative Negative      ADDITIONAL HISTORY SUMMARIZED (2): None.  DECISION TO OBTAIN EXTRA INFORMATION (1): None.   RADIOLOGY TESTS (1): None.  LABS (1): Ordered UA today.   MEDICINE TESTS (1): None.  INDEPENDENT REVIEW (2 each): None.   TOTAL DATA POINTS: 1    The visit lasted a total of 39 minutes face to face with the patient. Over 50% of the time was spent counseling and educating the patient about her pregnancy.    I, Taylor Butcher, am scribing for and in the presence of, Dr. Rivera.    I, Dr. Harleen Rivera MD, personally performed the services described in this documentation, as scribed by Taylor Butcher in my presence, and it is both accurate and complete.

## 2021-06-15 NOTE — PROGRESS NOTES
She is 16 weeks along today. A urinalysis on 1/3/18 showed 30 mg/dL of protein and is rechecked today; negative for protein today. She has been feeling tired. She has been taking OTC vitamin D, 5000 IU daily and wonders if she should continue taking it. Her vitamin D level was 32.4 on 1/3/18. She was reaching for her older daughter and strained her back the other week. She saw her chiropractor afterwards and was told that she should be taking magnesium. She started taking magnesium oxide per their recommendations. She thought she was feeling fetal movement a few days ago. The feeling was present for two days. She describes the sensation as a movement, more than a typical flutter. Her [facility] labs were run and she found out they are having a baby girl. An early US was completed to determine the due date, but it was too early. A second US was repeated on 12/07/17 and gave an ECD of 7/23/18. She was non-diabetic with her first pregnancy and was not hypertensive. Her  has never been diagnosed with spina bifida or a spinal nerve defect. She has been hearing about placenta encapsulation and wonders if this is something she can do. She experienced postpartum after she delivered her first child and would like to try encapsulation with the second. She received the influenza vaccine this season.     Positive HPV: She does not have a history of previous abnormal PAP's. Her most recent PAP on 1/03/18 was normal but the HPV cascade was positive for other HR HPV. Neither she or her  have had genital warts.       ROS: She uses the Rhinocore as needed but has not been taking it now. She also takes Allegra as needed.       Plan:  Continue the vitamin D, 5000 IU daily.   Please discontinue magnesium supplement, and check your prenatal vitamin.   Alpha-fetoprotein will be checked today.   20 week US ordered today. Schedule at 20 weeks. #[phone]  Recheck PAP postpartum for positive HPV on 1/3/18.    Follow up every  4 weeks until 28 weeks, then every 2 weeks until 36 weeks, and every week thereafter.     Data:   Ordered UA macro lab today. Reviewed HPV and GYN PAP from 1/3/18.   Ordered 20 wk US today.   Total data points: 2    The visit lasted a total of 23 minutes face to face with the patient. Over 50% of the time was spent counseling and educating the patient about routine OB.  I, Stephanie Black, am scribing for and in the presence of, Dr. Harleen Rivera.  I, Dr. Harleen Rivera MD, personally performed the services described in this documentation, as scribed by Stephanie Black in my presence, and it is both accurate and complete.

## 2021-06-15 NOTE — PROGRESS NOTES
S:  Last week has tiny bit of blood in the urine 2 times.  Now with mild vaginal pressure.  No regular contractions.  No blood from vaginal fluid leakage. Some clear discharge. No vaginal odor or itching.    O: DTR 2+, see flowsheet    A/P: 35-year-old  3 para 2-0-0-2 female at 24-2/7 weeks gestation. O+ blood type.  Next visit in 4 weeks.  Cervical check then.  Tdap shot next visit.  Starting at the 30-week visit we will be checking a urine sample each visit.  Starting at the 34-week visit we will start checking the cervix again.  Today we will do the 1 hour glucose, hemoglobin, syphilis, urinalysis and urine culture.  Since you will be advanced maternal age at time of delivery we will get an ultrasound with biophysical profile at 36 weeks and starting at 36 weeks we will do a nonstress test monitoring at our clinic weekly.

## 2021-06-15 NOTE — PROGRESS NOTES
S:  No more blood in urine.  OVerall doing well.  She is wondering if she could see a lactation consultant during her pregnancy to do anything that would help her be more successful with breast-feeding.  She had difficulty breast-feeding both of her daughters.    O:  DTR 2+    A/P: 35-year-old  3 para 2-0-0-2 female at 28-2/7 weeks gestation.  O+ blood type.  She will be advanced maternal age at time of delivery.  Since you will be advanced maternal age at time of delivery we will get an ultrasound with biophysical profile at 36 weeks and starting at 36 weeks we will do a nonstress test monitoring at our clinic weekly.  Tdap shot today.  Starting next visit we will have a urine sample each visit.  Follow-up in 2 weeks.  Given referral to outpatient lactation.  Hopefully they call you but if you do not give me number for the [facility] at [phone].  Can preregister anytime online at [email or web address].

## 2021-06-16 PROBLEM — Z87.59 HISTORY OF LACTATION DISORDER: Status: ACTIVE | Noted: 2021-03-23

## 2021-06-16 PROBLEM — Z91.09 ENVIRONMENTAL ALLERGIES: Status: ACTIVE | Noted: 2019-05-13

## 2021-06-16 PROBLEM — R87.810 CERVICAL HIGH RISK HPV (HUMAN PAPILLOMAVIRUS) TEST POSITIVE: Status: ACTIVE | Noted: 2018-01-03

## 2021-06-16 PROBLEM — O09.523 MULTIGRAVIDA OF ADVANCED MATERNAL AGE IN THIRD TRIMESTER: Status: ACTIVE | Noted: 2020-12-09

## 2021-06-16 PROBLEM — O44.40 LOW-LYING PLACENTA: Status: ACTIVE | Noted: 2020-12-19

## 2021-06-16 PROBLEM — Z34.90 PREGNANT: Status: ACTIVE | Noted: 2021-05-20

## 2021-06-16 PROBLEM — O32.0XX0 UNSTABLE FETAL LIE, SINGLE OR UNSPECIFIED FETUS: Status: ACTIVE | Noted: 2021-05-13

## 2021-06-16 NOTE — TELEPHONE ENCOUNTER
I called the insurance company.  These are not being covered under the global OB package because they were not routine screenings.  Since they are not routine, it gets put under her general medical coverage, which is 80% after meeting the deductible.     So you believe these should be routine screening?    Please review the dx for each.  Thank you

## 2021-06-16 NOTE — TELEPHONE ENCOUNTER
I filled out her medical leave for maternity leave forms.  I will give them to her at her OB visit this week.  We will make a copy for the chart.

## 2021-06-16 NOTE — PATIENT INSTRUCTIONS - HE
I will do your medical leave forms and give them to your next visit.    Recommend 64-96 oz of non-caffenated fluid per day.    Urinalysis every visit.    Next visit will be the hemoglobin and group B strep.    Next visit we will start nonstress tests and do that each visit.    Biophyscial profile ultrasound with estimated fetal weight to be done around 36 weeks. with you being advanced maternal age at the time of delivery.    Monitor the right arm skin lesion.  If it changes size shape color or bleeds we want you to see dermatology now otherwise you can see them after delivery.      Patient Education   HEALTHY PREGNANCY CARE: 34-36 WEEKS PREGNANT    Your baby is gaining about an ounce each day, so healthy nutrition and rest are still very important. Learn about late pregnancy symptoms, such as constipation and backaches. Drinking more fluids and eating more fiber can relieve constipation. The pelvic tilt and a heating pad can ease backaches.    Continue to watch for signs and symptoms of preeclampsia:     Sudden swelling of your face, hands, or feet     New vision problems such as blurring, double vision, or flashing lights    A severe headache not relieved with acetaminophen (Tylenol)    Sharp or stabbing pain in your right or middle upper abdomen    You're almost done with your pregnancy but it's still too soon to have your baby. The goal is to have your baby after 37 weeks, so watch for signs and symptoms of premature labor:     Regular contractions. This means having about 6 or more within 1 hour, even after you have had a glass of water and are resting.     A backache that starts and stops regularly.    An increase or change in vaginal discharge, such as heavy, mucus-like, watery, or bloody discharge.     Your water breaks or leaks.    You will be tested for group B streptococcus (GBS), a type of bacteria found in 10-30% of pregnant women. A woman with GBS can pass it to her baby during delivery. Most babies  "who get GBS from their mothers do not have any problems, but some babies get very ill and need to be hospitalized for antibiotic treatment. Treating you with antibiotics during labor and delivery helps to prevent infection in your baby.    Review information on postpartum care that you will need after the baby is born.  Discuss your choices and plans for birth control with your midwife or physician.     Postpartum Care  During the days and weeks after the delivery of your baby (postpartum period), your body will change as it returns to its nonpregnant condition. As with pregnancy changes, postpartum changes are different for every woman.    Physical changes after childbirth  The changes in your body may include:    Contractions called afterpains shrink the uterus for several days after childbirth. Shrinking of the uterus to its prepregnancy size may take 6 to 8 weeks.    Sore muscles (especially in the arms, neck, or jaw) are common after childbirth. This is because of the hard work of labor and pushing your baby out. The soreness should go away in a few days.    Bleeding and vaginal discharge (lochia) may last for 2 to 8 weeks, and can come and go for about 2 months.    Vaginal soreness, including pain, discomfort, and numbness, is common after vaginal birth. Soreness may be worse if you had a perineal tear or episiotomy.    If you had a  birth (), you may have pain in your lower abdomen and may need pain medicine for 1 to 2 weeks.    Breast engorgement is common around the 3rd or 4th day after delivery, when the breasts begin to fill with milk. This can cause discomfort and swelling. If you are breast feeding, the best treatment is to feed your baby often or pump the milk out. You can also use warm compresses. If you are not breast feeding, placing ice packs on your breasts, taking a hot shower, or using warm compresses may relieve the discomfort.    Be aware of postpartum depression    \"Baby " "blues\" are common for the first 1 to 2 weeks after birth. You may cry or feel sad or irritable for no reason.     For some women, these feelings last longer and are more intense. This is called postpartum depression.     If your symptoms last for more than a few weeks or you feel very depressed, ask your midwife or physician for help.     Postpartum depression can be treated. Support groups and counseling can help, but sometimes medication is needed.     Discuss follow-up appointments with your midwife or physician, as well. He or she will usually want to see you 6 weeks after the birth of your baby, sooner if you are having problems.    If you have questions about any symptoms you are experiencing or any other concerns, call your provider or their clinic staff at [facility] at Phone: [phone]. If it's after clinic hours, physician patients should call the [facility] Connection at [phone] ([phone]); midwife patients should call their answering service at [phone].    How can you care for yourself at home?   You can refer to the Starting Out Right book or find it online at [email or web address]/images/[email or web address]://[email or web address]/[email or web address]/[email or web address]-[email or web address]-Right.pdf or http://www.[facility].org/images/stories/flipbooks/[facility]-starting-out-right/[facility]-starting-out-right.html#p=8     You can sign up for a weekly parenting e-mail that gives support, tips and advice from health care professionals that starts with pregnancy and continues through the toddler years. To register, go to [email or web address] at any time during your pregnancy.    Making Early Breastfeeding or Chestfeeding Work: What's Important?  Breastfeeding/chestfeeding is important!     It helps keep babies healthy.    Parents who breastfeed/chestfeed have lower risks of breast and ovarian cancer.    It's convenient: the milk is always ready and warm, and there is nothing to mix or " "prepare for feeding.    Formula is harder for your baby to digest.    It helps you bond with your baby and protects against postpartum depression.  Lots of early skin-to-skin contact with your baby    Place your naked baby with baby's belly against your bare chest. Cover baby's back with a blanket    Start skin-to-skin right after birth, as soon as you are ready    Skin-to-skin:  ? Helps keep baby warm  ? Improves baby's oxygen and blood sugar levels  ? Helps your uterus contract and bleed less  ? Helps baby feel calm and comforted  ? Helps you feel close to baby  ? Helps get breastfeeding started. Being close makes latching on easier, and baby may move over to the nipple and latch without help  ? Baby breastfeeds better and longer when skin-to-skin  Placing baby well for good attachment to the breast or chest    Hold your baby close with baby's tummy touching your tummy.    Wait for baby to open mouth wide, then bring baby onto the breast/chest.    Baby should take a big mouthful of breast/chest, not just the nipple. This helps baby get more milk, and the suckling should feel comfortable.    When baby is latched well to the breast/chest, nipples aren't cracked or painful.  Keeping baby near (called \"rooming-in\" at the hospital)    Baby sleeps better and cries less when birth parent is near. Your room is quiet.    We will place your baby safely on their back in their bassinette. This lets you practice safe sleep for your baby while keeping them at your bedside.    Baby feeds more often, which means your milk supply increases faster, and your baby loses less weight.    Parents have an easier time getting to know and bonding with baby.    Parents feel much more confident about baby care and breastfeeding/chestfeeding.    Maternity staff can help at any time.  Feeding on cue    Feeding on cue simply means feeding whenever your baby shows signs of hunger    Crying is a late hunger sign. Feed baby whenever baby wants for " "as long as baby wants.    Feeding signs: mouth movements, sticking the tongue out, rooting (baby turns toward chest and may open mouth), hand-to-mouth movements    Advantages of feeding on cue:  ? Frequent breastfeeding/chestfeeding in the first weeks after birth gives you a good milk supply for months to come.  ? Babies settle into a relaxing feed more quickly. Babies enjoy feedings more when they don't have to cry to be fed.  ? Feeding is comfort as well as nutrition. Newborns love constant closeness and feeding and can't be held \"too much\" or \"spoiled.\"  ? Newborns need small frequent feedings in the first days of life. Just one to three teaspoons fill a new baby's stomach.  ? Responding to feeding cues helps babies gain weight.  Feeding only human milk in the first six months    Colostrum is the first milk that baby gets at birth. The amount of colostrum matches the baby's stomach, so it will not be overfilled.    The small volumes ready at birth are also easier for baby to handle.    All babies lose weight in the first few days. This is simply \"water weight.\"    Introducing food or fluids other than human milk too early can cause problems for breastfeeding/chestfeeding and for your baby's health.    Feeding only human milk maximizes the protection against disease and infections.    Your body knows how much milk to make by how often your baby feeds. If you give your baby formula, your body may not know how much milk to make.    For informational purposes only. Not to replace the advice of your health care provider. Adapted with permission from \"Getting Started with Infant Care and Breastfeeding,\" by JANAY Trent, WILFREDO, Dasha Esteves, RN, IBCLC, Lary Bautista MD, WILFREDO, and Blessing Durham MD, IBCLC. [place] [employer], 2017. Clinically reviewed by [facility]. [employer] 011234 - 05/19.        [place] Breastfeeding Resources       Research shows that human milk offers the " best  nutrition and protection for babies. So at [facility],  we care for families and babies in a way that  promotes, teaches and supports lactation. We  support all breastfeeding, chestfeeding and human  milk feeding families, as well as families who can t  breastfeed / chestfeed or who choose not to do so.  A mother or caregiver s own milk is best for a baby,  but if you are unable to breastfeed / chestfeed, we  may suggest pasteurized donor human milk for your  baby while in the hospital.    Lactation support    The following [facility] locations offer  individual outpatient lactation consults. Some  locations offer phone or group lactation consults  with certified lactation consultants. Call to confirm  services and for information and appointments. You  may wish to call your insurance company first to see  if they will cover the cost of a consult.    [facility]: [phone]    [facility]: [phone]    [facility]: [phone]  Offers [facility] First Days program, which includes  group lactation visits and one free information session  about delivering your baby at a designated [facility] hospital and the importance and benefits  of breastfeeding and human milk. These group visits  also help patients with feeding concerns and offer  information about other postpartum topics.                For informational purposes only. Not to replace the advice of your health care  provider. Copyright   2005 [facility]. All rights reserved. [employer] 184448 - REV 01/19.   [facility] ([place]):  [phone]    [facility] ([place]):  [phone] or [phone]    [facility] ([place]):  [phone]    [facility]  ([place]): [phone]    [facility] ([place]):  [phone] or [phone]  Includes follow-up visits for  caregivers of babies  discharged from the  intensive care unit  (NICU) at [facility].    [facility] ([place]):  [phone]    [facility] ([facility],  [facility], primary care clinics):  [phone]  Also offers weight checks, feeding discussions and support with a lactation consultant as a part of free, weekly support group.    [facility]: [phone]  Also offers a free weekly support group.                                                                                                                                                                                                      (continued)   You may also call [facility] at [phone]  for information about [facility] and [facility]  locations that offer lactation support. For information  about breastfeeding / chestfeeding and childbirth  classes in the [facility], go to [facility] at www.[email or web address]. For [facility], go to [email or web address] and click on  Classes and Events at the bottom of the page. Or, call  [phone].    Supplies    You can get breast pumps from the Birthplace nurses  at [facility] or [facility]  nurses at [facility]. Call your health  insurance to see if they will cover the cost of the  pump. Tell your nurse you d like a pump. They will  help you fill out the right paperwork. The pump will  be ready for you when you leave the hospital.    If you decide to get a pump after you leave the  hospital, you can get one from our partners at  [facility]. [facility] carries a range of  feeding supplies and pumps. Please call your health  insurance to see if they will cover the cost of the  pump. Then call [facility]  to find out what supplies and pumps are available.  Some stores may deliver the pump to your home.    [facility]:  [email or web address] Other lactation services    [facility]: [phone] (24 hours a day)  www.[email or web address]  Offers support for breastfeeding / chestfeeding and  human milk feeding families. Call to find a group in  your area.    National [facility]:  [phone]  www.womenshealth.gov/breastfeeding  Offers a breastfeeding / chestfeeding information  line in English and Upper sorbian.    WIC (Women, Infants and Children) Program:  [phone]  Offers breastfeeding / chestfeeding counseling. Call  to find an office near you.    Milk banking    [facility]  [email or web address]  [phone]  Consider freezing your extra collected milk to donate  to babies in need. Email or call for information.                           If you are deaf or hard of hearing, please let us know. We provide many free services including  sign language interpreters, oral interpreters, TTYs, telephone amplifiers, note takers and written materials.

## 2021-06-16 NOTE — PROGRESS NOTES
Chief Complaint   Patient presents with     Nasal Congestion     poss sinus infection, 6 days     Headache     6 days     Cough     6 days         HPI    Patient is here for 6 days of cough, with yellowish nasal discharge and congestion, and mild frontal sinus pressure. NO objective fever, chills, chest pain, shortness of breath. She took OTC plain Robitussin with some relief of the cough. She is 20 wks pregnant.    ROS: Pertinent ROS noted in HPI.     Allergies   Allergen Reactions     Ragweed Pollen Other (See Comments)     Runny nose, itchy eyes, sinus congestion, seasonal symptoms.     Hydrocodone-Acetaminophen Nausea And Vomiting       Patient Active Problem List   Diagnosis     Hyperlipidemia     Allergies     Family history of hypertrophic cardiomyopathy     Fetal cardiac arrhythmia     LGA (large for gestational age) fetus     Polyhydramnios     Post partum depression     Pregnancy       Family History   Problem Relation Age of Onset     Hypertension Mother      Stroke Mother      Asthma Mother      Depression Mother      Coronary artery disease Mother      Depression Father      Coronary artery disease Father      Hypertension Father      Diabetes type II Maternal Grandmother      Stroke Paternal Grandmother        Social History     Social History     Marital status:      Spouse name: N/A     Number of children: N/A     Years of education: N/A     Occupational History           Social History Main Topics     Smoking status: Never Smoker     Smokeless tobacco: Never Used     Alcohol use 1.5 oz/week     3 Standard drinks or equivalent per week      Comment: prior to pregnancy     Drug use: No     Sexual activity: Yes     Partners: Male     Other Topics Concern     Not on file     Social History Narrative         Objective:    Vitals:    03/06/18 1748   BP: 130/66   Pulse: 77   Resp: 16   Temp: 98.1  F (36.7  C)   SpO2: 99%       Gen:NAD  Oropharynx: clear without lesions  Ears: TMs normal  without fluids, ear canals normal with small cerumen  Nose: no discharge, congested nasal  Mucosa  Sinus: mild tenderness to percussion of frontal sinuses bilaterally  Neck:NAD  CV: RRR, normal S1S2, no M, R, G  Pulm: CTAB, normal effort    Impression:    VIral URI w/ cough and congestion    Plan:    OTC plain Robitussin prn  OTC Afrin for no more than 2-3 days.  Fluids.  F/u with PCP if no improvement in 5-7 vasquez.

## 2021-06-16 NOTE — TELEPHONE ENCOUNTER
20 to ask you to do this but can you check with either billing or radiology to see what codes would be covered?  Thank you so much.

## 2021-06-16 NOTE — TELEPHONE ENCOUNTER
I spoke to our billing dept and from our end pt does not have any unpaid balance.    I spoke to the pt and she said that the one from 9/29 is a bill from [facility] for 171.71. She said they were going to submit it for coding review but now it is in collections. I spoke to [place] [facility]'s billing dept and they looked into it and did not see any rejection on it for coding issues. They said the balance of 171.71 was pt responsibility being applied to deductible. I asked the pt to follow up with her insurance to see if she had a deductible that needed to be met.     The one from 12/18 was done at [facility] [place]note 2] so I told pt she will need to check with them on the billing for that as we did not bill for that testing here

## 2021-06-16 NOTE — TELEPHONE ENCOUNTER
Ok after endless digging on this, the issue is that the diagnosis you linked with this US on 9/29 is not preventative. It is listed as missed menses and not supervision of pregnancy. I am going to prep a letter for you to review that we can have the patient submit to her insurance with the correct diagnosis. Please review letter and sign.

## 2021-06-16 NOTE — TELEPHONE ENCOUNTER
"The only thing that would need to be changed is if you think these should have been \"routine\" screenings and not the diagnosis that they had on them. Coding cannot change them. If they were not routine and were done because of a concern that we were looking at then they are not covered 100%. If you feel they were routine screenings and want to change the diagnosis let me know cause I believe there is a form that you have to fill out to correct the diagnosis.   "

## 2021-06-16 NOTE — PROGRESS NOTES
S: No frequent contractions.  Saw lactation consult.  Had first Covid shot .  She did register with the [employer] safe registry.  She is asking my opinion on placental encapsulation and cord blood collection.  She would like a prescription for breast pump.    O: DTR 2+, UA negative, see flowsheet    A/P: 35-year-old  3 para 2-0-0-2 female at 32-2/7 weeks gestation.  O+ blood type.  Advanced maternal age at time of delivery.  We will check hemoglobin at 34 weeks and 38 weeks.  Continue your prenatal vitamin with iron and high iron diet.  If the hemoglobin goes under 11 we will start an iron supplement.  Saw outpatient lactation prenatally.  Since you will be advanced maternal age at time of delivery we will get an ultrasound with biophysical profile at 36 weeks and starting at 36 weeks we will do a nonstress test monitoring at our clinic weekly.  Printed prescription for double electric breast pump.  You can bring this to her medical supply showroom at the [facility] [[email or web address]  Up to you about cord blood donation/storage and placenta encapsulation.  Next visit will start cervical checks.

## 2021-06-16 NOTE — TELEPHONE ENCOUNTER
Reason for Call:  Other billing     Detailed comments: pt calling as she has called billing & insurance but isn't getting anywhere.  Pt needs provider to re-code ultrasounds from 9/29/20 & 12/18/20 as screening diagnosis.  The insurance states the order from provider is different from procedure completed    Pt states she is now being sent to collections for something that should be covered under OB benefits    Phone Number Patient can be reached at: Home number on file [phone] (home)    Best Time: na    Can we leave a detailed message on this number?: Yes    Call taken on 4/2/2021 at 3:01 PM by Carissa Velazquez

## 2021-06-16 NOTE — PATIENT INSTRUCTIONS - HE
We will check hemoglobin at 34 weeks and 38 weeks.    Continue your prenatal vitamin with iron and high iron diet.  If the hemoglobin goes under 11 we will start an iron supplement.    Saw outpatient lactation prenatally.    Since you will be advanced maternal age at time of delivery we will get an ultrasound with biophysical profile at 36 weeks and starting at 36 weeks we will do a nonstress test monitoring at our clinic weekly.    Printed prescription for double electric breast pump.  You can bring this to her medical supply showroom at the [facility].    Up to you about cord blood donation/storage and placenta encapsulation.    Next visit will start cervical checks.    Patient Education   HEALTHY PREGNANCY CARE: 30-34 WEEKS PREGNANT    You have made it to the final months of your pregnancy. By now, your baby is starting to fill out with some fat under his skin, fuzzy hair on his shoulders, and is gaining 4 to 6 ounces per week.    Discuss any travel plans with your midwife or physician.    Review possible changes in sexuality during later pregnancy and discuss these with your midwife or physician, as well as your partner. Alternative love-making positions may be more comfortable.    Discuss labor and delivery issues with your midwife or physician. If you had a  birth in the past, discuss a trial of labor with your midwife or physician. He or she may ask that you sign a consent form, if you wish to have a vaginal birth after  (). Ask your midwife or physician to explain your options for managing pain during your labor and delivery. Sometimes, during the birth process, an episiotomy may need to be cut in the vagina to make the opening bigger or let the baby come out quicker. You may want to discuss the episiotomy and how often it is needed with your midwife or physician.    Plan for your baby's care by selecting a child health care provider (Family physician, Pediatrician, or  Pediatric Nurse Practitioner). Practice installing an infant car seat correctly in the car. Ask for car seat information as needed and make sure it is safe and will work in the car your baby will ride in. You will need a car seat to bring your baby home from the hospital. Check the procedure for adding your baby to your health care plan. Review your decision about circumcision and ask for any information you need. As you buy and receive items for your baby, don't put a baby walker on your list. Walkers can be dangerous and can cause serious injury to your child. A safer option is a saucer-type play station, since it doesn't allow baby to travel across the floor.    Discuss your choices and plans for birth control with your midwife or physician. Women who are breastfeeding can still become pregnant. Use a birth control method if you want to lower your pregnancy risk. Talk to your midwife or physician if you are considering permanent birth control, such as tubal ligation or Essure. You may need to complete a consent form 30 days prior to delivery in order to have this done after you deliver.    Continue to watch for signs and symptoms of preeclampsia:     Sudden swelling of your face, hands, or feet     New vision problems such as blurring, double vision, or flashing lights    A severe headache not relieved with acetaminophen (Tylenol)    Sharp or stabbing pain in your right or middle upper abdomen    Watch for signs and symptoms of premature labor:     Regular contractions. This means having about 6 or more within 1 hour, even after you have had a glass of water and are resting.     A backache that starts and stops regularly.    An increase or change in vaginal discharge, such as heavy, mucus-like, watery, or bloody discharge.     Your water breaks or leaks.    If you have any of the above symptoms or any other concerns, call your provider or their clinic staff at [facility] at Phone:  [phone]. If it's after clinic hours, physician patients should call the [facility] at [phone] ([phone]); midwife patients should call their answering service at [phone].    How can you care for yourself at home?   You can refer to the Starting Out Right book or find it online at [email or web address] or [email or web address]     You can sign up for a weekly parenting e-mail that gives support, tips and advice from health care professionals that starts with pregnancy and continues through the toddler years. To register, go to [email or web address] at any time during your pregnancy.

## 2021-06-16 NOTE — PROGRESS NOTES
Normal cell free DNA test, normal alpha feta protein, and normal OB ultrasound but heart not visualized.  She is 20 weeks along today. She has felt some fluttering but was feeling more movement before her 16 week visit. She is concerned that she is not feeling consistent movement from baby. An OB ultrasound from 02/01/2018 showed anterior placenta, normal amniotic fluid, four-chamber heart and outflow tracts were not well seen from positioning but no other abnormalities seen with a due date of 7/19/2018.She is wondering if anterior placenta placement is normal. She is having difficulty putting her rings on. She is surprised because she did not experience edema until 36 week gestation.  Her last pregnancy flipped back and forth from breech position to head down position. She scheduled two EVCs during her last pregnancy but baby was head down by birth. She is seeing dermatology for a wart on her left foot that appeared after she she became pregnant. She denies gestational diabetes with her last pregnancy. Urinalysis on 02/01/2018 returned normal.       Vertigo: She felt like the room was spinning yesterday and this morning after she woke up. She has not experienced vertigo symptoms since this morning.     Physical   Neuro:  II+    Plan: Follow-up in 4 weeks.  If vertigo returns we can refer you vestibular occupational therapy.  OB Ultrasound to recheck fetal heart within a month.   Dermatology for full body skin check if due. At next visit you will have your one hour sugar test. Tell the  you are having the sugar test. You do not have to fast but eat light meals.         RADIOLOGY TESTS (1): US 02/01/2018 no abnormalities. Heart not visualized.   LABS (1): UA normal 02/01/2018.       The visit lasted a total of 18 minutes face to face with the patient. Over 50% of the time was spent counseling and educating the patient about routine OB.    IMaritza, am scribing for and in the presence of,   Harleen Rivera.    I, Dr. Harleen Rivera MD, personally performed the services described in this documentation, as scribed by Maritza Wolff in my presence, and it is both accurate and complete.    Data Points: 2

## 2021-06-16 NOTE — TELEPHONE ENCOUNTER
I think there is typically a form that needs to be filled out for diagnosis corrections. Do you want to talk with radiology and see what they would recommend changing to and then we could fill out the forms?

## 2021-06-16 NOTE — PROGRESS NOTES
S:  No repetitive contractions.  Felt heavy in her vaginal area.  First baby had breech presentation and was set up for versions on 2 different occasions but then baby did end up vertex and she had a vaginal delivery.  Second baby had unstable fetal lie going from transverse to vertex back to transverse.  Patient was admitted for external cephalic version and induction of labor around 39 weeks.  Vaginal delivery was successful.  Patient brings in her short-term disability papers to fill out.    O:  DTR 2+, UA-100 glu, Tr ketones, see flowsheet    A/P: 35-year-old  3 para 2-0-0-2 female at 34-2/7 weeks gestation.  O+ blood type.  Will be advanced maternal age at time of delivery  I will do your medical leave forms and give them to your next visit.  Recommend 64-96 oz of non-caffenated fluid per day.  Urinalysis every visit.  Next visit will be the hemoglobin and group B strep.  Next visit we will start nonstress tests and do that each visit.  Biophyscial profile ultrasound with estimated fetal weight to be done around 36 weeks. with you being advanced maternal age at the time of delivery.  Monitor the right arm skin lesion.  If it changes size shape color or bleeds we want you to see dermatology now otherwise you can see them after delivery.

## 2021-06-16 NOTE — TELEPHONE ENCOUNTER
The ultrasound on Sept 29 was for irregular menstruation and dating.  That indication was on the ultrasound report.  The ultrasound on December 18 was done for bleeding.  That indication was listed in the ultrasound report.      I do not know if radiology changes the ordering diagnosis?  I do not know how to go back in and change the diagnosis but this maybe could be done with coding.  Please forward this on to our coding department or call her insurance with this information.  Thank you so much.

## 2021-06-17 NOTE — PROGRESS NOTES
S: Overall feeling well.  Good fetal movement.  No questions or concerns today.  She is just hoping her baby will stay head down.  She feels just a little vaginal and pelvic pressure.  Recent biophysical profile 8 out of 8 and baby large for gestational age.    O:  DTR 2+, UA Tr blood, see flowsheet    A/P: 36-year-old  3 para 2-0-0-2 female at 37-2/7 weeks gestation.  O+ blood type.  Negative group B strep.  Advanced maternal age.  History of unstable fetal lie in previous pregnancies.  Possible large for gestational age.    If you feel contractions every 10 to 15 minutes or your water breaks with a gush or slow constant trickle please call and had into the hospital.  Last visit negative group B strep and hemoglobin 12.6.    Nonstress test today reactive.Handheld ultrasound for position today-head down.  Weekly with your visits we will check position with a handheld ultrasound because your previous pregnancies had unstable lie.

## 2021-06-17 NOTE — PROCEDURES
Here to meet patient to perform ECV as noted to be breech with Dr. Krista Rivera.  @39wks.      Prev 2 pregnancies complicated by unstable lie as well, but was successful with ECV and  in the past.      Today, BSUS shows adeq CRUZITO and Cephalic presentation.      SVE:  FT/ant/soft/80% and -2 station  Category 1 NST  TOCO:  None    Melisa Beach DO, FACOG  2021 8:02 AM

## 2021-06-17 NOTE — PROGRESS NOTES
S: Patient is overall feeling well.  Good fetal movement.  No regular contractions.  No vaginal bleeding.  Biophysical profile ultrasound which was 8 out of 8.  It did show a large for gestational age baby, about 8 lb.  Baby was head down.    O: DTR 2+, UA with a trace of blood, baby just measuring 1 cm ahead for dates and palpates about 7 pounds with Leopold's, see flowsheet    A/P: 35-year-old  3 para 2-0-0-2 female at 36-2/7 weeks gestation.  Advanced maternal age at time of delivery.  Possible large for gestational age baby.  Today hemoglobin and group B strep.  Nonstress test each visit.  Nonstress test reactive today.  Biophysical profile 8 out of 8.  Hemoglobin normal at 12.6 today.  Follow-up in 1 week.  Weekly with your visits we will check position with a handheld ultrasound because your previous pregnancies had unstable lie.  Gave patient the completed short-term disability forms.  Copy was made for the chart.

## 2021-06-17 NOTE — PROGRESS NOTES
How far alon+0  Feeling baby moving:  Yes  Nausea/Vomiting:  No  Bleeding/Abnormal discharge/Leaking of fluid:  No  Cramping/abdominal pain/contractions:  Mild cramping on and off  Headaches: No  Swelling:  No

## 2021-06-17 NOTE — ANESTHESIA PROCEDURE NOTES
Epidural Block    Patient location during procedure: OB  Time Called: 5/21/2021 11:12 PM  Reason for Block:labor epidural  Staffing:  Performing  Anesthesiologist: Daniel Houston MD  Preanesthetic Checklist  Completed: patient identified, risks, benefits, and alternatives discussed, timeout performed, consent obtained, at patient's request, airway assessed, oxygen available, suction available, emergency drugs available and hand hygiene performed  Procedure  Patient position: sitting  Prep: ChloraPrep  Patient monitoring: continuous pulse oximetry, heart rate and blood pressure  Approach: midline  Location: L2-L3  Injection technique: CHERRI saline  Number of Attempts:1  Needle  Needle type: SoLatinamabel   Needle gauge: 18 G     Catheter in Space: 5  Assessment  Sensory level: T8  No complications

## 2021-06-17 NOTE — TELEPHONE ENCOUNTER
Melisa from [facility] called to see if Dr. Rivera has looked at the results from US done yesterday.     Melisa would like a call back to conform that Dr. Rivera has reviewed these results    Please call Melisa back at [phone]

## 2021-06-17 NOTE — ANESTHESIA POSTPROCEDURE EVALUATION
Patient: Melisa Daily  * No procedures listed *  Anesthesia type: epidural    Patient location: Labor and Delivery  Last vitals: No vitals data found for the desired time range.    LABOR EPIDURAL POST-OP NOTE    Last vitals:  Vitals:    05/22/21 1053   BP: 132/67   Pulse: 95   Resp:    Temp:    SpO2:        Functioning epidural:  good pain relief  Level of consciousness: awake, alert and oriented  Respiratory: unassisted  Cardiovascular: stable and blood pressure at baseline  Hydration: euvolemic  Headache:  none,      Backache:  none  Lower Extremities:  residual numbness/weakness  - residual numbness in LFNC distribution R thigh - improving. O/w full return of strength and sensation.  Ambulation:  able to ambulation without problem  Other Anesthesia Complications:  none

## 2021-06-17 NOTE — PATIENT INSTRUCTIONS - HE
We were unable to do nonstress test in clinic today.    I am feeling like baby is not head down today with ultrasound and exam.    Ordered biophysical profile ultrasound with estimated fetal weight and nonstress test for tonight.    If baby is breech we will discuss getting you set up for a version.    Then I will check with OB if we can then plan a 39-week induction with unstable fetal lie.         I will write a letter and send you via [employer] regarding the December 18 ultrasound.  It was done for bleeding but I can also note in the letter that this would have been also a routine ultrasound for anybody who had bleeding in pregnancy.     Since you have had the Covid vaccines it would be a good idea to get the Covid antibody test on baby in the hospital.    Covid swab today for you with possible upcoming version.    If you feel contractions every 10 to 15 minutes or your water breaks with a gush or slow constant trickle please call and had into the hospital.    Biophysical profile tonight 8 out of 8 with baby greater than 90 percentile for growth.  Baby is transverse with fetal head maternal right low pelvis.    I did speak to Dr. [facility]isa [facility] of [facility] who is agreeable to do an external cephalic version on Tuesday, May 18.  This is to ensure that she is 39 weeks and once baby is head down we can then induce.    I did set her up for induction Tuesday, May 18 for unstable fetal lie.    Patient should call the hospital at 6 AM.    Covid swab ordered for Saturday, May 15.    I did speak to the patient tonight after the biophysical profile ultrasound, nonstress test and discussing with OB/GYN.  She is agreeable to the above plan.    Patient Education   HEALTHY PREGNANCY CARE: 37 to 41 WEEKS PREGNANT    Talk with your midwife or physician about when to call with signs of labor    Regular uterine contractions that are getting closer together and/or stronger    If you think your water has broken or is  leaking    Bleeding from the vagina like a period (bloody vaginal discharge is normal)    If you are not feeling your baby move    Make plans for transportation and  as needed for when you are going to the hospital.    Your midwife or physician may offer to check your cervix for changes.     Ask your health care provider about vaccinations you may need following delivery. By now, you should have received a Tdap immunization to protect against pertussis or whooping cough. Fathers and family members who will be in close contact with the baby should also receive a Tdap shot at least two weeks before the expected birth of the baby if they have not had a Td (tetanus) shot for at least two years.    If you are past your due date, discuss the next steps leading to delivery with your midwife or physician. If you don't start labor on your own by 41 or 42 weeks, your midwife or physician may recommend giving you medicines to ripen your cervix and start labor.    Preparing for your baby: Tell your midwife or physician how you plan to feed your baby (breast or bottle), who you have chosen to do pediatric care for your baby, and if you have a boy, whether you have chosen to have him circumcised. You will need a car seat correctly installed in your vehicle to bring your baby home. As you start to set up the nursery at home for your baby, make sure the crib is safe. The mattress needs to fit snugly against the edges of the crib. If you can fit a soda can between the bars, they are too far apart and can allow the baby's head to caught between them.    Learn about infant care and feeding, including information about infant CPR. We recommend that you put your baby to sleep on his or her back to reduce the chance of Sudden Infant Death Syndrome (SIDS). To maintain a healthy environment in which your child can grow, it's best to keep your home smoke-free. By preparing ahead, your transition into parenthood will go smoothly  for you and your baby.    Your midwife or physician will want to see you for a checkup 2 to 6 weeks after delivery.    If you have questions about any symptoms you are experiencing or any other concerns, call your provider or their clinic staff at [facility] at Phone: [phone]. If it's after clinic hours, physician patients should call the [facility] at [phone] ([phone]); midwife patients should call their answering service at [phone].    How can you care for yourself at home?   You can refer to the Starting Out Right book or find it online at [email or web address]/[email or web address]/[email or web address] or [email or web address]/[email or web address]-starting-out-[email or web address]#p=8     You can sign up for a weekly parenting e-mail that gives support, tips and advice from health care professionals that starts with pregnancy and continues through the toddler years. To register, go to [email or web address] at any time during your pregnancy.    Making Early Breastfeeding or Chestfeeding Work: What's Important?  Breastfeeding/chestfeeding is important!     It helps keep babies healthy.    Parents who breastfeed/chestfeed have lower risks of breast and ovarian cancer.    It's convenient: the milk is always ready and warm, and there is nothing to mix or prepare for feeding.    Formula is harder for your baby to digest.    It helps you bond with your baby and protects against postpartum depression.  Lots of early skin-to-skin contact with your baby    Place your naked baby with baby's belly against your bare chest. Cover baby's back with a blanket    Start skin-to-skin right after birth, as soon as you are ready    Skin-to-skin:  ? Helps keep baby warm  ? Improves baby's oxygen and blood sugar levels  ? Helps your uterus contract and bleed less  ? Helps baby feel calm and comforted  ? Helps you feel close to  "baby  ? Helps get breastfeeding started. Being close makes latching on easier, and baby may move over to the nipple and latch without help  ? Baby breastfeeds better and longer when skin-to-skin  Placing baby well for good attachment to the breast or chest    Hold your baby close with baby's tummy touching your tummy.    Wait for baby to open mouth wide, then bring baby onto the breast/chest.    Baby should take a big mouthful of breast/chest, not just the nipple. This helps baby get more milk, and the suckling should feel comfortable.    When baby is latched well to the breast/chest, nipples aren't cracked or painful.  Keeping baby near (called \"rooming-in\" at the hospital)    Baby sleeps better and cries less when birth parent is near. Your room is quiet.    We will place your baby safely on their back in their bassinette. This lets you practice safe sleep for your baby while keeping them at your bedside.    Baby feeds more often, which means your milk supply increases faster, and your baby loses less weight.    Parents have an easier time getting to know and bonding with baby.    Parents feel much more confident about baby care and breastfeeding/chestfeeding.    Maternity staff can help at any time.  Feeding on cue    Feeding on cue simply means feeding whenever your baby shows signs of hunger    Crying is a late hunger sign. Feed baby whenever baby wants for as long as baby wants.    Feeding signs: mouth movements, sticking the tongue out, rooting (baby turns toward chest and may open mouth), hand-to-mouth movements    Advantages of feeding on cue:  ? Frequent breastfeeding/chestfeeding in the first weeks after birth gives you a good milk supply for months to come.  ? Babies settle into a relaxing feed more quickly. Babies enjoy feedings more when they don't have to cry to be fed.  ? Feeding is comfort as well as nutrition. Newborns love constant closeness and feeding and can't be held \"too much\" or " "\"spoiled.\"  ? Newborns need small frequent feedings in the first days of life. Just one to three teaspoons fill a new baby's stomach.  ? Responding to feeding cues helps babies gain weight.  Feeding only human milk in the first six months    Colostrum is the first milk that baby gets at birth. The amount of colostrum matches the baby's stomach, so it will not be overfilled.    The small volumes ready at birth are also easier for baby to handle.    All babies lose weight in the first few days. This is simply \"water weight.\"    Introducing food or fluids other than human milk too early can cause problems for breastfeeding/chestfeeding and for your baby's health.    Feeding only human milk maximizes the protection against disease and infections.    Your body knows how much milk to make by how often your baby feeds. If you give your baby formula, your body may not know how much milk to make.    For informational purposes only. Not to replace the advice of your health care provider. Adapted with permission from \"Getting Started with Infant Care and Breastfeeding,\" by JANAY Trent, WILFREDO, Dasha Esteves, RN, IBCLC, Lary Bautista MD, WILFREDO, and Blessing Durham MD, IBCLC. [place] [employer], 2017. Clinically reviewed by [facility]. [employer] 552935 - 05/19.        [facility] Breastfeeding Resources     Research shows that human milk offers the best  nutrition and protection for babies. So at [facility],  we care for families and babies in a way that  promotes, teaches and supports lactation. We  support all breastfeeding, chestfeeding and human  milk feeding families, as well as families who can t  breastfeed / chestfeed or who choose not to do so.  A mother or caregiver s own milk is best for a baby,  but if you are unable to breastfeed / chestfeed, we  may suggest pasteurized donor human milk for your  baby while in the hospital.    Lactation support    The following [facility]-affiliated " locations offer  individual outpatient lactation consults. Some  locations offer phone or group lactation consults  with certified lactation consultants. Call to confirm  services and for information and appointments. You  may wish to call your insurance company first to see  if they will cover the cost of a consult.    [facility]: [phone]    [facility]: [phone]    [facility]: [phone]  Offers [place] First Days program, which includes  group lactation visits and one free information session  about delivering your baby at a designated [facility] hospital and the importance and benefits  of breastfeeding and human milk. These group visits  also help patients with feeding concerns and offer  information about other postpartum topics.                For informational purposes only. Not to replace the advice of your health care provider. Copyright   2005 [facility]. All rights reserved. [employer] 059656 - REV          [facility] ([place]):  [phone]    [facility]):  [phone] or [phone]    [facility]):  [phone]    [facility]  ([place]): [phone]    [facility] ([place]):  [phone] or [phone]  Includes follow-up visits for caregivers of babies  discharged from the  intensive care unit  (NICU) at [facility].    [facility]):  [phone]    [facility] ([facility],  [facility], primary care clinics):  [phone]  Also offers weight checks, feeding discussions and support with a lactation consultant as a part of free, weekly support group.    [facility]: [phone]  Also offers a free weekly support group.                                                                                                                                                                                                       (continued)   You may also call [facility] at [phone]  for information about [place] and [facility]  locations that offer lactation support. For information  about breastfeeding / chestfeeding and childbirth  classes in the [facility], go to [facility] at www.[employer]. For [facility], go to [email or web address] and click on  Classes and Events at the bottom of the page. Or, call  [phone].    Supplies    You can get breast pumps from the Birth[facility] nurses  at [facility] or [facility]  nurses at [facility]. Call your health  insurance to see if they will cover the cost of the  pump. Tell your nurse you d like a pump. They will  help you fill out the right paperwork. The pump will  be ready for you when you leave the hospital.    If you decide to get a pump after you leave the  hospital, you can get one from our partners at  [facility]. [facility] carries a range of  feeding supplies and pumps. Please call your health  insurance to see if they will cover the cost of the  pump. Then call [facility]  to find out what supplies and pumps are available.  Some stores may deliver the pump to your home.    [facility]:  [email or web address] Other lactation services    [facility]: [phone] (24 hours a day)  [email or web address]  Offers support for breastfeeding / chestfeeding and  human milk feeding families. Call to find a group in  your area.    National [facility] Information [facility]:  [phone]  www.womenshealth.gov/breastfeeding  Offers a breastfeeding / chestfeeding information  line in English and Latvian.    WIC (Women, Infants and Children) Program:  [phone]  Offers breastfeeding / chestfeeding counseling.  Call  to find an office near you.    Milk banking    [facility]  [email or web address]  [phone]  Consider freezing your extra collected milk to donate  to babies in need. Email or call for information.                       If you are deaf or hard of hearing, please let us know. We provide many free services including  sign language interpreters, oral interpreters, TTYs, telephone amplifiers, note takers and written materials.

## 2021-06-17 NOTE — PATIENT INSTRUCTIONS - HE
Patient Instructions by Ariana Cintron CNP at 5/19/2019  9:10 AM     Author: Ariana Cintron CNP Service: -- Author Type: Nurse Practitioner    Filed: 5/19/2019  9:53 AM Encounter Date: 5/19/2019 Status: Addendum    : Ariana Cintron CNP (Nurse Practitioner)    Related Notes: Original Note by Ariana Cintron CNP (Nurse Practitioner) filed at 5/19/2019  9:48 AM       Patient Education     Seasonal Allergy  Seasonal allergy is also called hay fever. It may occur after a person is exposed to pollens released from grasses, weeds, trees and shrubs. This type of allergy occurs during the spring and summer when the pollen contacts the lining of the nose, eyes, eyelids, sinuses and throat. This causes histamine to be released from the tissues. Histamine causes itching and swelling. This may produce a watery discharge from the eyes or nose. Violent sneezing, nasal congestion, post-nasal drip, itching of the eyes, nose, throat and mouth, scratchy throat, and dry cough may also occur.  Home care  Seasonal allergy cannot be cured, but symptoms can be reduced by these measures:    Stay away from or limit your time near the allergen as much as you can:    ? Stay indoors on windy days of pollen season.   ? Keep windows and doors closed. Use air conditioning instead in your home and car. This filters the air.  ? Change air conditioner filters often.  ? Take a shower, wash your hair, and change clothes after being outdoors.  ? Put on a NIOSH-rated 95 filter mask when working outdoors. Before going outside, take your allergy medicine as advised by your healthcare provider.    Decongestant pills and sprays reduce tissue swelling and watery discharge. Overuse of nasal decongestant sprays may make symptoms worse. Do not use these more often than recommended. Sometimes you can experience a rebound effect (symptoms worsen), when stopping them. Talk to your healthcare provider or pharmacist about these medicines before taking  them, especially if you have high blood pressure or heart problems.     Antihistamines block the release of histamine during the allergic response. They work better when taken before symptoms develop. Unless a prescription antihistamine was prescribed, you can take over-the-counter antihistamines that do not cause drowsiness.  Ask your pharmacist for suggestions.    Steroid nasal sprays or oral steroids may also be prescribed for more severe symptoms. These help to reduce the local inflammation that can add to the allergic response.    If you have asthma, pollen season may make your asthma symptoms worse. It is important that you use your asthma medicines as directed during this time to prevent or treat attacks. Some persons with asthma have asthma symptoms that get worse when they take antihistamines. This is due to the drying effect on the lungs. If you notice this, stop the antihistamines, drink extra fluids and notify your doctor.    If you have sinus congestion or drainage, a saline nasal rinse may give relief. A saline nasal rinse lessens the swelling and clears excess mucus. This allows sinuses to drain. Prepackaged kits are sold at most drug stores. These contain pre-mixed salt packets and an irrigation device.  Follow-up care  Follow up with your healthcare provider, or as advised. If you have been referred to a specialist, make an appointment promptly.  When to seek medical advice  Call your healthcare provider right away for any of the following:    Facial, ear or sinus pain; colored drainage from the nose    Headaches    You have asthma and your asthma symptoms do not respond to the usual doses of your medicine    Cough with colored sputum (mucus)    Fever of 100.4 F (38 C) or higher, or as directed by the healthcare provider  Call 911  Call 911 if any of these occur:    Trouble breathing or swallowing, wheezing    Hoarse voice, trouble speaking, or drooling    Confusion    Very drowsy or trouble  awakening    Fainting or loss of consciousness    Rapid heart rate, or weak pulse    Low blood pressure    Feeling of doom    Nausea, vomiting, abdominal pain, diarrhea    Vomiting blood, or large amounts of blood in stool    Seizure    Cold, moist, or pale (blue in color) skin  Date Last Reviewed: 5/1/2017 2000-2017 [employer]. [address] [place] [address]. All rights reserved. This information is not intended as a substitute for professional medical care. Always follow your healthcare professional's instructions.         Please take OTC Tylenol and Motrin for comfort.   Encourage fluids.   Rest.   Can take Robitussin with Codeine at bedtime. DO NOT drive or operate heavy machinery when on this medication.   Increase Flonase to twice daily for one week.

## 2021-06-17 NOTE — PROGRESS NOTES
Melisa is present for routine OB and is present with her daughter. Normal cell free DNA, normal alpha feta protein, US on 2/26/18 show normal fetal survey and repeat US on 3/23/18 showed normal heart. Baby is moving more often, however, she is able to sleep well. During her last pregnancy, her daughter was breech and flipped after moxibustion treatment. She would burn moxibustion sticks for 10 minutes at each foot and she felt her daughter turn. She had 2 eversions scheduled, but when she presented at her last eversion appointment her daughter was head down. She denies contraction, but baby was very active last night and she thought they could have been contractions. She is planning on birth at [facility]. She is amenable to the Tdap vaccine at next visit. She is having a girl.    Plan:   Tdap at next visit.     Physical:  Patellar Reflexes: 1+      LABS (1): UA ordered.     The visit lasted a total of 15 minutes face to face with the patient. Over 50% of the time was spent counseling and educating the patient about routine OB.    IMaritza, am scribing for and in the presence of, Dr. Harleen Chicas MD, personally performed the services described in this documentation, as scribed by Maritza Wolff in my presence, and it is both accurate and complete.    Data Points: 1

## 2021-06-17 NOTE — PATIENT INSTRUCTIONS - HE
If you feel contractions every 10 to 15 minutes or your water breaks with a gush or slow constant trickle please call and had into the hospital.    Nonstress test today reactive.    Handheld ultrasound for position today-head down.    Weekly with your visits we will check position with a handheld ultrasound because your previous pregnancies had unstable lie.    Patient Education   HEALTHY PREGNANCY CARE: 37 to 41 WEEKS PREGNANT    Talk with your midwife or physician about when to call with signs of labor    Regular uterine contractions that are getting closer together and/or stronger    If you think your water has broken or is leaking    Bleeding from the vagina like a period (bloody vaginal discharge is normal)    If you are not feeling your baby move    Make plans for transportation and  as needed for when you are going to the hospital.    Your midwife or physician may offer to check your cervix for changes.     Ask your health care provider about vaccinations you may need following delivery. By now, you should have received a Tdap immunization to protect against pertussis or whooping cough. Fathers and family members who will be in close contact with the baby should also receive a Tdap shot at least two weeks before the expected birth of the baby if they have not had a Td (tetanus) shot for at least two years.    If you are past your due date, discuss the next steps leading to delivery with your midwife or physician. If you don't start labor on your own by 41 or 42 weeks, your midwife or physician may recommend giving you medicines to ripen your cervix and start labor.    Preparing for your baby: Tell your midwife or physician how you plan to feed your baby (breast or bottle), who you have chosen to do pediatric care for your baby, and if you have a boy, whether you have chosen to have him circumcised. You will need a car seat correctly installed in your vehicle to bring your baby home. As you start to  set up the nursery at home for your baby, make sure the crib is safe. The mattress needs to fit snugly against the edges of the crib. If you can fit a soda can between the bars, they are too far apart and can allow the baby's head to caught between them.    Learn about infant care and feeding, including information about infant CPR. We recommend that you put your baby to sleep on his or her back to reduce the chance of Sudden Infant Death Syndrome (SIDS). To maintain a healthy environment in which your child can grow, it's best to keep your home smoke-free. By preparing ahead, your transition into parenthood will go smoothly for you and your baby.    Your midwife or physician will want to see you for a checkup 2 to 6 weeks after delivery.    If you have questions about any symptoms you are experiencing or any other concerns, call your provider or their clinic staff at [facility] at Phone: [phone]. If it's after clinic hours, physician patients should call the Care Connection at [phone] ([phone]); midwife patients should call their answering service at [phone].    How can you care for yourself at home?   You can refer to the Starting Out Right book or find it online at [email or web address] or [email or web address]#[email or web address]     You can sign up for a weekly parenting e-mail that gives support, tips and advice from health care professionals that starts with pregnancy and continues through the toddler years. To register, go to [email or web address] at any time during your pregnancy.    Making Early Breastfeeding or Chestfeeding Work: What's Important?  Breastfeeding/chestfeeding is important!     It helps keep babies healthy.    Parents who breastfeed/chestfeed have lower risks of breast and ovarian cancer.    It's convenient:  "the milk is always ready and warm, and there is nothing to mix or prepare for feeding.    Formula is harder for your baby to digest.    It helps you bond with your baby and protects against postpartum depression.  Lots of early skin-to-skin contact with your baby    Place your naked baby with baby's belly against your bare chest. Cover baby's back with a blanket    Start skin-to-skin right after birth, as soon as you are ready    Skin-to-skin:  ? Helps keep baby warm  ? Improves baby's oxygen and blood sugar levels  ? Helps your uterus contract and bleed less  ? Helps baby feel calm and comforted  ? Helps you feel close to baby  ? Helps get breastfeeding started. Being close makes latching on easier, and baby may move over to the nipple and latch without help  ? Baby breastfeeds better and longer when skin-to-skin  Placing baby well for good attachment to the breast or chest    Hold your baby close with baby's tummy touching your tummy.    Wait for baby to open mouth wide, then bring baby onto the breast/chest.    Baby should take a big mouthful of breast/chest, not just the nipple. This helps baby get more milk, and the suckling should feel comfortable.    When baby is latched well to the breast/chest, nipples aren't cracked or painful.  Keeping baby near (called \"rooming-in\" at the hospital)    Baby sleeps better and cries less when birth parent is near. Your room is quiet.    We will place your baby safely on their back in their bassinette. This lets you practice safe sleep for your baby while keeping them at your bedside.    Baby feeds more often, which means your milk supply increases faster, and your baby loses less weight.    Parents have an easier time getting to know and bonding with baby.    Parents feel much more confident about baby care and breastfeeding/chestfeeding.    Maternity staff can help at any time.  Feeding on cue    Feeding on cue simply means feeding whenever your baby shows signs of " "hunger    Crying is a late hunger sign. Feed baby whenever baby wants for as long as baby wants.    Feeding signs: mouth movements, sticking the tongue out, rooting (baby turns toward chest and may open mouth), hand-to-mouth movements    Advantages of feeding on cue:  ? Frequent breastfeeding/chestfeeding in the first weeks after birth gives you a good milk supply for months to come.  ? Babies settle into a relaxing feed more quickly. Babies enjoy feedings more when they don't have to cry to be fed.  ? Feeding is comfort as well as nutrition. Newborns love constant closeness and feeding and can't be held \"too much\" or \"spoiled.\"  ? Newborns need small frequent feedings in the first days of life. Just one to three teaspoons fill a new baby's stomach.  ? Responding to feeding cues helps babies gain weight.  Feeding only human milk in the first six months    Colostrum is the first milk that baby gets at birth. The amount of colostrum matches the baby's stomach, so it will not be overfilled.    The small volumes ready at birth are also easier for baby to handle.    All babies lose weight in the first few days. This is simply \"water weight.\"    Introducing food or fluids other than human milk too early can cause problems for breastfeeding/chestfeeding and for your baby's health.    Feeding only human milk maximizes the protection against disease and infections.    Your body knows how much milk to make by how often your baby feeds. If you give your baby formula, your body may not know how much milk to make.    For informational purposes only. Not to replace the advice of your health care provider. Adapted with permission from \"Getting Started with Infant Care and Breastfeeding,\" by JANAY Trent, WILFREDO, Dasha Esteves, RN, IBCLC, Lary Bautista MD, WILFREDO, and Blessing Durham MD, IBCLC. [place] [employer], 2017. Clinically reviewed by [facility]. [employer] 571309 - 05/19.        [place] " Breastfeeding Resources     Research shows that human milk offers the best  nutrition and protection for babies. So at [facility],  we care for families and babies in a way that  promotes, teaches and supports lactation. We  support all breastfeeding, chestfeeding and human  milk feeding families, as well as families who can t  breastfeed / chestfeed or who choose not to do so.  A mother or caregiver s own milk is best for a baby,  but if you are unable to breastfeed / chestfeed, we  may suggest pasteurized donor human milk for your  baby while in the hospital.    Lactation support    The following [facility] locations offer  individual outpatient lactation consults. Some  locations offer phone or group lactation consults  with certified lactation consultants. Call to confirm  services and for information and appointments. You  may wish to call your insurance company first to see  if they will cover the cost of a consult.    [facility]: [phone]    [facility]: [phone]    [facility]: [phone]  Offers [facility] First Days program, which includes  group lactation visits and one free information session  about delivering your baby at a designated [facility] hospital and the importance and benefits  of breastfeeding and human milk. These group visits  also help patients with feeding concerns and offer  information about other postpartum topics.                For informational purposes only. Not to replace the advice of your health care provider. Copyright   2005 [facility]. All rights reserved. [employer] 410492 - REV 01/19         [facility] ([place]):  [phone]    [facility] ([place]):  [phone] or [phone]    [facility] ([place]):  [phone]    [facility] Breast[facility]  ([place]): [phone]    [facility]  ([place]):  [phone] or [phone]  Includes follow-up visits for caregivers of babies  discharged from the  intensive care unit  (NICU) at [facility].    [facility] ([place]):  [phone]    [facility] ([facility],  [facility], primary care clinics):  [phone]  Also offers weight checks, feeding discussions and support with a lactation consultant as a part of free, weekly support group.    [facility]: [phone]  Also offers a free weekly support group.                                                                                                                                                                                                      (continued)   You may also call [facility] at [phone]  for information about [place] and [facility]  locations that offer lactation support. For information  about breastfeeding / chestfeeding and childbirth  classes in the [facility], go to [employer] at [email or web address]. For [facility], go to www.[email or web address] SEO and click on  Classes and Events at the bottom of the page. Or, call  [phone].    Supplies    You can get breast pumps from the Birthplace nurses  at [facility] or [facility]  nurses at [facility]. Call your health  insurance to see if they will cover the cost of the  pump. Tell your nurse you d like a pump. They will  help you fill out the right paperwork. The pump will  be ready for you when you leave the hospital.    If you decide to get a pump after you leave the  hospital, you can get one from our partners at  [facility]. [facility] carries a range of  feeding supplies and pumps. Please call your health  insurance to see if they will cover the cost of the  pump. Then call [facility]  to find out what supplies and pumps are  available.  Some stores may deliver the pump to your home.    [employer] [facility]:  [email or web address] Other lactation services    [facility]: [phone] (24 hours a day)  www.[email or web address]  Offers support for breastfeeding / chestfeeding and  human milk feeding families. Call to find a group in  your area.    National [facility]:  [phone]  www.womenshealth.gov/breastfeeding  Offers a breastfeeding / chestfeeding information  line in English and Kinyarwanda.    WIC (Women, Infants and Children) Program:  [phone]  Offers breastfeeding / chestfeeding counseling. Call  to find an office near you.    Milk banking    [facility]  [email or web address]  [phone]  Consider freezing your extra collected milk to donate  to babies in need. Email or call for information.                       If you are deaf or hard of hearing, please let us know. We provide many free services including  sign language interpreters, oral interpreters, TTYs, telephone amplifiers, note takers and written materials.

## 2021-06-17 NOTE — TELEPHONE ENCOUNTER
Yes I saw them and sent a [employer] message yesterday afternoon.  Please let me know if she has questions.

## 2021-06-17 NOTE — PROGRESS NOTES
She is 24 weeks today and feels tired. She is completing her one hour glucose testing today. Normal cell free DNA test, normal alpha feta protein, and US on 2/26//2018 normal survey with limited visualization of the heart US on 03/23/2018 showed fetal heart shows normal four-chamber heart and outflow tracts.  She received a flu shot in the fall and is amenable to receiving the Tdap shot at her next visit. She would like to breast feed, but had trouble breast feeding with her daughter and had to pump. She denies contractions but was sore and had to rest after moving around. She denies edema. She would like to avoid using drugs during labor since she had an episode of emesis after using nitrous with her first delivery. She is wondering when baby's placement can be felt.      URI: She has noticed increased congestion since her 20 week appointment. Walk in clinic diagnosed her with a sinus infection but did not treat with medication. She denies facial pain, fever, chills, or any facial soreness. Her headache has resolved since and has lingering congestion. She is unable to breathe through her nose and is unable to sleep, however, she has tried breathe right strips but did not notice any relief. She has used Rhinocort aqua in the past and is amenable to using it again.       Physical:   Patellar Reflex 2+    Plan:  Nasal saline or netti pot with sterile water 1-2 times per day.  Restart Rhinocort aqua 2 sprays each nostril daily for a month.  Labs as ordered.   Next visit will be 30 weeks. We will have cervical check and urine test. Tdap shot next visit or the following.    RADIOLOGY TESTS (1): Reviewed US on 03/23/2018. Normal fetal heart.    The visit lasted a total of 16 minutes face to face with the patient. Over 50% of the time was spent counseling and educating the patient about routine OB.    I, Maritza Wolff, am scribing for and in the presence of, Dr. Harleen Rivera.    I, Dr. Harleen Rivera MD,  personally performed the services described in this documentation, as scribed by Maritza Wolff in my presence, and it is both accurate and complete.    Data Points: 1

## 2021-06-17 NOTE — PROGRESS NOTES
S: No regular contractions.  She does feel some pelvic pressure and does not feel like baby hopefully has moved out of vertex presentation.    O:  DTR 2+, UA neg, handheld ultrasound does not show baby in a vertex presentation, possibly transverse    A/P: 36-year-old  3 para 2-0-0-2 female at 39-2/7 weeks gestation.  O+ blood type.  Negative group B strep.  Unstable fetal lie.  Advanced maternal age.  Large for gestational age baby.  If you do end up needing a  you certainly could have a tubal ligation at the same time.  Please just check with insurance on coverage.   Do not eat or drink now until after the version.  Present to [place]'s labor and delivery about 505 tonight.  Dr. Garner am the in-house OB will meet you up there perform the ultrasound and hopefully the version.  Then we will admit you for Cytotec induction.  Dr. Rivera will round on you this evening.  NST reactive.

## 2021-06-17 NOTE — PROGRESS NOTES
"S:  Felt \"flip turn\" of baby overnight.  Both her pregnancies baby had unstable fetal lie.  First pregnancy baby was not vertex on 2 occasions and set up for versions.  When she got to the hospital baby was vertex.  She was able to then go into labor naturally and have a vaginal delivery.  Her last pregnancy baby also had unstable lie flipping between vertex and not vertex.  With OB consultation she did have a external cephalic version at 39 weeks gestation to vertex and had an induction for an unstable fetal lie.  She was able to have a normal vaginal delivery.  She also wonders if I could write a letter for better insurance coverage for her ultrasound .  Feeling good fetal movement.  No regular contractions.    O:  DTR 2+, UA neg, see flowsheet    A/P: 36-year-old  3 para 2-0-0-2 female at 30-2/7 weeks gestation.  Unstable fetal lie.  Large for gestational age baby.  Advanced maternal age.  Negative group B strep.  O+ blood type.  We were unable to do nonstress test in clinic today.   I am feeling like baby is not head down today with ultrasound and exam.  Ordered biophysical profile ultrasound with estimated fetal weight and nonstress test for tonight.  If baby is breech we will discuss getting you set up for a version.  Then I will check with OB if we can then plan a 39-week induction with unstable fetal lie.       I will write a letter and send you via [employer] regarding the  ultrasound.  It was done for bleeding but I can also note in the letter that this would have been also routine ultrasound for anybody who had bleeding in pregnancy.   Since you have had the Covid vaccines it would be a good idea to get the Covid antibody test on baby in the hospital.  Covid swab today for you with possible upcoming version.  If you feel contractions every 10 to 15 minutes or your water breaks with a gush or slow constant trickle please call and had into the hospital.  Biophysical profile tonight " 8 out of 8 with baby greater than 90 percentile for growth.  Baby is transverse with fetal head maternal right low pelvis.  I did speak to Dr. Melisa Thompson of [facility] who is agreeable to do an external cephalic version on Tuesday, May 18.  This is to ensure that she is 39 weeks and once baby is head down we can then induce.  I did set her up for induction Tuesday, May 18 for unstable fetal lie.  Patient should call the hospital at 6 AM.  Covid swab ordered for Saturday, May 15.  I did speak to the patient tonight after the biophysical profile ultrasound, nonstress test and discussing with OB/GYN.  She is agreeable to the above plan.

## 2021-06-17 NOTE — PROGRESS NOTES
Fetus in a vertex position via ultrasound performed by Dr Beach. SVE performed by Dr Beach, Dr Rivera notified and discussed induction with Melisa, Her choice is to go home and wait for labor with a follow up appointment Thursday in the clinic.  Discharged to home with [facility] phone number.

## 2021-06-17 NOTE — PATIENT INSTRUCTIONS - HE
If you do end up needing a  you certainly could have a tubal ligation at the same time.  Please just check with insurance on coverage.     Do not eat or drink now until after the version.    Present to [facility] labor and delivery about 505 tonight.    Dr. Garner am the in-house OB will meet you up there perform the ultrasound and hopefully the version.    Then we will admit you for Cytotec induction.    Dr. Rivera will round on you this evening.    NST reactive.      Patient Education   HEALTHY PREGNANCY CARE: 37 to 41 WEEKS PREGNANT    Talk with your midwife or physician about when to call with signs of labor    Regular uterine contractions that are getting closer together and/or stronger    If you think your water has broken or is leaking    Bleeding from the vagina like a period (bloody vaginal discharge is normal)    If you are not feeling your baby move    Make plans for transportation and  as needed for when you are going to the hospital.    Your midwife or physician may offer to check your cervix for changes.     Ask your health care provider about vaccinations you may need following delivery. By now, you should have received a Tdap immunization to protect against pertussis or whooping cough. Fathers and family members who will be in close contact with the baby should also receive a Tdap shot at least two weeks before the expected birth of the baby if they have not had a Td (tetanus) shot for at least two years.    If you are past your due date, discuss the next steps leading to delivery with your midwife or physician. If you don't start labor on your own by 41 or 42 weeks, your midwife or physician may recommend giving you medicines to ripen your cervix and start labor.    Preparing for your baby: Tell your midwife or physician how you plan to feed your baby (breast or bottle), who you have chosen to do pediatric care for your baby, and if you have a boy, whether you have chosen to  have him circumcised. You will need a car seat correctly installed in your vehicle to bring your baby home. As you start to set up the nursery at home for your baby, make sure the crib is safe. The mattress needs to fit snugly against the edges of the crib. If you can fit a soda can between the bars, they are too far apart and can allow the baby's head to caught between them.    Learn about infant care and feeding, including information about infant CPR. We recommend that you put your baby to sleep on his or her back to reduce the chance of Sudden Infant Death Syndrome (SIDS). To maintain a healthy environment in which your child can grow, it's best to keep your home smoke-free. By preparing ahead, your transition into parenthood will go smoothly for you and your baby.    Your midwife or physician will want to see you for a checkup 2 to 6 weeks after delivery.    If you have questions about any symptoms you are experiencing or any other concerns, call your provider or their clinic staff at [facility] at Phone: [phone]. If it's after clinic hours, physician patients should call the [facility] at [phone] ([phone]); midwife patients should call their answering service at [phone].    How can you care for yourself at home?   You can refer to the Starting Out Right book or find it online at [email or web address] or [email or web address]#[email or web address]     You can sign up for a weekly parenting e-mail that gives support, tips and advice from health care professionals that starts with pregnancy and continues through the toddler years. To register, go to [email or web address] at any time during your pregnancy.    Making Early Breastfeeding or Chestfeeding Work: What's Important?  Breastfeeding/chestfeeding is important!     It helps  "keep babies healthy.    Parents who breastfeed/chestfeed have lower risks of breast and ovarian cancer.    It's convenient: the milk is always ready and warm, and there is nothing to mix or prepare for feeding.    Formula is harder for your baby to digest.    It helps you bond with your baby and protects against postpartum depression.  Lots of early skin-to-skin contact with your baby    Place your naked baby with baby's belly against your bare chest. Cover baby's back with a blanket    Start skin-to-skin right after birth, as soon as you are ready    Skin-to-skin:  ? Helps keep baby warm  ? Improves baby's oxygen and blood sugar levels  ? Helps your uterus contract and bleed less  ? Helps baby feel calm and comforted  ? Helps you feel close to baby  ? Helps get breastfeeding started. Being close makes latching on easier, and baby may move over to the nipple and latch without help  ? Baby breastfeeds better and longer when skin-to-skin  Placing baby well for good attachment to the breast or chest    Hold your baby close with baby's tummy touching your tummy.    Wait for baby to open mouth wide, then bring baby onto the breast/chest.    Baby should take a big mouthful of breast/chest, not just the nipple. This helps baby get more milk, and the suckling should feel comfortable.    When baby is latched well to the breast/chest, nipples aren't cracked or painful.  Keeping baby near (called \"rooming-in\" at the hospital)    Baby sleeps better and cries less when birth parent is near. Your room is quiet.    We will place your baby safely on their back in their bassinette. This lets you practice safe sleep for your baby while keeping them at your bedside.    Baby feeds more often, which means your milk supply increases faster, and your baby loses less weight.    Parents have an easier time getting to know and bonding with baby.    Parents feel much more confident about baby care and breastfeeding/chestfeeding.    Maternity " "staff can help at any time.  Feeding on cue    Feeding on cue simply means feeding whenever your baby shows signs of hunger    Crying is a late hunger sign. Feed baby whenever baby wants for as long as baby wants.    Feeding signs: mouth movements, sticking the tongue out, rooting (baby turns toward chest and may open mouth), hand-to-mouth movements    Advantages of feeding on cue:  ? Frequent breastfeeding/chestfeeding in the first weeks after birth gives you a good milk supply for months to come.  ? Babies settle into a relaxing feed more quickly. Babies enjoy feedings more when they don't have to cry to be fed.  ? Feeding is comfort as well as nutrition. Newborns love constant closeness and feeding and can't be held \"too much\" or \"spoiled.\"  ? Newborns need small frequent feedings in the first days of life. Just one to three teaspoons fill a new baby's stomach.  ? Responding to feeding cues helps babies gain weight.  Feeding only human milk in the first six months    Colostrum is the first milk that baby gets at birth. The amount of colostrum matches the baby's stomach, so it will not be overfilled.    The small volumes ready at birth are also easier for baby to handle.    All babies lose weight in the first few days. This is simply \"water weight.\"    Introducing food or fluids other than human milk too early can cause problems for breastfeeding/chestfeeding and for your baby's health.    Feeding only human milk maximizes the protection against disease and infections.    Your body knows how much milk to make by how often your baby feeds. If you give your baby formula, your body may not know how much milk to make.    For informational purposes only. Not to replace the advice of your health care provider. Adapted with permission from \"Getting Started with Infant Care and Breastfeeding,\" by JANAY Trent, WILFREDO, Dasha Esteves, RN, IBCLC, Lary Bautista MD, WILFREDO, and Blessing Durham MD, IBCLC. [place] " Breastfeeding Coalition, 2017. Clinically reviewed by [facility]. Socialize [id number] - 05/19.        [place] Breastfeeding Resources     Research shows that human milk offers the best  nutrition and protection for babies. So at [place],  we care for families and babies in a way that  promotes, teaches and supports lactation. We  support all breastfeeding, chestfeeding and human  milk feeding families, as well as families who can t  breastfeed / chestfeed or who choose not to do so.  A mother or caregiver s own milk is best for a baby,  but if you are unable to breastfeed / chestfeed, we  may suggest pasteurized donor human milk for your  baby while in the hospital.    Lactation support    The following [facility] locations offer  individual outpatient lactation consults. Some  locations offer phone or group lactation consults  with certified lactation consultants. Call to confirm  services and for information and appointments. You  may wish to call your insurance company first to see  if they will cover the cost of a consult.    [facility]: [phone]    [facility]: [phone]    [facility]: [phone]  Offers [place] First Days program, which includes  group lactation visits and one free information session  about delivering your baby at a designated [facility] hospital and the importance and benefits  of breastfeeding and human milk. These group visits  also help patients with feeding concerns and offer  information about other postpartum topics.                For informational purposes only. Not to replace the advice of your health care provider. Copyright   2005 [facility]. All rights reserved. Socialize [id number] - REV 01/19         [facility] ([place]):  [phone]    [facility] ([place]):  [phone] or [phone]    [facility]  ([place]):  [phone]    [facility]  ([place]): [phone]    [facility] ([place]):  [phone] or [phone]  Includes follow-up visits for caregivers of babies  discharged from the  intensive care unit  (NICU) at [facility].    [facility] ([place]):  [phone]    [facility] ([facility],  [facility], primary care clinics):  [phone]  Also offers weight checks, feeding discussions and support with a lactation consultant as a part of free, weekly support group.    [facility]: [phone]  Also offers a free weekly support group.                                                                                                                                                                                                      (continued)   You may also call [facility] at [phone]  for information about [facility]  locations that offer lactation support. For information  about breastfeeding / chestfeeding and childbirth  classes in the [facility], go to [employer] at [email or web address] . For [facility], go to [email or web address] and click on  Classes and Events at the bottom of the page. Or, call  [phone].    Supplies    You can get breast pumps from the [facility] nurses  at [facility] or [facility]  nurses at [facility]. Call your health  insurance to see if they will cover the cost of the  pump. Tell your nurse you d like a pump. They will  help you fill out the right paperwork. The pump will  be ready for you when you leave the hospital.    If you decide to get a pump after you leave the  hospital, you can get one from our partners at  [facility]. [facility] carries a range of  feeding supplies and pumps. Please call your  health  insurance to see if they will cover the cost of the  pump. Then call [facility]  to find out what supplies and pumps are available.  Some stores may deliver the pump to your home.    [employer] [facility]:  www.[email or web address] Other lactation services    [facility]: [phone] (24 hours a day)  www.[email or web address]  Offers support for breastfeeding / chestfeeding and  human milk feeding families. Call to find a group in  your area.    National [facility]:  [phone]  www.womenshealth.gov/breastfeeding  Offers a breastfeeding / chestfeeding information  line in English and Swedish.    WIC (Women, Infants and Children) Program:  [phone]  Offers breastfeeding / chestfeeding counseling. Call  to find an office near you.    Milk banking    [facility]  [email or web address]  [phone]  Consider freezing your extra collected milk to donate  to babies in need. Email or call for information.                       If you are deaf or hard of hearing, please let us know. We provide many free services including  sign language interpreters, oral interpreters, TTYs, telephone amplifiers, note takers and written materials.

## 2021-06-17 NOTE — PATIENT INSTRUCTIONS - HE
Today hemoglobin and group B strep.    Nonstress test each visit.    Nonstress test reactive today.    Biophysical profile 8 out of 8.    Hemoglobin normal at 12.6 today.    Follow-up in 1 week.    Weekly with your visits we will check position with a handheld ultrasound because your previous pregnancies had unstable lie.    Gave patient the completed short-term disability forms.  Copy was made for the chart.    Patient Education   HEALTHY PREGNANCY CARE: 34-36 WEEKS PREGNANT    Your baby is gaining about an ounce each day, so healthy nutrition and rest are still very important. Learn about late pregnancy symptoms, such as constipation and backaches. Drinking more fluids and eating more fiber can relieve constipation. The pelvic tilt and a heating pad can ease backaches.    Continue to watch for signs and symptoms of preeclampsia:     Sudden swelling of your face, hands, or feet     New vision problems such as blurring, double vision, or flashing lights    A severe headache not relieved with acetaminophen (Tylenol)    Sharp or stabbing pain in your right or middle upper abdomen    You're almost done with your pregnancy but it's still too soon to have your baby. The goal is to have your baby after 37 weeks, so watch for signs and symptoms of premature labor:     Regular contractions. This means having about 6 or more within 1 hour, even after you have had a glass of water and are resting.     A backache that starts and stops regularly.    An increase or change in vaginal discharge, such as heavy, mucus-like, watery, or bloody discharge.     Your water breaks or leaks.    You will be tested for group B streptococcus (GBS), a type of bacteria found in 10-30% of pregnant women. A woman with GBS can pass it to her baby during delivery. Most babies who get GBS from their mothers do not have any problems, but some babies get very ill and need to be hospitalized for antibiotic treatment. Treating you with antibiotics during  "labor and delivery helps to prevent infection in your baby.    Review information on postpartum care that you will need after the baby is born.  Discuss your choices and plans for birth control with your midwife or physician.     Postpartum Care  During the days and weeks after the delivery of your baby (postpartum period), your body will change as it returns to its nonpregnant condition. As with pregnancy changes, postpartum changes are different for every woman.    Physical changes after childbirth  The changes in your body may include:    Contractions called afterpains shrink the uterus for several days after childbirth. Shrinking of the uterus to its prepregnancy size may take 6 to 8 weeks.    Sore muscles (especially in the arms, neck, or jaw) are common after childbirth. This is because of the hard work of labor and pushing your baby out. The soreness should go away in a few days.    Bleeding and vaginal discharge (lochia) may last for 2 to 8 weeks, and can come and go for about 2 months.    Vaginal soreness, including pain, discomfort, and numbness, is common after vaginal birth. Soreness may be worse if you had a perineal tear or episiotomy.    If you had a  birth (), you may have pain in your lower abdomen and may need pain medicine for 1 to 2 weeks.    Breast engorgement is common around the 3rd or 4th day after delivery, when the breasts begin to fill with milk. This can cause discomfort and swelling. If you are breast feeding, the best treatment is to feed your baby often or pump the milk out. You can also use warm compresses. If you are not breast feeding, placing ice packs on your breasts, taking a hot shower, or using warm compresses may relieve the discomfort.    Be aware of postpartum depression    \"Baby blues\" are common for the first 1 to 2 weeks after birth. You may cry or feel sad or irritable for no reason.     For some women, these feelings last longer and are more intense. " This is called postpartum depression.     If your symptoms last for more than a few weeks or you feel very depressed, ask your midwife or physician for help.     Postpartum depression can be treated. Support groups and counseling can help, but sometimes medication is needed.     Discuss follow-up appointments with your midwife or physician, as well. He or she will usually want to see you 6 weeks after the birth of your baby, sooner if you are having problems.    If you have questions about any symptoms you are experiencing or any other concerns, call your provider or their clinic staff at [facility] at Phone: [phone]. If it's after clinic hours, physician patients should call the [facility] at [phone] ([phone]); midwife patients should call their answering service at [phone].    How can you care for yourself at home?   You can refer to the Starting Out Right book or find it online at [email or web address]/images/[email or web address]://[email or web address]/[email or web address]/[email or web address]-[email or web address]-Right.pdf or http://www.[facility].org/images/stories/flipbooks/[facility]-starting-out-right/[facility]-starting-out-right.html#p=8     You can sign up for a weekly parenting e-mail that gives support, tips and advice from health care professionals that starts with pregnancy and continues through the toddler years. To register, go to [email or web address] at any time during your pregnancy.    Making Early Breastfeeding or Chestfeeding Work: What's Important?  Breastfeeding/chestfeeding is important!     It helps keep babies healthy.    Parents who breastfeed/chestfeed have lower risks of breast and ovarian cancer.    It's convenient: the milk is always ready and warm, and there is nothing to mix or prepare for feeding.    Formula is harder for your baby to digest.    It helps you bond with your baby and protects against postpartum depression.  Lots of early skin-to-skin  "contact with your baby    Place your naked baby with baby's belly against your bare chest. Cover baby's back with a blanket    Start skin-to-skin right after birth, as soon as you are ready    Skin-to-skin:  ? Helps keep baby warm  ? Improves baby's oxygen and blood sugar levels  ? Helps your uterus contract and bleed less  ? Helps baby feel calm and comforted  ? Helps you feel close to baby  ? Helps get breastfeeding started. Being close makes latching on easier, and baby may move over to the nipple and latch without help  ? Baby breastfeeds better and longer when skin-to-skin  Placing baby well for good attachment to the breast or chest    Hold your baby close with baby's tummy touching your tummy.    Wait for baby to open mouth wide, then bring baby onto the breast/chest.    Baby should take a big mouthful of breast/chest, not just the nipple. This helps baby get more milk, and the suckling should feel comfortable.    When baby is latched well to the breast/chest, nipples aren't cracked or painful.  Keeping baby near (called \"rooming-in\" at the hospital)    Baby sleeps better and cries less when birth parent is near. Your room is quiet.    We will place your baby safely on their back in their bassinette. This lets you practice safe sleep for your baby while keeping them at your bedside.    Baby feeds more often, which means your milk supply increases faster, and your baby loses less weight.    Parents have an easier time getting to know and bonding with baby.    Parents feel much more confident about baby care and breastfeeding/chestfeeding.    Maternity staff can help at any time.  Feeding on cue    Feeding on cue simply means feeding whenever your baby shows signs of hunger    Crying is a late hunger sign. Feed baby whenever baby wants for as long as baby wants.    Feeding signs: mouth movements, sticking the tongue out, rooting (baby turns toward chest and may open mouth), hand-to-mouth movements    Advantages " "of feeding on cue:  ? Frequent breastfeeding/chestfeeding in the first weeks after birth gives you a good milk supply for months to come.  ? Babies settle into a relaxing feed more quickly. Babies enjoy feedings more when they don't have to cry to be fed.  ? Feeding is comfort as well as nutrition. Newborns love constant closeness and feeding and can't be held \"too much\" or \"spoiled.\"  ? Newborns need small frequent feedings in the first days of life. Just one to three teaspoons fill a new baby's stomach.  ? Responding to feeding cues helps babies gain weight.  Feeding only human milk in the first six months    Colostrum is the first milk that baby gets at birth. The amount of colostrum matches the baby's stomach, so it will not be overfilled.    The small volumes ready at birth are also easier for baby to handle.    All babies lose weight in the first few days. This is simply \"water weight.\"    Introducing food or fluids other than human milk too early can cause problems for breastfeeding/chestfeeding and for your baby's health.    Feeding only human milk maximizes the protection against disease and infections.    Your body knows how much milk to make by how often your baby feeds. If you give your baby formula, your body may not know how much milk to make.    For informational purposes only. Not to replace the advice of your health care provider. Adapted with permission from \"Getting Started with Infant Care and Breastfeeding,\" by JANAY Trent, WILFREDO, Dasha Esteves RN, IBCLC, Lary Bautista MD, WILFREDO, and Blessing Durham MD, IBCLC. [place] [employer], 2017. Clinically reviewed by [facility]. [employer] 221738 - 05/19.        [facility] Breastfeeding Resources       Research shows that human milk offers the best  nutrition and protection for babies. So at [facility],  we care for families and babies in a way that  promotes, teaches and supports lactation. We  support all breastfeeding, " chestfeeding and human  milk feeding families, as well as families who can t  breastfeed / chestfeed or who choose not to do so.  A mother or caregiver s own milk is best for a baby,  but if you are unable to breastfeed / chestfeed, we  may suggest pasteurized donor human milk for your  baby while in the hospital.    Lactation support    The following [facility] locations offer  individual outpatient lactation consults. Some  locations offer phone or group lactation consults  with certified lactation consultants. Call to confirm  services and for information and appointments. You  may wish to call your insurance company first to see  if they will cover the cost of a consult.    [facility]: [phone]    [facility]: [phone]    [facility]: [phone]  Offers [place] First Days program, which includes  group lactation visits and one free information session  about delivering your baby at a designated [facility] and the importance and benefits  of breastfeeding and human milk. These group visits  also help patients with feeding concerns and offer  information about other postpartum topics.                For informational purposes only. Not to replace the advice of your health care  provider. Copyright   2005 [facility]. All rights reserved. [employer] 919936 - REV .   [facility] ([place]):  [phone]    [facility] ([place]):  [phone] or [phone]    [facility]):  [phone]    [facility]  ([place]): [phone]    [facility] ([place]):  [phone] or [phone]  Includes follow-up visits for caregivers of babies  discharged from the  intensive care unit  (NICU) at [facility].    [facility]):  [phone]    [facility] ([facility],  [facility], primary care clinics):  [phone]  Also offers weight checks, feeding discussions and support with a lactation consultant as a part of free, weekly support group.    [facility]: [phone]  Also offers a free weekly support group.                                                                                                                                                                                                      (continued)   You may also call [facility] at [phone]  for information about [facility] and [facility]  locations that offer lactation support. For information  about breastfeeding / chestfeeding and childbirth  classes in the [facility], go to [employer] at www.[employer]. For [facility], go to [email or web address] and click on  Classes and Events at the bottom of the page. Or, call  [phone].    Supplies    You can get breast pumps from the [facility] nurses  at [facility] or [facility]  nurses at [facility]. Call your health  insurance to see if they will cover the cost of the  pump. Tell your nurse you d like a pump. They will  help you fill out the right paperwork. The pump will  be ready for you when you leave the hospital.    If you decide to get a pump after you leave the  hospital, you can get one from our partners at  [facility]. [facility] [employer] carries a range of  feeding supplies and pumps. Please call your health  insurance to see if they will cover the cost of the  pump. Then call [facility]  to find out what supplies and pumps are available.  Some stores may deliver the pump to your home.    [facility]:  [email or web address] Other lactation services    [facility]: [phone] (24 hours a day)  www.[email or web address]  Offers support for breastfeeding /  chestfeeding and  human milk feeding families. Call to find a group in  your area.    National [facility] Information [facility]:  [phone]  www.womenshealth.gov/breastfeeding  Offers a breastfeeding / chestfeeding information  line in English and [place].    WIC (Women, Infants and Children) Program:  [phone]  Offers breastfeeding / chestfeeding counseling. Call  to find an office near you.    Milk banking    [facility]  [email or web address]  [phone]  Consider freezing your extra collected milk to donate  to babies in need. Email or call for information.                           If you are deaf or hard of hearing, please let us know. We provide many free services including  sign language interpreters, oral interpreters, TTYs, telephone amplifiers, note takers and written materials.

## 2021-06-17 NOTE — PROGRESS NOTES
Melisa presents to [facility] for an NST following a BPP at 38w2d.  She reports an uncomplicated pregnancy aside from transverse fetal lie.  Reactive NST.  BPP 8/8.  Plan to discharge patient home.  Dr. Rivera will follow-up with patient regarding plans for ECV.

## 2021-06-17 NOTE — H&P
Patient admitted to hospital today for external cephalic version by OB, thank you Dr. Baker.  Baby was transverse on ultrasound last week.  Then the plan was to do a induction for unstable fetal lie.  Baby was found to be vertex and after discussing with the patient she opted to wait and anticipate spontaneous labor.  Discussed risks and benefits with her including if baby is not vertex when she goes into labor she may need a .  Patient would like to wait for spontaneous labor and monitor the position of baby closely.  Patient has had a successful version in her past pregnancy and 2 vaginal deliveries.  I will be seeing patient in clinic in 2 days.  Harleen Rivera MD

## 2021-06-17 NOTE — PROGRESS NOTES
Patient arrived to [facility] for external cephalic version. EFM applied. Provider called and will be on unit in 5 min.

## 2021-06-17 NOTE — ANESTHESIA PREPROCEDURE EVALUATION
Anesthesia Evaluation      Patient summary reviewed   No history of anesthetic complications     Airway   Mallampati: II  Neck ROM: full   Pulmonary - negative ROS and normal exam                          Cardiovascular - negative ROS and normal exam   Neuro/Psych - negative ROS     Endo/Other    (+) obesity (morbid), pregnant     GI/Hepatic/Renal - negative ROS           Dental - normal exam                        Anesthesia Plan  Planned anesthetic: epidural    ASA 3     Anesthetic plan and risks discussed with: patient and spouse    Post-op plan: epidural analgesia and routine recovery

## 2021-06-18 NOTE — PROGRESS NOTES
Melisa is present for routine OB. UA today showed 100 mg of glucose and trace blood, GBS negative last visit, and HGB 12.6 on 6/27/18. US on 6/21/2018 showed transverse lie to maternal left side and US on 6/25/2018 showed transverse fetal lie with the fetal head to the maternal right side. During her last labor, her mucous plug fell and she started fede until rupture. On Saturday, baby was very active while she was watching a movie and she is thinking that is when baby flipped. With her last pregnancy, she went into the hospital without knowing if she had a breech pregnancy since baby had flipped frequently. She has started moxibustin treatment and will continue for 10 days. She is planning on encapsulating her placenta and she is wondering if she will need to bring any supplies for placenta. After her last delivery, she felt some postpartum depression symptoms and is hoping encapsulation will help prevent depression. She is wondering if she can follow with Dr. Thompson since she consulted with her during her last pregnancy. She is wondering what happens if she does a version closer to her due date. She is amenable to meeting with [facility] on Monday or Tuesday.     PFSH:   Social: She is working part time.     Plan:   Hemoglobin today.     Dr. Thompson's office will call you to set up version either Monday or Tuesday. (7-9-18 or 7-10-18)    Appointment on Monday 7/9 at 4:20 pm.       Physical:   Neuro: Patellar Reflexes II+      LABS (1): UA ordered and reviewed.       The visit lasted a total of 17 minutes face to face with the patient. Over 50% of the time was spent counseling and educating the patient about routine OB.    I, Maritza Wolff, am scribing for and in the presence of, Dr. Harleen Rivera.    I, Dr. Harleen Rivera MD, personally performed the services described in this documentation, as scribed by Maritza Wolff in my presence, and it is both accurate and complete.    Data  Points: 1

## 2021-06-18 NOTE — PROGRESS NOTES
Subjective: Left groin pain for 1 week. Quantifies as a 5-6 / 10.  Overall doing well.  Objective: deep tendon reflexes 2+, trace lower extremity edema.  UA with a trace of blood.  Assessment and plan: 32 weeks gestation.  Tdap shot today.  He needs a belly band if helpful for groin pain.  Follow-up in 2 weeks.  Cervical check at next visit.

## 2021-06-18 NOTE — PATIENT INSTRUCTIONS - HE
Patient Instructions by Tera Pandya LPN at 2/4/2021  8:40 AM     Author: Tera Pandya LPN Service: -- Author Type: Licensed Nurse    Filed: 2/4/2021  9:15 AM Encounter Date: 2/4/2021 Status: Addendum    : Harleen Rivera MD (Physician)    Related Notes: Original Note by Harleen Rivera MD (Physician) filed at 2/4/2021  9:06 AM       Next visit in 4 weeks.    Cervical check then.  Tdap shot next visit.    Starting at the 30-week visit we will be checking a urine sample each visit.  Starting at the 34-week visit we will start checking the cervix again.    Today we will do the 1 hour glucose, hemoglobin, syphilis, urinalysis and urine culture.    Since you will be advanced maternal age at time of delivery we will get an ultrasound with biophysical profile at 36 weeks and starting at 36 weeks we will do a nonstress test monitoring at our clinic weekly.    Patient Education   HEALTHY PREGNANCY CARE: 22-26 WEEKS PREGNANT    You are finishing your second trimester. Your baby is developing rapidly. At this stage, babies have a sense of balance, can respond to touch, and are recognizing parent voices.  Your baby will be moving around more now.  You may notice [place] contractions now, which are painless and prepare the uterus for the delivery.    Nutrition: During this time, you may find that your nausea and fatigue are gone. Overall, you may feel better and have more energy than you did in your first trimester. Be sure you are getting enough calcium and iron in your diet. Your prenatal vitamins cannot supply all of the nutrients you need, so continue to eat 3-4 servings of dairy foods and 2-3 servings of meat/fish/poultry/nuts every day. Foods high in iron include: red meats, eggs, dark green vegetables, dark yellow vegetables, nuts, kidney beans and chickpeas. Some cereals are fortified with iron, so look at the food labels for 100% of the daily requirement for iron.     Discuss your work  "situation with your midwife or physician as needed. If you stand for long periods of time, you may need to make changes and take breaks.    [place] for childbirth and parenting classes, including an infant CPR class. Breastfeeding classes are recommended too.    Plan for the gestational diabetes screening between weeks 24-28. You can eat normally before that visit; we would suggest making sure you have protein foods, but not a lot of carbohydrates or sugary foods.    Your blood type was determined at your first OB appointment. If you are Rh negative, you should discuss the need for a Rhogam shot with your midwife or physician. This would be administered around 28 weeks if necessary.    How can you care for yourself at home?   You can refer to the Starting Out Right book or find it online at [email or web address] or [email or web address]     You can sign up for a weekly parenting e-mail that gives support, tips and advice from health care professionals that starts with pregnancy and continues through the toddler years. To register, go to [email or web address] at any time during your pregnancy.    Watch for the warning signs of premature labor:   \" Dull, low backache  \" Contractions of the uterus, menstrual-like cramps  \" Abdominal cramping with or without diarrhea  \" More pelvic pressure  \" Increase or change in vaginal discharge.     Continue to watch for signs and symptoms of preeclampsia:   \" Sudden swelling of your face, hands, or feet   \" New vision problems such as blurring, double vision, or flashing lights  \" A severe headache not relieved with acetaminophen (Tylenol)  \" Sharp or stabbing pain in your right or middle upper abdomen        Remember that labor doesn't have to hurt. Never hesitate to call your midwife or physician or their staff at [facility] at Phone: [phone] for any one of these warning signs - or if something just doesn't feel right. If it's after clinic hours, physician patients should call the [facility] at [phone] ([phone]); midwife patients should call their answering service at [phone].    BREASTFEEDING TIPS FOR NEW MOMS     Importance of skin-to-skin contact:  ? Gets breastfeeding off to a good start  ? Keeps baby warm and is great for bonding  ? Provides calming effects and helps to stabilize breathing and  blood sugar  ? Helps the uterus to contract and bleed less    Importance of feeding whenever baby shows signs of  being hungry, \"feeding on cue\":  ? Helps create a good milk supply appropriate to the babys needs  ? Less breastfeeding complications such as engorgement or  low supply  ? Helps baby get enough milk  ? Milk supply is determined by how often baby nurses and empties  the breast.  ? Feeding is for comfort as well as nutrition    Importance of good latch (positioning and attaching  baby properly at breast):  ? Helps prevent sore nipples  ? Helps ensure baby gets enough milk and supports moms breast  milk supply    Risks of giving baby supplements other  than moms breastmilk  Breastfeeding alone is recommended for the first 6 months, if not it:  ? Can make baby more prone to illness  ? Can make baby less satisfied at breast (wanting larger amounts or  faster flow)  ? Reduces milk supply    Importance of rooming-in:  ? Increases parent confidence while mother is supported by the  hospital staff  ? Caregivers learn how to care for baby and recognize feeding cues  ? Enables feeding whenever baby needs to eat  ? Baby is comforted with mom and learns to recognize caregivers    Avoiding use of pacifiers:  ? Use of pacifiers in the first weeks can make it difficult to make a full  milk supply  ? May interfere with baby learning to latch well      2017 [facility] [place] 344234. SW [id number]. [facility] 11.17   "       Breastfeeding   Why Its Important and What to Expect     Your breast milk is the best food for your baby--and  breastfeeding can help you be healthy as well.    Though breastfeeding is natural, it is a learned process for both mother and baby. To prepare, there are things you can learn--and do--before your baby is born.    ? Learn the benefits of breastfeeding.    ? Understand the basic process.    ? Know what to expect in the hospital.    ? Arrange breastfeeding support for the first few  weeks after birth.    ? Take a breastfeeding class (see back page).    ? Talk to your midwife, nurse or doctor if you have  Questions.    The benefits of breastfeeding    Human milk changes to meet the needs of a growing baby. It is all a baby needs for the first six months of life.    In fact, babies who receive only human milk for the  first six months are less likely to develop colds, the  flu, colic, asthma, ear infections, food allergies and  diarrhea (loose, watery stools). They may be less likely      to be overweight as children, and they are less likely to develop diabetes later in life. Some studies also show that infants have a higher IQ if they are .    Breastfeeding can:    ? Help you and your baby develop a special bond--  and make you feel proud that you can feed your  Baby.    ? Reduce the total amount of blood you will lose  after delivery.    ? Help your uterus return to its non-pregnant size.    ? Reduce the risk of Sudden Infant Death Syndrome (SIDS).    ? Help you lose your pregnancy weight more quickly.    ? Help delay the return of your monthly periods.    ? Lower your risk of some breast and ovarian  cancers--as well as osteoporosis (bone loss)--later in life.    ? Save you more than $300 per month. (This  includes the cost of formula and medical bills.  Formula-fed babies get sick more often.)          If you are deaf or hard of hearing, please let us know. We provide many free services  including  sign language interpreters, oral interpreters, TTYs, telephone amplifiers, note takers and written materials.        How to breastfeed    Skin-to-skin contact    Hold your baby on your chest skin-to-skin right after  birth. Skin contact calms your baby, steadies their  breathing and keeps your baby warm. Your baby will be alert and will likely want to feed within the first hour after birth.    Babies are born with reflexes that help them  breastfeed. Your body will be ready with early milk  (called colostrum), so you will have all the milk your  baby needs for that first feeding. Your nurse will help you get started.    Keep your baby with you and breastfeed whenever  your baby is hungry. Offering the breast early and  often helps your body keep making lots of milk.    How to position your baby    There are many positions for breastfeeding.              No matter which position you choose, support your  babys back, shoulders and neck. The head and body should be in a straight line, and the entire body should face the breast. Your baby should be able to tilt the head back easily. Your baby shouldnt have to reach out to feed. Also make sure your babys nose is level with your nipple. This way, your baby will find it easier to attach to your breast.    Finally, get comfortable. Use pillows to support your  body. Dont lean over or slump to reach your baby.  Once your baby is attached to the breast, its okay to change your position slightly.    You will feel a bit of a tugging at first, but you should  never feel pain. If you do, ask your nurse or lactation expert for help. She will teach you how to latch your baby onto the breast in a way that feels more comfortable.    Breastfeeding in the hospital    For the first three days after birth, your body will  produce early milk called colostrum. This milk is  full of calories and antibodies to help keep your baby healthy. It is all your baby needs for the first  few days.    Remember, babies do not eat anything while inside  you. Right after birth, your baby will only need a little bit of milk (about 1 teaspoon per feeding) to get the digestive system working well. Your baby will not need any formula--your body will make the right amount of milk.    Breastfeed whenever your baby shows signs of hunger. (See next page for a list of signs.) Crying is a late sign of hunger.    Babies often lose weight in the days after birth. This  is normal. By two weeks of age, your baby should be back to their birth weight. Your care team will watch your babys weight carefully.    If you are unable to breastfeed in the hospital, your  care team may suggest pasteurized donor human milk for your baby.               The Most Important Points to Remember    ? Your breast milk is the perfect food for your  baby. Breastfeeding has a lot of health benefits  for you as well.    ? Hold your baby skin-to-skin as soon as possible  after birth. Do this for as long as you can. Even  if your baby doesnt go to the breast right away,  skin-to-skin contact helps your body make  more milk. This lets you get an early start on  Breastfeeding.    ? Learn how to position your baby at the breast.  This will help your baby feed well, and it will  keep you comfortable.    ? Feed your baby whenever your baby wants to  eat. You are feeding a baby, not a clock!    ? Signs that your baby is ready to eat include:  starting to wake up, chewing on fists, moving  the face from side to side, opening and closing  the mouth, sticking out the tongue and turning  toward the breast when held. Crying is a late  sign of hunger, so look for the earlier signals  that your baby makes.    ? Feed your baby only human milk for at least  six months. The World Health Organization  recommends breastfeeding for the first year,  noting it is a peewee baby who is  for  the first two years.    ? While in the hospital, plan to keep your  baby  with you at all times, except for certain medical  procedures. This is an important time for you  and your baby to get to know each other and  practice breastfeeding.     Common questions    Below are questions that many women have about  breastfeeding. You will find further information in the  childbirth book your care team gave you. If you have more questions, speak with your midwife, nurse or doctor.    How do I involve my partner, family and  friends and get their help and support?    Sometimes family and friends dont understand why  you want to breastfeed. Perhaps they themselves  didnt breastfeed, or they werent  as infants. Tell them about the benefits of breastfeeding and how important it is to feed only human milk for the first six months or so. If you feed often, you will make plenty of milk to help your baby grow, fight illness and get the best start possible.    After two to four weeks--once your baby is feeding  well and your milk supply is well established--your  partner and others can feed the baby your milk from  a cup, dropper or bottle. You can remove milk from  your breast (by hand or pump) and store it for later  use. This way, your baby will have your milk even  when youre away.    Remind everyone that there are lots of ways to help  that dont involve feeding: making meals, caring for  your other children, comforting the baby if they cries, changing diapers, running errands and more.    Is breastfeeding painful?    Not usually. There are ways to prevent pain and to  treat it if it happens.    The best ways to avoid pain are to feed your baby  often, use a good position and correctly latch your  baby onto the breast. These help prevent the two  most common sources of pain: sore nipples and  engorgement (overly full breasts). You will learn more about these topics in a breastfeeding class and in the hospital after your baby is born.         How much time does it take to  breastfeed?    Some new mothers feel that all they do is breastfeed. In the early weeks, a baby eats 8 to 12 times per day. Sometimes babies will cluster feedings close together. At other times, there are longer stretches between feedings.    Remember, your newborns stomach will be about  the size of a walnut. Your baby wont eat much at each feeding. If you feed your baby often in the first days, your baby--and your milk supply--will grow. Your baby will eat more at each session, and you will need to nurse less often. Within a few weeks, most women find that breastfeeding is easier and takes less time than formula feeding.    How will I know if my baby is getting  enough milk?    Your body will start making milk as soon as your  baby is born. The more often you put your baby to  breast, the more milk you will make. You should avoid pacifiers for the first several weeks until breastfeeding is well established--you want your baby to do all their sucking at the breast. This will help you make more milk.    There are signs that your baby is getting enough milk. You will learn these signs over time. For example:    ? You will count wet and soiled diapers, because  these show how much milk your baby is getting.    ? Your baby will seem satisfied after feedings.    ? Your baby will grow and gain weight after the first  few days.    Are there reasons why a woman shouldnt  breastfeed her baby?    There are a few medical concerns that prevent  breastfeeding. In mothers, these include being HIVpositive, having active or untreated TB (tuberculosis)  and using street drugs or some medicines. These  mothers usually choose infant formula for their  Babies.    A few women choose not to breastfeed for personal  reasons. But most mothers can breastfeed. If you are not sure if its okay to breastfeed, ask your care team.    Getting support    Before your baby is born, get as much information  as you can. Sign up for a breastfeeding  class. Most  childbirth classes discuss breastfeeding, but a special class will give more in-depth information.    ? In the [facility]: Go to [facility] at [employer].    ? In [facility]: Go to www.[employer].  Click on Classes-and Events at the bottom of the  page, then search for breastfeeding. Or, call [phone].    Remember, help is available from your hospital, clinic, lactation experts or online. If you need help after leaving the hospital:    ? Call your babys clinic. (If you dont have a clinic,  call [phone] and ask for a referral.)    ? Call a lactation consultant. (If you need help  finding a location that offers lactation support, call  [phone].)    ? Call [employer] (24 hours a  day) at [phone] [[phone]].    ? Call the Women, Infants and Children (WIC)  program at 1-632.180.3539.    ? Call the National Womens Health Information  Center (English and Divehi) at 1-810.774.7191 or  go to www.womenshealth.gov/breastfeeding.    ? Go to [employer].       For informational purposes only. Not to replace the advice of your health care provider. Copyright   2010 [employer]. All rights reserved. Clinically reviewed by [employer] [facility]. [employer] [id number] - REV 06/18.

## 2021-06-18 NOTE — PATIENT INSTRUCTIONS - HE
Patient Instructions by Tera Pandya LPN at 3/4/2021 10:40 AM     Author: Tera Pandya LPN Service: -- Author Type: Licensed Nurse    Filed: 3/4/2021 11:09 AM Encounter Date: 3/4/2021 Status: Addendum    : Harleen Rivera MD (Physician)    Related Notes: Original Note by Harleen Rivera MD (Physician) filed at 3/4/2021 11:02 AM       Since you will be advanced maternal age at time of delivery we will get an ultrasound with biophysical profile at 36 weeks and starting at 36 weeks we will do a nonstress test monitoring at our clinic weekly.    Tdap shot today.    Starting next visit we will have a urine sample each visit.    Follow-up in 2 weeks.    Given referral to outpatient lactation.  Hopefully they call you but if you do not give me number for the [facility] at [phone].    Can preregister anytime online at [email or web address].      Patient Education   HEALTHY PREGNANCY CARE: 26-30 WEEKS PREGNANT    You are now in your last trimester of pregnancy. Your baby is growing rapidly, can open and close her eyelids, sometimes get hiccups, and you'll probably feel her moving around more often. Your baby has breathing movements and is gaining about one ounce each day. You may notice heartburn and leg cramps. Your back may ache as your body gets used to your baby's size and length.    Discuss your work situation with your midwife or physician as needed. If you stand for long periods of time, you may need to make changes and take breaks.    Pre-register online at the hospital where your baby will be born ([email or web address])     Be aware of your baby's activity level. You may be asked to do daily fetal movement counts. Contact your midwife or physician about any decreased movement.    You may have been tested for gestational diabetes today. If you are RH negative, you may have had an additional test and a Rhogam injection.    Consider  receiving a Tdap vaccination to protect your baby from Pertussis/whooping cough.    If you are considering tubal ligation, discuss this with your midwife or physician. You will need to have an appointment with the obstetrician who will do the surgery to discuss the risks, benefits, and alternatives, and to sign the consent. This can be discussed at your next visit.    Continue to watch for any signs or symptoms of premature labor:     Regular contractions. This means having about 6 or more within 1 hour, even after you have had a glass of water and are resting.     A backache that starts and stops regularly.    An increase or change in vaginal discharge, such as heavy, mucus-like, watery, or bloody discharge.     Your water breaks or leaks.    Continue to watch for signs and symptoms of preeclampsia:     Sudden swelling of your face, hands, or feet     New vision problems such as blurring, double vision, or flashing lights    A severe headache not relieved with acetaminophen (Tylenol)    Sharp or stabbing pain in your right or middle upper abdomen    If you have any of the above symptoms or any other concerns, call your midwife or physician or their clinic staff at [facility] at Phone: [phone]. If it's after clinic hours, physician patients should call the [facility] at [phone] ([phone]); midwife patients should call their answering service at [phone].    How can you care for yourself at home?   You can refer to the Starting Out Right book or find it online at [email or web address] or [email or web address]     You can sign up for a weekly parenting e-mail that gives support, tips and advice from health care professionals that starts with pregnancy and continues through the toddler years. To register, go to  "[email or web address] at any time during your pregnancy.    BREASTFEEDING TIPS FOR NEW MOMS     Importance of skin-to-skin contact:  ? Gets breastfeeding off to a good start  ? Keeps baby warm and is great for bonding  ? Provides calming effects and helps to stabilize breathing and  blood sugar  ? Helps the uterus to contract and bleed less    Importance of feeding whenever baby shows signs of  being hungry, \"feeding on cue\":  ? Helps create a good milk supply appropriate to the babys needs  ? Less breastfeeding complications such as engorgement or  low supply  ? Helps baby get enough milk  ? Milk supply is determined by how often baby nurses and empties  the breast.  ? Feeding is for comfort as well as nutrition    Importance of good latch (positioning and attaching  baby properly at breast):  ? Helps prevent sore nipples  ? Helps ensure baby gets enough milk and supports moms breast  milk supply    Risks of giving baby supplements other  than moms breastmilk  Breastfeeding alone is recommended for the first 6 months, if not it:  ? Can make baby more prone to illness  ? Can make baby less satisfied at breast (wanting larger amounts or  faster flow)  ? Reduces milk supply    Importance of rooming-in:  ? Increases parent confidence while mother is supported by the  hospital staff  ? Caregivers learn how to care for baby and recognize feeding cues  ? Enables feeding whenever baby needs to eat  ? Baby is comforted with mom and learns to recognize caregivers    Avoiding use of pacifiers:  ? Use of pacifiers in the first weeks can make it difficult to make a full  milk supply  ? May interfere with baby learning to latch well      2017 [facility] [place] [id number].  [id number]. [facility] 11.17           Breastfeeding   Why Its Important and What to Expect     Your breast milk is the best food for your baby--and  breastfeeding can help you be healthy as well.    Though breastfeeding is natural, it is a learned process for both mother and baby. " To prepare, there are things you can learn--and do--before your baby is born.    ? Learn the benefits of breastfeeding.    ? Understand the basic process.    ? Know what to expect in the hospital.    ? Arrange breastfeeding support for the first few  weeks after birth.    ? Take a breastfeeding class (see back page).    ? Talk to your midwife, nurse or doctor if you have  Questions.    The benefits of breastfeeding    Human milk changes to meet the needs of a growing baby. It is all a baby needs for the first six months of life.    In fact, babies who receive only human milk for the  first six months are less likely to develop colds, the  flu, colic, asthma, ear infections, food allergies and  diarrhea (loose, watery stools). They may be less likely      to be overweight as children, and they are less likely to develop diabetes later in life. Some studies also show that infants have a higher IQ if they are .    Breastfeeding can:    ? Help you and your baby develop a special bond--  and make you feel proud that you can feed your  Baby.    ? Reduce the total amount of blood you will lose  after delivery.    ? Help your uterus return to its non-pregnant size.    ? Reduce the risk of Sudden Infant Death Syndrome (SIDS).    ? Help you lose your pregnancy weight more quickly.    ? Help delay the return of your monthly periods.    ? Lower your risk of some breast and ovarian  cancers--as well as osteoporosis (bone loss)--later in life.    ? Save you more than $300 per month. (This  includes the cost of formula and medical bills.  Formula-fed babies get sick more often.)          If you are deaf or hard of hearing, please let us know. We provide many free services including  sign language interpreters, oral interpreters, TTYs, telephone amplifiers, note takers and written materials.        How to breastfeed    Skin-to-skin contact    Hold your baby on your chest skin-to-skin right after  birth. Skin contact calms  your baby, steadies their  breathing and keeps your baby warm. Your baby will be alert and will likely want to feed within the first hour after birth.    Babies are born with reflexes that help them  breastfeed. Your body will be ready with early milk  (called colostrum), so you will have all the milk your  baby needs for that first feeding. Your nurse will help you get started.    Keep your baby with you and breastfeed whenever  your baby is hungry. Offering the breast early and  often helps your body keep making lots of milk.    How to position your baby    There are many positions for breastfeeding.              No matter which position you choose, support your  babys back, shoulders and neck. The head and body should be in a straight line, and the entire body should face the breast. Your baby should be able to tilt the head back easily. Your baby shouldnt have to reach out to feed. Also make sure your babys nose is level with your nipple. This way, your baby will find it easier to attach to your breast.    Finally, get comfortable. Use pillows to support your  body. Dont lean over or slump to reach your baby.  Once your baby is attached to the breast, its okay to change your position slightly.    You will feel a bit of a tugging at first, but you should  never feel pain. If you do, ask your nurse or lactation expert for help. She will teach you how to latch your baby onto the breast in a way that feels more comfortable.    Breastfeeding in the hospital    For the first three days after birth, your body will  produce early milk called colostrum. This milk is  full of calories and antibodies to help keep your baby healthy. It is all your baby needs for the first few days.    Remember, babies do not eat anything while inside  you. Right after birth, your baby will only need a little bit of milk (about 1 teaspoon per feeding) to get the digestive system working well. Your baby will not need any formula--your body  will make the right amount of milk.    Breastfeed whenever your baby shows signs of hunger. (See next page for a list of signs.) Crying is a late sign of hunger.    Babies often lose weight in the days after birth. This  is normal. By two weeks of age, your baby should be back to their birth weight. Your care team will watch your babys weight carefully.    If you are unable to breastfeed in the hospital, your  care team may suggest pasteurized donor human milk for your baby.             The Most Important Points to Remember    ? Your breast milk is the perfect food for your  baby. Breastfeeding has a lot of health benefits  for you as well.    ? Hold your baby skin-to-skin as soon as possible  after birth. Do this for as long as you can. Even  if your baby doesnt go to the breast right away,  skin-to-skin contact helps your body make  more milk. This lets you get an early start on  Breastfeeding.    ? Learn how to position your baby at the breast.  This will help your baby feed well, and it will  keep you comfortable.    ? Feed your baby whenever your baby wants to  eat. You are feeding a baby, not a clock!    ? Signs that your baby is ready to eat include:  starting to wake up, chewing on fists, moving  the face from side to side, opening and closing  the mouth, sticking out the tongue and turning  toward the breast when held. Crying is a late  sign of hunger, so look for the earlier signals  that your baby makes.    ? Feed your baby only human milk for at least  six months. The World Health Organization  recommends breastfeeding for the first year,  noting it is a peewee baby who is  for  the first two years.    ? While in the hospital, plan to keep your baby  with you at all times, except for certain medical  procedures. This is an important time for you  and your baby to get to know each other and  practice breastfeeding.     Common questions    Below are questions that many women have  about  breastfeeding. You will find further information in the  childbirth book your care team gave you. If you have more questions, speak with your midwife, nurse or doctor.    How do I involve my partner, family and  friends and get their help and support?    Sometimes family and friends dont understand why  you want to breastfeed. Perhaps they themselves  didnt breastfeed, or they werent  as infants. Tell them about the benefits of breastfeeding and how important it is to feed only human milk for the first six months or so. If you feed often, you will make plenty of milk to help your baby grow, fight illness and get the best start possible.    After two to four weeks--once your baby is feeding  well and your milk supply is well established--your  partner and others can feed the baby your milk from  a cup, dropper or bottle. You can remove milk from  your breast (by hand or pump) and store it for later  use. This way, your baby will have your milk even  when youre away.    Remind everyone that there are lots of ways to help  that dont involve feeding: making meals, caring for  your other children, comforting the baby if they cries, changing diapers, running errands and more.    Is breastfeeding painful?    Not usually. There are ways to prevent pain and to  treat it if it happens.    The best ways to avoid pain are to feed your baby  often, use a good position and correctly latch your  baby onto the breast. These help prevent the two  most common sources of pain: sore nipples and  engorgement (overly full breasts). You will learn more about these topics in a breastfeeding class and in the hospital after your baby is born.         How much time does it take to breastfeed?    Some new mothers feel that all they do is breastfeed. In the early weeks, a baby eats 8 to 12 times per day. Sometimes babies will cluster feedings close together. At other times, there are longer stretches between  feedings.    Remember, your newborns stomach will be about  the size of a walnut. Your baby wont eat much at each feeding. If you feed your baby often in the first days, your baby--and your milk supply--will grow. Your baby will eat more at each session, and you will need to nurse less often. Within a few weeks, most women find that breastfeeding is easier and takes less time than formula feeding.    How will I know if my baby is getting  enough milk?    Your body will start making milk as soon as your  baby is born. The more often you put your baby to  breast, the more milk you will make. You should avoid pacifiers for the first several weeks until breastfeeding is well established--you want your baby to do all their sucking at the breast. This will help you make more milk.    There are signs that your baby is getting enough milk. You will learn these signs over time. For example:    ? You will count wet and soiled diapers, because  these show how much milk your baby is getting.    ? Your baby will seem satisfied after feedings.    ? Your baby will grow and gain weight after the first  few days.    Are there reasons why a woman shouldnt  breastfeed her baby?    There are a few medical concerns that prevent  breastfeeding. In mothers, these include being HIVpositive, having active or untreated TB (tuberculosis)  and using street drugs or some medicines. These  mothers usually choose infant formula for their  Babies.    A few women choose not to breastfeed for personal  reasons. But most mothers can breastfeed. If you are not sure if its okay to breastfeed, ask your care team.    Getting support    Before your baby is born, get as much information  as you can. Sign up for a breastfeeding class. Most  childbirth classes discuss breastfeeding, but a special class will give more in-depth information.    ? In the [facility]: Go to [facility] [address].    ? In [facility]: Go to  www.[email or web address].  Click on Classes-and Events at the bottom of the  page, then search for breastfeeding. Or, call [phone].    Remember, help is available from your hospital, clinic, lactation experts or online. If you need help after leaving the hospital:    ? Call your babys clinic. (If you dont have a clinic,  call [phone] and ask for a referral.)    ? Call a lactation consultant. (If you need help  finding a location that offers lactation support, call  [phone].)    ? Call [employer] (24 hours a  day) at 3-316-FN-LECHE [[phone]].    ? Call the Women, Infants and Children (WIC)  program at 1-619.446.8086.    ? Call the National Womens Health Information  Center (English and Kyrgyz) at 1-395.763.2876 or  go to www.womenshealth.gov/breastfeeding.    ? Go to [employer].     For informational purposes only. Not to replace the advice of your health care provider. Copyright   2010 [employer]. All rights reserved. Clinically reviewed by [facility]. [place] [id number] - REV 06/18.

## 2021-06-18 NOTE — PROGRESS NOTES
Her blood pressure is normal today at 116/72 mmHg. She has been feeling well. She has felt regular fetal movements. She denies any frequent or persistent contractions. She has had some swelling in her hands requiring her to wear a fake wedding ring. She has also been experiencing lateral left knee arthralgia which makes it difficult to kneel on. She denies any erythema or swelling of the knee or locking and instability. She denies any injuries to the knee. She has had the arthralgia for the past 1-2 weeks. She has been experiencing right eye fasciculations as well which she had during her first pregnancy. She has been experiencing pressure in her vaginal canal lately as well. She received her tetanus vaccine at her last visit.    Recent Results (from the past 24 hour(s))   Urinalysis Macroscopic   Result Value Ref Range    Color, UA Yellow Colorless, Yellow, Straw, Light Yellow    Clarity, UA Clear Clear    Glucose, UA Negative Negative    Bilirubin, UA Negative Negative    Ketones, UA Negative Negative    Specific Gravity, UA 1.010 1.005 - 1.030    Blood, UA Trace (!) Negative    pH, UA 7.0 5.0 - 8.0    Protein, UA Negative Negative mg/dL    Urobilinogen, UA 0.2 E.U./dL 0.2 E.U./dL, 1.0 E.U./dL    Nitrite, UA Negative Negative    Leukocytes, UA Negative Negative     PHYSICAL EXAM:  General: Alert, no acute distress.  MSK: Pain experienced laterally with pressure applied to left tibia. Left MCL, LCL, ACL, and PCL tested and strong, but pain experienced over lateral left knee when testing left PCL.  Cardiac: RRR, no murmur heard.  Lungs: Clear to auscultation bilaterally.    ADDITIONAL HISTORY SUMMARIZED (2): None.  DECISION TO OBTAIN EXTRA INFORMATION (1): None.   RADIOLOGY TESTS (1): None.  LABS (1): Ordered labs.  MEDICINE TESTS (1): None.  INDEPENDENT REVIEW (2 each): None.     The visit lasted a total of 15 minutes that I spent face to face with the patient. Over 50% of the time was spent counseling and  educating the patient about her pregnancy progress.    I, Popeye Castro, am scribing for and in the presence of, Dr. Rivera.    I, Harleen Rivera MD, personally performed the services described in this documentation, as scribed by Popeye Castro in my presence, and it is both accurate and complete.    Total Data Points: 1

## 2021-06-18 NOTE — PROGRESS NOTES
She is feeling tired but good. She is accompanied with her daughter. UA today is normal. Tdap was given 5/24/2018. With her last pregnancy, she was GBS negative. She denies contractions but is feeling squirming. Edema has increased because of the heat and she has started wearing a fake ring. She was wondering when she should go to the hospital with her 2nd delivery. She is planning on [facility] for delivery. She has been icing her knee and is wearing a knee brace for knee pain. She is hoping knee pain will resolve with delivery. With her first child, she pumped and bottle fed, however, is hoping to breast feed. She is amenable to receiving an US for presentation. If baby is breech, she is planning on using moxibustin treatment to move baby. She is having a girl!     Physical:   Neuro: Patellar Reflexes II+    Plan:   Group B strep today.     Hemoglobin at next visit.     Ultrasound at [facility] tonight to see presentation.     If baby is not head down, we will decide next step.        LABS (1): UA ordered and reviewed.       The visit lasted a total of 32 minutes face to face with the patient. Over 50% of the time was spent counseling and educating the patient about routine OB.    I, Maritza Wolff, am scribing for and in the presence of, Dr. Harleen Rivera.    I, Dr. Harleen Rivera MD, personally performed the services described in this documentation, as scribed by Maritza Wolff in my presence, and it is both accurate and complete.    Data Points: 1

## 2021-06-19 NOTE — PROGRESS NOTES
Melisa is present for routine OB and is accompanied with her daughter. US on 7/9/18 showed fetal head to maternal left, UA showed trace of blood otherwise normal. GBS negative 6/21/2018, Tdap given 6/21/18, HGB 12.7 on 6/27/18. She has not felt baby move position and feels baby is transverse. She is amenable to version and is wondering if there is a way to induce labor after a successful version. She has read about cervidil to help with induction instead of pitocin use. She is feeling fetal movement, however, denies contractions or leakage of fluid. She has not noticed a change in edema. She is wondering if she will need pitocin or only need cervical ripening for an induction. She is continuing Moxibustion treatment and hoping that baby will return to vertex positioning. She is having a girl!      Plan:   For Antonina: Bring in urine sample. Sending urine cup swab a directions. Bring it in and we will check it.   Take first morning urine sample, clean off before collecting. Can keep urine in the fridge until you bring sample in.      Dr. Rivera will call you with a plan when she hears back from Dr. Thompson.  Spoke to Dr. Muñoz.  Patient is a candidate for an induction because of unstable fetal lie.  Set patient up for induction on 7/16/2018.  The nurses will do an ultrasound and if fetus is not vertex Dr. Muñoz will do a external cephalic version.  We will then start oral Cytotec and if cervix is favorable Dr. Muñoz can perform an artificial rupture of membranes.  I spoke to the patient who is agreeable to the plan.  If she goes into labor prior to our induction date she is to call labor and delivery immediately, and if availability present to labor and delivery, and let them know to call Dr. Muñoz or a partner for a version. At [facility]. Also, please page Dr. Rivera.  Patient is to call [facility] labor and delivery at 6:30 AM on July 16 and plan to present there at 7:30 AM.      Physical:      Neuro: Patellar Reflexes II+      LABS (1): UA ordered and reviewed.       The visit lasted a total of 32 minutes face to face with the patient. Over 50% of the time was spent counseling and educating the patient about routine OB.    I, Maritza Wolff, am scribing for and in the presence of, Dr. Harleen Rivera.    I, Dr. Harleen Rivera MD, personally performed the services described in this documentation, as scribed by Maritza Wolff in my presence, and it is both accurate and complete.    Data Points: 1

## 2021-06-19 NOTE — PROGRESS NOTES
Melisa is present for routine OB. UA today showed 100 mg glucose and trace blood otherwise normal. HGB 12.7 on 6/27/18, GBS negative 6/21/18, Tdap given 5/24/18. She used moxibustion treatment on 6/31/18 after US showed transverse lie and US on 7/2/18 showed single living intrauterine gestation currently in vertex presentation. She has felt increased pelvic pressure since 7/2/18. She denies feeling contractions. She feels more edema near the end of pregnancy. With her last pregnancy, she delivered at 39w 5d. She did not have pain medication with her last delivery and is hesitant about using pitocin. Position unable to be felt upon examination.     Plan:   US ordered for positioning.  Follow-up in 3 days.    Physical:   Neuro: Patellar Reflexes II+  Extremities: 1+ edema to mid shin       LABS (1): UA ordered and reviewed.       The visit lasted a total of 23 minutes face to face with the patient. Over 50% of the time was spent counseling and educating the patient about routine OB.    I, Maritza Wolff, am scribing for and in the presence of, Dr. Harleen Rivera.    I, Dr. Harleen Rivera MD, personally performed the services described in this documentation, as scribed by Maritza Wolff in my presence, and it is both accurate and complete.    Data Points: 1

## 2021-06-19 NOTE — PROGRESS NOTES
Mother  Melisa Daily  1985  Harleen Rivera MD      Infant  Name: Oliverio Daily  : 2017  PCP: Miguel                    Gestational age: 39.5  Birth weight: 8# 3 oz  DC:  7# 12 oz (5 %) (bili 11.1)  Homecare:  2018 - 7# 12 oz (bili 15.5)  MD Clinic: 2018 - 7# 13 oz (bili 15.3)  2017 = 7# 8 oz  Today:  2018 = 8# 1.6 oz - up from Tuesday weight  Mostly bottle, attempting breast with nipple shield x 2, yet still will need to bottle after  Mother noted baby is less jaundice, starting to perk up and cluster feed more, baby is using new nipple and she's a little slower but does good,   Will continue on this plan until Monday lactation fu

## 2021-06-19 NOTE — LETTER
Letter by Korey Pinto MD at      Author: Korey Pinto MD Service: -- Author Type: --    Filed:  Encounter Date: 5/23/2019 Status: (Other)           [facility]    [facility]  [address] [place] [address]  [phone]    [facility]  [address], [address]  [place] [address]  [phone]    05/28/19    Harleen Rivera MD  [address] [place] [address]    Patient: Melisa Daily  MR Number: [id number]  YOB: 1985  Date of Visit: 5/23/2019          Thank you for referring Melisa Daily to me for evaluation.  Please see attached visit note.  If you have questions, please do not hesitate to contact me.      Sincerely,    Korey Pinto MD  [facility]  [phone]  [email or web address]                    [email or web address]              Assessment:    Allergic rhinitis with sensitivity to dust mite, grass pollen, mold, weed pollen and cat    Plan:      Environmental avoidance measures  Antihistamines can be discontinued as needed.  Consider switching from Allegra to other types such as Claritin or Zyrtec.  Recommend adding fluticasone 1 spray each nostril morning and night.  Consider adding Singulair 10 mg daily    Consider allergy shots.  Information given.  ____________________________________________________________________________     Melisa comes in today for allergy evaluation.  She reports having allergy since she was a child.  She feels that they are getting worse.  She describes itchy watery eyes.  At one point she had crusting over her eyes in the morning.  They are itchy.  She has rhinorrhea and nasal congestion.  She describes sneezing.  She has a cough that she describes as a drainage cough.  Usually it a wet sounding cough.  She is worse always in the spring although does have symptoms year-round.  Also she feels that she is worse outdoors.  Also cats that seem to trigger  symptoms.  She is used Allegra year-round with some benefit.  10 years ago she had allergy testing.  She recalls being allergic to pollen, dust mite, cat and dog.    Review of symptoms:  As above, otherwise negative    Past medical history: No other chronic medical conditions noted.    Allergies: No known allergies to medications, latex, foods or hymenoptera venom    Family history: Mother with asthma.  Mother and sister with allergies.    Social history: Currently is lived in the same house for 2 years.  No visible water seepage or mold in the home.  There are 2 dogs in the home.  She reports having had dogs for 6 years.  No cigarette smoking history.    Medications: reviewed in chart    Physical Exam:  General:  Alert and in no apparent distress.  Eyes:  Sclera clear.  Ears: TMs translucent grey with bony landmarks visible. Nose: Pale, boggy mucosal membranes.  Throat: Pink, moist.  No lesions.  Neck: Supple.  No lymphadenopathy.  Lungs: CTA.  CV: Regular rate and rhythm. Extremities: Well perfused.  No clubbing or cyanosis. Skin: No rash    Last Percutaneous Allergy Test Results  Trees  Rhys, White  1:20 H  (W/F in mm): 0/0 (05/23/19 0933)  Birch Mix 1:20 H (W/F in mm): 0/0 (05/23/19 0933)  Bledsoe, Common 1:20 H (W/F in mm): 0/0 (05/23/19 0933)  Elm, American 1:20 H (W/F in mm): 0/0 (05/23/19 0933)  [place], Shagbark 1:20 H (W/F in mm): 0/0 (05/23/19 0933)  Maple, Hard/Sugar 1:20 H (W/F in mm): 0/0 (05/23/19 0933)  [place] Mix 1:20 H (W/F in mm): 0/0 (05/23/19 0933)  Wesley, Red 1:20 H (W/F in mm): 0/0 (05/23/19 0933)  Hungry Horse, American 1:20 H (W/F in mm): 0/0 (05/23/19 0933)  Naples Tree 1:20 H (W/F in mm): 0/0 (05/23/19 0933)  Dust Mites  D. Pteronyssinus Mite 30,000 AU/ML H (W/F in mm): 8/31 (05/23/19 0933)  D. Farinae Mite 30,000 AU/ML H (W/F in mm: 4/15 (05/23/19 0933)  Grasses  Grass Mix #4 10,000 BAU/ML H: 7/26 (05/23/19 0933)  Joseph Grass 1:20 H (W/F in mm): 0/0 (05/23/19 0933)  Cockroach  Cockroach  Mix 1:10 H (W/F in mm): 0/0 (05/23/19 0933)  Molds/Fungi  Alternaria Tenuis 1:10 H (W/F in mm): 8/32 (05/23/19 0933)  Aspergillus Fumigatus 1:10 H (W/F in mm): 0/0 (05/23/19 0933)  Homodendrum Cladosporioides 1:10 H (W/F in mm): 4/25 (05/23/19 0933)  Penicillin Notatum 1:10 H (W/F in mm): 0/0 (05/23/19 0933)  Epicoccum 1:10 H (W/F in mm): 0/0 (05/23/19 0933)  Weeds  Ragweed, Short 1:20 H (W/F in mm): 12/39 (05/23/19 0933)  Dock, [place] 1:20 H (W/F in mm): 4/23 (05/23/19 0933)  Lamb's Quarter 1:20 H (W/F in mm): 0/0 (05/23/19 0933)  Pigweed, Rough Red Root 1:20 H  (W/F in mm): 0/0 (05/23/19 0933)  Plantain, English 1:20 H  (W/F in mm): 0/0 (05/23/19 0933)  Sagebrush, Mugwort 1:20 H  (W/F in mm): 4/30 (05/23/19 0933)  Animal  Cat 10,000 BAU/ML H (W/F in mm): 10/23 (05/23/19 0933)  Dog 1:10 H (W/F in mm): 0/0 (05/23/19 0933)  Controls  Device Type: QUINTIP (05/23/19 0933)  Neg. control: 50% Glycerine/Saline H (W/F in mm): 0/0 (05/23/19 0933)  Pos. control: Histamine 6mg/ML (W/F in mms): 7/21 (05/23/19 0933)     Patient seen upon request of Dr. Harleen Rivera for evaluation of environmental allergies.

## 2021-06-19 NOTE — PROGRESS NOTES
Melisa is present for routine OB and is accompanied with her , Evin. UA today showed 15 ketone and trace of blood otherwise normal, HGB 12.6, GBS negative. Tdap was given on 5/24/18. US on 6/25/18 showed transverse fetal lie with fetal head to maternal right side. Version is scheduled at 12:45 pm with Dr. Thompson at [facility]. She has not felt major movement over the weekend. She has felt some more movement in her upper abdomen and is hoping that baby has flipped to vertex positioning. With her last pregnancy, she felt baby flip once even though baby flipped multiple times. She has felt some pelvic pressure, but consulted with [facility] and was told that fetus was in transverse position. She is wondering if she should be having size checked at her next US. She denies size concerns at 20 week US. She is having a girl!     Physical:   Neuro: Patellar Reflexes II+      Plan:   Version scheduled at 1245pm today. If needed.    Ultrasound ordered for positioning.         LABS (1): UA ordered and reviewed.       The visit lasted a total of 17 minutes face to face with the patient. Over 50% of the time was spent counseling and educating the patient about routine OB.    I, Maritza Wolff, am scribing for and in the presence of, Dr. Harleen Rivera.    I, Dr. Harleen Rivera MD, personally performed the services described in this documentation, as scribed by Maritza Wolff in my presence, and it is both accurate and complete.    Data Points: 1

## 2021-06-20 NOTE — PROGRESS NOTES
Assessment:   1. Left knee pain  XR Knee Left 1 or 2 VWS       Plan:   Patient Instructions   Recommend icing 20 minutes 4 times a day for 2-4 weeks.    Try not to kneel on left knee.    MRI if worse, if persists over 4 weeks, or if you wish.    Knee x-ray will be read by radiology.    Flu shot today.          Subjective:  Chief Complaint   Patient presents with     Knee Pain     c/o left knee pain       Melisa Daily, a 33 y.o. year old, comes in to clinic with complaints of left knee pain.  This started at about her 32nd week of pregnancy which was 4-1/2 months ago.  She complains of pain with kneeling on her left knee on the ground.  She would rate at is a 7 out of 10 at its worst.  It has not gotten any better after the delivery of her child 10 weeks ago.  It does not seem to hurt when she walks but just when she is kneeling.  She is done some icing and wearing a knee brace on and off.  It has helped a little bit.  No locking catching or instability.  No redness warmth or swelling.  No injury.  She complains of pain in the left lateral knee with kneeling.  She crosses her right leg over her left that does hurt the left knee also.  No other concerns.    Current Outpatient Prescriptions   Medication Sig     CHOLECALCIFEROL, VITAMIN D3, (VITAMIN D3 ORAL) Take 5,000 Units by mouth.      norethindrone (ZEYAD) 0.35 mg tablet Take 1 tablet (0.35 mg total) by mouth daily.     OMEGA-3/DHA/EPA/FISH OIL (FISH OIL-OMEGA-3 FATTY ACIDS) 300-1,000 mg capsule Take 1 g by mouth daily.     prenatal vitamin iron-folic acid 27mg-0.8mg (PRENATAL S) 27 mg iron- 800 mcg Tab tablet Take 1 tablet by mouth daily.     sertraline (ZOLOFT) 25 MG tablet Take 1 tablet (25 mg total) by mouth daily.     nipple ointment all purpose - [facility] Pharm[facility] Apply topically as needed for irritation. Apply to nipples sparingly after each feeding. Do not wash or wipe off.       Patient Active Problem List   Diagnosis     Hyperlipidemia     Allergies      Family history of hypertrophic cardiomyopathy     Pregnancy, incidental     Unstable fetal lie     Unstable lie of fetus     Pregnant     Normal vaginal delivery     Postpartum anxiety   a    Objective:  /75 (Patient Site: Left Arm, Patient Position: Sitting, Cuff Size: Adult Regular)  Pulse 63  Resp 14  Wt 197 lb 2 oz (89.4 kg)  BMI 32.8 kg/m2  General: No apparent distress  Musculoskeletal:  Pain to palpation of left lateral joint line and medial proximal tibea, ACL, PCL, MCL and PCL tested and strong. Negative McMurrays. No pain to palpation of medial joint line. No redness, warmth or effusion.    Knee x-ray: negative

## 2021-06-20 NOTE — PROGRESS NOTES
Assessment:        Melisa Daily is a 33 y.o. female here for postpartum exam at 5 1/2 weeks postpartum. Pap smear done at today's visit.   1. Postpartum care and examination  Gynecologic Cytology (PAP Smear)   2. Postpartum anxiety     3. Atypical nevi  Ambulatory referral to Dermatology       Plan:         Patient Instructions   Recommend counseling. Zoloft to start at 25 mg daily.     Wrote note to be off work with postpartum anxiety for 10 weeks after delivery. Off until Sept. 26.  She will be applying for short-term disability of which I medically recommend.    Will follow-up in 3 weeks with baby check.    Start Progesterone birth control pill. Backup protection for 1 month.    Derm consult.    [facility]:   [facility] [phone]  [address] [place] [address]     [facility]: [phone]  [address], [place] [address]    [facility]: [phone]  [address], [place] [address]    [facility]:  [phone]   [address] [place] [address]    [facility]:  [facility] [phone]   [address]    [place]:  [facility]: [phone]  [address]     [facility]:  [facility]  [phone]  [address] [place] [address]    [facility] [phone]    [address], [facility]  [address]    [facility] /[phone]   [address] [place] [address], [address]    [facility] [phone] [address], [address]    [facility] [phone]   [facility] [address]        Subjective:       Melisa Daily is a 33 y.o. female who presents for a postpartum visit. She is 5 weeks postpartum following a spontaneous vaginal delivery. I have fully reviewed the prenatal and intrapartum course. The delivery was at 39 5/7 gestational weeks. Outcome: spontaneous vaginal  delivery. Postpartum course has been ok, but see later in note. Baby's course has been ok, except problems with latch. Baby is feeding by breast. Bled for  2-3 weeks postpartum.  Currently bleeding  no bleeding. Bowel function is normal, except loose stools in the am with stress. Bladder function is normal. Patient has not resumed intercourse. Contraception method is none. Postpartum depression screening score: 8. Feels panic or anxiety in am, then ok in day. No suicidal or homicidal ideation. Was on Zoloft for  1 year after birth of first child. Baby just had tongue tie release, lip tie release and buchal release at [facility] dentist 2 days ago. Pink nipples, not cracked or itchy. Using APNO. Baby doing craniosacral therapy and chiropractic. Seeing lacation.  Baby is having a hard time latching.  Mom is pumping breastmilk with adequate supply and baby is bottling well.        The following portions of the patient's history were reviewed and updated as appropriate: allergies, current medications and problem list    Review of Systems  General:  Denies problem  Eyes: Denies problem  Ears/Nose/Throat: Denies problem  Cardiovascular: Denies problem  Respiratory:  Denies problem  Gastrointestinal:  Denies problem, Genitourinary: Denies problem  Musculoskeletal:  Denies problem  Skin: Denies problem  Neurologic: Denies problem  Psychiatric: Denies problem  Endocrine: Denies problem  Heme/Lymphatic: Denies problem   Allergic/Immunologic: Denies problem          Objective:         Vitals:    08/23/18 1412   BP: 116/72   Pulse: 76   Resp: 16   Temp: (!) 96.1  F (35.6  C)   TempSrc: Oral   Weight: 204 lb 6 oz (92.7 kg)       Physical Exam:  General Appearance: Alert, cooperative, no distress, appears stated age  Head: Normocephalic, without obvious abnormality, atraumatic  Eyes: PERRL, conjunctiva/corneas clear, EOM's intact  Ears: Normal TM's and external ear canals, both ears  Nose: Nares normal, septum midline,mucosa normal, no  drainage  Throat: Lips, mucosa, and tongue normal; teeth and gums normal  Neck: Supple, symmetrical, trachea midline, no adenopathy;  thyroid: not enlarged, symmetric, no tenderness/mass/nodules; no carotid bruit or JVD  Back: Symmetric, no curvature, ROM normal, no CVA tenderness  Lungs: Clear to auscultation bilaterally, respirations unlabored  Breasts: No breast masses, tenderness, asymmetry, or nipple discharge.  Heart: Regular rate and rhythm, S1 and S2 normal, no murmur, rub, or gallop, Abdomen: Soft, non-tender, bowel sounds active all four quadrants,  no masses, no organomegaly  Pelvic:Normally developed genitalia with no external lesions or eruptions. Vagina and cervix show no lesions, inflammation, discharge or tenderness. No cystocele, No rectocele. Uterus lemon size.  No adnexal mass or tenderness.    Extremities: Extremities normal, atraumatic, no cyanosis or edema  Skin: Skin color, texture, turgor normal, no rashes or lesions  Lymph nodes: Cervical, supraclavicular, and axillary nodes normal  Neurologic: Normal

## 2021-06-21 NOTE — LETTER
Letter by Harleen Rivera MD at      Author: Harleen Rivera MD Service: -- Author Type: --    Filed:  Encounter Date: 4/2/2021 Status: (Other)         April 9, 2021     Patient: Melisa Daily   YOB: 1985   Date of Visit: 4/2/2021       To Whom It May Concern:    Regarding the Ultrasound from 9/29/2020 that was done for the patient, there was an incorrect diagnosis linked to that procedure. The correct diagnosis for that procedure should be Supervision of pregnancy with grand multiparity [O09.40]. This was a dating ultrasound that was done due to unknown length of her cycle and unknown last menstrual period. Please consider rebilling this claim that has gone to collections  with correct diagnosis code.   If you have any questions or concerns, please don't hesitate to call.    Sincerely,        Electronically signed by Harleen Rivera MD

## 2021-06-21 NOTE — LETTER
Letter by Harleen Rivera MD at      Author: Harleen Rivera MD Service: -- Author Type: --    Filed:  Encounter Date: 5/13/2021 Status: (Other)       Melisa Daily  1985      5-    To whom it may concern:    This patient is having a hard time with insurance coverage of her OB ultrasound from December 18, 2020.  The OB ultrasound was ordered for vaginal bleeding but would be considered a routine ultrasound for anybody who has bleeding in pregnancy.  Please feel free to contact me with any questions or concerns.      Sincerely,        Harleen Rivera MD

## 2021-06-21 NOTE — LETTER
Letter by Harleen Rivera MD at      Author: Harleen Rivera MD Service: -- Author Type: --    Filed:  Encounter Date: 4/1/2021 Status: (Other)         Melisa Daily  1985    4-1-21    Name: Melisa Daily  YOB: 1985    Prescription for double electric breast pump and supplies.     Length of need: Lifetime    Diagnosis: Z39.1    NPI # [id number]    Harleen Rivera MD

## 2021-06-24 NOTE — PATIENT INSTRUCTIONS - HE
You were seen today for sinus congestion and/or pain. This is likely due to a viral illness. If symptoms worsening in 3 days such as fever, worsening headaches, or a facial rash may start the antibiotics. If you start the antibiotics, take for the full course even if symptoms completely improve.    Symptoms management:  - May use Tylenol or Ibuprofen for discomfort and/or fever if present  - May try saline irrigation to relieve congestion (see instructions below)  - Use of nasal steroids (Flonase) as prescribed  - If you are experiencing ear fullness, may try an oral decongestant such as sudafed    Reasons to come back for re-evaluation:  - Develop a fever of 100.4F or current fever worsens  - Sudden and severe pain in the face and head  - Troubles seeing or double vision  - Swelling or redness around one or both eyes  - Sfiff neck  - Symptoms have not improved after 7 days    Buffered normal saline nasal irrigation   The benefits   1. Saline (saltwater) washes the mucus and irritants from your nose.   2. The sinus passages are moisturized.   3. Studies have also shown that a nasal irrigation improves cell function (the cells that move the mucus work better).   The recipe   Use a one-quart glass jar that is thoroughly cleansed.   You may use a large medical syringe (30 cc), water pick with an irrigation tip (preferred method), squeeze bottle, or Neti pot. Do not use a baby bulb syringe. The syringe or pick should be sterilized frequently or replaced every two to three weeks to avoid contamination and infection.   Fill with water that has been distilled, previously boiled, or otherwise sterilized. Plain tap water is not recommended, because it is not necessarily sterile.   Add 1 to 1  heaping teaspoons of pickling/festus salt. Do not use table salt, because it contains a large number of additives.   Add 1 teaspoon of baking soda (pure bicarbonate).   Mix ingredients together, and store at room temperature. Discard  after one week.   You may also make up a solution from premixed packets that are commercially prepared specifically for nasal irrigation.   The instructions   Irrigate your nose with saline one to two times per day.   If you have been told to use nasal medication, you should always use your saline solution first. The nasal medication is much more effective when sprayed onto clean nasal membranes, and the spray will reach deeper into the nose.   Pour the amount of fluid you plan to use into a clean bowl. Do not put your used syringe back into the storage container, because it contaminates your solution.   You may warm the solution slightly in the microwave, but be sure that the solution is not hot.   Bend over the sink (some people do this in the shower) and squirt the solution into each side of your nose, aiming the stream toward the back of your head, not the top of your head. The solution should flow into one nostril and out of the other, but it will not harm you if you swallow a little.   Some people experience a little burning sensation the first few times they use buffered saline solution, but this usually goes away after they adapt to it.

## 2021-06-24 NOTE — TELEPHONE ENCOUNTER
Refill Given    Refill given per Policy, patient informed they are overdue for Office Visit or physical    Lidia Flynn, [facility] Triage/Med Refill 2/12/2019    Requested Prescriptions   Pending Prescriptions Disp Refills     sertraline (ZOLOFT) 25 MG tablet [Pharmacy Med Name: SERTRALINE 25MG TABLETS] 90 tablet 0     Sig: TAKE 1 TABLET BY MOUTH DAILY    [facility] Refill Protocol  Passed - 2/10/2019  8:55 AM       Passed - PCP or prescribing provider visit in last year    Last office visit with prescriber/PCP: 9/26/2018 Harleen Rivera MD OR same dept: 9/26/2018 Harleen Rivera MD OR same specialty: 9/26/2018 Harleen Rivera MD  Last physical: 2/9/2015 Last MTM visit: Visit date not found   Next visit within 3 mo: Visit date not found  Next physical within 3 mo: Visit date not found  Prescriber OR PCP: Harleen Rivera MD  Last diagnosis associated with med order: There are no diagnoses linked to this encounter.  If protocol passes may refill for 12 months if within 3 months of last provider visit (or a total of 15 months).

## 2021-06-24 NOTE — PROGRESS NOTES
Assessment:       Sinus congestion      Plan:       Trial of nasal steroids  Sudafed  Printed prescription for Augmentin to be started if no symptoms improvement or worsening symptoms in 3 days.  Nasal saline rinses as needed for congestion.  Fluids and humidifiers.  Discussed signs of worsening infection and when to follow-up with PCP if no symptom improvement.       Patient Instructions   You were seen today for sinus congestion and/or pain. This is likely due to a viral illness. If symptoms worsening in 3 days such as fever, worsening headaches, or a facial rash may start the antibiotics. If you start the antibiotics, take for the full course even if symptoms completely improve.    Symptoms management:  - May use Tylenol or Ibuprofen for discomfort and/or fever if present  - May try saline irrigation to relieve congestion (see instructions below)  - Use of nasal steroids (Flonase) as prescribed  - If you are experiencing ear fullness, may try an oral decongestant such as sudafed    Reasons to come back for re-evaluation:  - Develop a fever of 100.4F or current fever worsens  - Sudden and severe pain in the face and head  - Troubles seeing or double vision  - Swelling or redness around one or both eyes  - Sfiff neck  - Symptoms have not improved after 7 days    Buffered normal saline nasal irrigation   The benefits   1. Saline (saltwater) washes the mucus and irritants from your nose.   2. The sinus passages are moisturized.   3. Studies have also shown that a nasal irrigation improves cell function (the cells that move the mucus work better).   The recipe   Use a one-quart glass jar that is thoroughly cleansed.   You may use a large medical syringe (30 cc), water pick with an irrigation tip (preferred method), squeeze bottle, or Neti pot. Do not use a baby bulb syringe. The syringe or pick should be sterilized frequently or replaced every two to three weeks to avoid contamination and infection.   Fill with  water that has been distilled, previously boiled, or otherwise sterilized. Plain tap water is not recommended, because it is not necessarily sterile.   Add 1 to 1  heaping teaspoons of pickling/festus salt. Do not use table salt, because it contains a large number of additives.   Add 1 teaspoon of baking soda (pure bicarbonate).   Mix ingredients together, and store at room temperature. Discard after one week.   You may also make up a solution from premixed packets that are commercially prepared specifically for nasal irrigation.   The instructions   Irrigate your nose with saline one to two times per day.   If you have been told to use nasal medication, you should always use your saline solution first. The nasal medication is much more effective when sprayed onto clean nasal membranes, and the spray will reach deeper into the nose.   Pour the amount of fluid you plan to use into a clean bowl. Do not put your used syringe back into the storage container, because it contaminates your solution.   You may warm the solution slightly in the microwave, but be sure that the solution is not hot.   Bend over the sink (some people do this in the shower) and squirt the solution into each side of your nose, aiming the stream toward the back of your head, not the top of your head. The solution should flow into one nostril and out of the other, but it will not harm you if you swallow a little.   Some people experience a little burning sensation the first few times they use buffered saline solution, but this usually goes away after they adapt to it.          Subjective:       Melisa Daily is a 33 y.o. female who presents for evaluation of possible sinus infection. Symptoms include sinus congestion, cough, rhinorrhea, ear fullness, chills, and headaches. Onset of symptoms was 1 week ago, unchanged since that time.  She is drinking plenty of fluids.  Patient denies fevers. Medications used for treatment include afrin and nyquil  with some relief.    The following portions of the patient's history were reviewed and updated as appropriate: allergies, current medications and problem list.    Review of Systems  Pertinent items are noted in HPI.    Allergies  Allergies   Allergen Reactions     Ragweed Pollen Other (See Comments)     Runny nose, itchy eyes, sinus congestion, seasonal symptoms.     Hydrocodone-Acetaminophen Nausea And Vomiting         Objective:       /81 (Patient Site: Right Arm, Patient Position: Sitting, Cuff Size: Adult Large)   Pulse 80   Temp 98.6  F (37  C) (Oral)   Resp 16   Wt 191 lb 2 oz (86.7 kg)   SpO2 97%   BMI 31.80 kg/m    General appearance: alert, appears stated age, cooperative, no distress and non-toxic  Head: Normocephalic, without obvious abnormality, atraumatic, sinuses nontender to percussion  Ears: TM's intact with mucoid fluid and bulging, no erythema; external ears normal  Nose: no discharge  Throat: post-nasal drip seen; no tonsil swelling, erythema, or exudate; MMM, lips and tongue normal  Neck: no adenopathy and supple, symmetrical, trachea midline  Lungs: clear to auscultation bilaterally and no rhonchi, rales, or wheezing  Heart: regular rate and rhythm, S1, S2 normal, no murmur, click, rub or gallop

## 2021-06-25 ENCOUNTER — COMMUNICATION - HEALTHEAST (OUTPATIENT)
Dept: FAMILY MEDICINE | Facility: CLINIC | Age: 36
End: 2021-06-25

## 2021-06-25 DIAGNOSIS — D62 ANEMIA DUE TO BLOOD LOSS, ACUTE: ICD-10-CM

## 2021-06-25 NOTE — TELEPHONE ENCOUNTER
RN cannot approve Refill Request    RN can NOT refill this medication PCP messaged that patient is overdue for Office Visit.   sertraline (ZOLOFT) 25 MG tablet 30 tablet 0 2/12/2019     Sig: TAKE 1 TABLET BY MOUTH DAILY    Sent to pharmacy as: sertraline (ZOLOFT) 25 MG tablet    Notes to Pharmacy: Patient is due for physical before next refill due            Nadeen Mendoza, [facility] Triage/Med Refill 3/18/2019    Requested Prescriptions   Pending Prescriptions Disp Refills     sertraline (ZOLOFT) 25 MG tablet [Pharmacy Med Name: SERTRALINE 25MG TABLETS] 30 tablet 0     Sig: TAKE 1 TABLET BY MOUTH DAILY    [facility] Refill Protocol  Passed - 3/14/2019  1:00 PM       Passed - PCP or prescribing provider visit in last year    Last office visit with prescriber/PCP: 9/26/2018 Harleen Rivera MD OR same dept: 9/26/2018 Harleen Rivera MD OR same specialty: 9/26/2018 Harleen Rivera MD  Last physical: 2/9/2015 Last MTM visit: Visit date not found   Next visit within 3 mo: Visit date not found  Next physical within 3 mo: Visit date not found  Prescriber OR PCP: Harleen Rivera MD  Last diagnosis associated with med order: 1. Anxiety  - sertraline (ZOLOFT) 25 MG tablet [Pharmacy Med Name: SERTRALINE 25MG TABLETS]; TAKE 1 TABLET BY MOUTH DAILY  Dispense: 30 tablet; Refill: 0    If protocol passes may refill for 12 months if within 3 months of last provider visit (or a total of 15 months).

## 2021-06-25 NOTE — TELEPHONE ENCOUNTER
Forms Request  Name of form/paperwork: GORD[employer]  Have you been seen for this request: Yes:  Last seen on 05.20.21  Do we have the form: Yes- Form was faxed into the clinic, I put in out guide and gave to Tera.  When is form needed by: N/A  How would you like the form returned: Fax:  to [place] [employer] at [phone]  Patient Notified form requests are processed in 3-5 business days: No    Okay to leave a detailed message? Yes

## 2021-06-25 NOTE — TELEPHONE ENCOUNTER
I have completed these forms already.  I am seeing her baby this afternoon but we double booked and only have time for the baby today.  Would you see if she be willing to add on a virtual visit at the end of the day?  they can go home after the well child and I can call her later.  If she needs any blood work I can do that while she is here with her daughter as a lab only but Iwould like to have a separate visit with her about her postpartum hemorrhage, FMLA paperwork and any other issues for her.  Thank you.

## 2021-06-25 NOTE — PROGRESS NOTES
"[facility] Lactation Visit     Assessment:     1.  difficulty in feeding at breast         Emily is doing very well. She is gaining weight appropriately, 1.8 oz/day over the past 5 days. She transferred 2.3 oz at the breast today which is appropriate for her age. She is latching well to the breast and mom is not having significant pain. Mom, Melisa, has developed an oversupply due to consistent pumping after nursing - discussed that this is a good sign that her efforts to increase supply have paid off - however maintaining an oversupply puts her at increased risk for clogged ducts and mastitis. Recommended backing off on pumping and gradually stopping. I don't believe Emily requires ongoing supplementation at this point.   She will be seen by PCP tomorrow, recommended follow up with lactation just as needed.      Plan:        Patient Instructions      Continue to breastfeed on demand, at least 8-12 times a day.      Offer both sides every time, and alternate which breast you start on. Latch baby deeply by making a \"breast sandwich,\" and aim your nipple for the roof of the mouth. If baby's lips are rolled inward, flip the top lip out with your finger, and then apply gentle downward pressure to the chin to help the lips flange out like \"fish lips.\" If you have pain that lasts beyond the initial latch-on, always restart. When sucking/swallowing frequency starts to slow down, do breast compressions/massage and tickle baby's feet to keep her alert with feeding. A diaper change between sides can be helpful to keep her alert.     Supplementation plan: Based on her recent weight gain, I do not think she needs routine supplementation.      Recommended to pump: Slowly wean off of pumping - first shorten pump session and then stop so that your milk supply has a chance to down-regulate. Many women like keeping one pump session per day after nursing, but this is not necessary. If you experience a clogged " duct, pump a little more to clear the clog (see other strategies below to treat a clogged duct) and then go back to gradually weaning from pumping after this has been resolved.      Continue to monitor output, expect at least 6 wet diapers per day.      Vitamin D is essential for healthy bone growth and immune function.     We recommend that all breast fed babies take 400 international unit(s) of vitamin D daily. This is available over the counter either in a concentrated drop or 1 mL dose- read the instructions so you know you are giving the correct dose.      If it is hard to remember to give the vitamin D, moms can take a supplement themselves to ensure that adequate amounts transfer through breast milk. Mom's dose would be 6,400 international unit(s)/day. It is not a bad idea to ask your doctor to check your level, especially if this has never been done before, so that you are confident that you are taking the correct amount for YOU.      Based on her weight, Emily needs 2.5 - 3 oz feeding (8 times per day) for good growth at this point.      Follow up - 1 day with PCP, with lactation as needed      Average Infant Milk Intake by Age     Age Average milk volume per feeding (mL) Average milk volume per feeding (oz) Average 24 hour milk intake (mL) Average 24 hour milk intake (oz)   Day 1 Few drops - 5mL < tsp Up to 30 mL Up to 1 oz   Day 2 5 - 15 mL <0.5 oz - 1 TB 30 - 120 mL 1 - 4 oz   Day 3 15 - 30 mL  0.5 - 1 oz 120 - 240 mL 4 - 8 oz   Day 4 30 - 45 mL  1 - 1.5 oz 240 - 360 mL 8 - 12 oz   Day 5-7 45 - 60 mL 1.5 - 2 oz 360 - 600 mL 12 - 18 oz   Week 2-3 60 - 90 mL 2 - 3 oz 450 - 750 mL 15 - 25 oz   Months 1-6 90 - 150 mL 3 - 5 oz 750 - 1035 mL 25 - 35 oz      Emily transferred 2.3 oz which is appropriate for her age.      Clogged ducts can be painful and if not relieved can become infected, causing mastitis.      Strategies to relieve a clogged duct:      -Massage over the area, ideally while pumping or  "nursing. Alternate between massage at the clogged area closer to your nipple to break the clog up, and then massage from \"behind\" the clog towards your nipple to try to release the clog.  -Nurse frequently on the affected side, and move baby around into different positions   -Pump frequently on the affected side or hand express  -A warm shower or bath - epsom salts in the bath can help. Do hand expression/massage while bathing  -Vibration over the area, like with an electric toothbrush  -A haakaa pump - either on its own while nursing or pumping on the other side, or filled with warm water and epsom salts  -\"Dangle pump\" - let gravity help you release the clog  -\"Breast lift\" - this is where you lay on your back for up to 40 minutes (watch a TV show!) And gently lift your breast into the air, rotating where you hold it. This is similar to elevating a sprained ankle and will help reduce the swelling   -Warmth (with a heat pack, rice sock etc) BEFORE and WHILE nursing or pumping, and ice AFTER nursing or pumping. Ice will help cut down on the inflammation.   -Take ibuprofen to help reduce inflammation   -Sunflower lecithin can help treat a clogged duct while you're experiencing it, or reduce the likelihood of recurrent clogged ducts. The recommended dose for recurrent plugged ducts is 7577-5841 mg lecithin per day, or one 1200 mg capsule 3-4 times per day. Once the clog has been relieved and things are going well you can gradually reduce the daily dose as tolerated.   -Avoid tight, restrictive clothing - sometimes spaghetti straps can cause a clog to develop.   -Look for a \"bleb\" on the nipple (these are often painful and look like white heads) soaking the area and gently exfoliating the area with a washcloth can open the bleb.   -A significant other can attempt to suck out the clog as they are more likely to have success than a baby or a pump (greater suction power). This is not for everyone but often a successful " option.         A clogged area may continue to be tender for a few days even after the clog has been relieved.      Call your provider if you develop signs of mastitis - chills, body aches, fever accompanied by a clogged duct that is firm, warm and tender.                   Return in about 1 day (around 6/3/2021) for with PCP, additional lactation support as needed .        SUBJECTIVE:      Emily is here today with mom, Melisa, for lactation support. She is a 11 days female born at Gestational Age: 39w4d now 11 days.    She is doing well. She has gained 9 oz since last visit 5 days ago. She has gained approximately 1.8 oz per day over the past 5 days and is now -1% from birth weight.         Baby is nursing every 2-4 hours for about 12 - 40 minutes per session. He nurses on both sides most of the time, sometimes in the middle of the night she only nurses on one side at a time.   Mother reports hearing audible swallows.   Baby feeds about 10-11 times in 24 hours.   Baby is supplemented with expressed breast milk, about 35 ml after feeds (approximately 4-5 times per day). Parents give this when she's still rooting after nursing on both sides  Mom is also pumping about 4-6 times per day per day and gets about 3-4 oz per pumping session.  Number of wet diapers in 24 hours: 8+  Number of stools in 24 hours: 6  Color and consistency of stools: yellow, seedy  Mom noticed her breasts grew larger and areolas darkened during pregnancy and she noticed primary engorgement when her milk came in on 5/27.      Breastfeeding Goals: Exclusively breastfeeding, nursing when with baby, pumping at work.      Previous Breastfeeding Experience: Latching difficulties as infant, mom exclusively pumped for 12 months with one, 6 months with another - made enough milk but used galactogogues and frequent pumping to maintain supply.      Breast-surgery: No     Maternal medications: Tylenol, ibuprofen, colace, iron, PNV, claritin, vit D 2000  international unit(s)/day     Maternal Health conditions: Hemorrhage post partum, transverse positioning intermittently      Hospital course:  Low blood sugar, use gel and of donor milk, otherwise smooth hospital stay.            Results for orders placed or performed in visit on 05/27/21   Bilirubin, Panel   Result Value Ref Range     Bilirubin, Direct 0.4 <=0.5 mg/dL     Bilirubin, Indirect 11.8 (H) 0.0 - 6.0 mg/dL     Bilirubin, Total 12.2 (H) 0.0 - 6.0 mg/dL      No current outpatient medications on file.  History reviewed. No pertinent past medical history.  History reviewed. No pertinent surgical history.        Family History   Problem Relation Age of Onset     Hypertension Maternal Grandmother           Copied from mother's family history at birth     Stroke Maternal Grandmother           Copied from mother's family history at birth     Asthma Maternal Grandmother           Copied from mother's family history at birth     Depression Maternal Grandmother           Copied from mother's family history at birth     Coronary artery disease Maternal Grandmother           Copied from mother's family history at birth     Depression Maternal Grandfather           Copied from mother's family history at birth     Coronary artery disease Maternal Grandfather           Copied from mother's family history at birth     Hypertension Maternal Grandfather           Copied from mother's family history at birth     Skin cancer Maternal Grandfather           Copied from mother's family history at birth     Mental illness Mother           Copied from mother's history at birth            Primary care provider: Harleen Rivera MD     OBJECTIVE:     Mother:   Nipples are everted, the areola is compressible, the breast is soft and full.      Sore nipples: Some soreness in the afternoon, no cracks or bleeding. Pumping is comfortable.    Maternal depression screening: Doing well  EPDS: Referral to maternal PCP not made     Infant:       Age today: 11 days     There were no vitals filed for this visit.        Weight:       Wt Readings from Last 3 Encounters:   06/02/21 9 lb 1.5 oz (4.125 kg) (85 %, Z= 1.04)*   05/28/21 8 lb 8.2 oz (3.861 kg) (81 %, Z= 0.88)*   05/27/21 8 lb 11.5 oz (3.955 kg) (87 %, Z= 1.12)*      * Growth percentiles are based on WHO (Girls, 0-2 years) data.         Birthweight:  9 lb 2.4 oz (4.15 kg).   Today's weight:    Vitals       Vitals:     06/02/21 0942   Weight: 9 lb 1.5 oz (4.125 kg)      . This is -1% from birth weight.         Test weights:     Lactation scale pre-feeding weight: 9 lb 0.3 oz      LEFT side: 1.1 oz  RIGHT side: 1.2 oz     TOTAL transfer:  2.3 oz        Feeding assessment:      Digital suck assessment:  Infant draws consultant's finger into mouth, palate intact, tongue over gums, normal frenulum.      Baby can hold suction with tongue while at the breast.      Alignment: Baby's head was aligned with its trunk. Baby did face mother. Baby was in football position today.      Areolar Grasp: Baby was able to open mouth wide. Baby's lips were not pursed. Baby's lips did flange outward. Tongue was visible just barely over bottom lip. Baby had complete seal.      Areolar Compression: Baby made rhythmic motion. There were no clicking or smacking sounds. There was no severe nipple discomfort.  Nipples appeared round after feeding.     Audible swallowing: Baby made quiet sounds of swallowing: There was an increase in frequency after milk ejection reflex. The milk ejection reflex is appropriate and milk supply appears adequate.      PHYSICAL EXAM     Gen: Alert, no acute distress.   Head: Anterior fontanelle flat and soft.   Mouth:Lips pink. Oral mucosa moist. Tongue midline (good lateralization, movement, and lift; able to extend pass lower gumline).  Palate intact. Coordinated suck.  Lungs: Clear to auscultation bilaterally.   Cardiac: Regular regular rate and rhythm, S1S2, no murmurs.  Abdomen: Soft,  nontender, bowel sounds present, no hepatosplenomegaly or mass palpable. Umbilicus dry with no erythema or drainage.   : Mukul stage 1 female genitalia  Skin: Intact, dry, appropriate coloring for ethnicity, no jaundice.   Neuro: Appropriate muscle tone.     The visit lasted a total of 70 minutes that I spent on this visit today. This time includes pre-charting, review of the chart, and face to face time with the patient.      Completed by:   MARA Mckeon, IBCLC, [facility], Pediatrics.  6/2/2021 9:55 AM

## 2021-06-25 NOTE — TELEPHONE ENCOUNTER
Received forms with addendum. Will re-fax along with hospital and clinic notes. Sent forms to scanning. Notified pt via my-chart.

## 2021-06-25 NOTE — TELEPHONE ENCOUNTER
Left message to call back for: provider message/needs appointment seperate from baby today.  Information to relay to patient: Please discuss message below with pt. Also sent my-chart message to relay info.

## 2021-06-25 NOTE — TELEPHONE ENCOUNTER
We copied hospital info and faxed.  Patient will send us the update FMLA forms that I filled out last week to us for our records.

## 2021-06-25 NOTE — PROGRESS NOTES
Assessment/Plan:              Discussed history of postpartum depression with Melisa at her daughter's well-child check.  Will document Zoloft 50 mg in her chart and she will let me know if she needs to start that.  Could likely start at a half a tab or 25 mg daily.    Also patient had a delayed postpartum hemorrhage.  Hemoglobin went to 8.2.  She does feel lightheaded and somewhat short of breath.  She is agreeable to a hemoglobin check today.  She is on iron twice a day and Colace daily.    I did extend her medical leave for 8 weeks because of the postpartum hemorrhage.    She also has a history of vitamin D deficiency and will restart her vitamin D.    Harleen Rivera MD

## 2021-06-26 ENCOUNTER — HEALTH MAINTENANCE LETTER (OUTPATIENT)
Age: 36
End: 2021-06-26

## 2021-06-26 NOTE — TELEPHONE ENCOUNTER
Requested Prescriptions     Pending Prescriptions Disp Refills     ferrous sulfate 325 (65 FE) MG tablet 60 tablet 0     Sig: Take 1 tablet (325 mg total) by mouth 2 (two) times a day with meals.

## 2021-07-03 NOTE — ADDENDUM NOTE
Addendum Note by Harleen Rivera MD at 12/18/2020  4:33 PM     Author: Harleen Rivera MD Service: -- Author Type: Physician    Filed: 12/18/2020  4:33 PM Encounter Date: 12/18/2020 Status: Signed    : Harleen Rivera MD (Physician)    Addended by: HARLEEN RIVERA on: 12/18/2020 04:33 PM        Modules accepted: Orders

## 2021-07-03 NOTE — ADDENDUM NOTE
Addendum Note by Harleen Rivera MD at 12/18/2020  4:57 PM     Author: Harleen Rivera MD Service: -- Author Type: Physician    Filed: 12/18/2020  4:57 PM Encounter Date: 12/18/2020 Status: Signed    : Harleen Rivera MD (Physician)    Addended by: HARLEEN RIVERA on: 12/18/2020 04:57 PM        Modules accepted: Orders

## 2021-07-11 PROBLEM — E66.01 MORBID OBESITY (H): Status: ACTIVE | Noted: 2021-07-07

## 2021-07-14 ENCOUNTER — PATIENT OUTREACH (OUTPATIENT)
Dept: FAMILY MEDICINE | Facility: CLINIC | Age: 36
End: 2021-07-14

## 2021-07-14 PROBLEM — Z34.90 PREGNANT: Status: RESOLVED | Noted: 2018-07-17 | Resolved: 2019-08-21

## 2021-07-14 PROBLEM — Z33.1 PREGNANCY, INCIDENTAL: Status: RESOLVED | Noted: 2018-02-01 | Resolved: 2019-08-21

## 2021-07-14 PROBLEM — O32.0XX0 UNSTABLE FETAL LIE: Status: RESOLVED | Noted: 2018-07-12 | Resolved: 2019-08-21

## 2021-07-14 PROBLEM — F41.8 POSTPARTUM ANXIETY: Status: RESOLVED | Noted: 2018-08-23 | Resolved: 2019-08-21

## 2021-07-14 PROBLEM — O32.0XX0 UNSTABLE LIE OF FETUS: Status: RESOLVED | Noted: 2018-07-16 | Resolved: 2019-08-21

## 2021-09-23 DIAGNOSIS — Z82.49 FAMILY HISTORY OF HYPERTROPHIC CARDIOMYOPATHY: Primary | ICD-10-CM

## 2021-10-02 ENCOUNTER — IMMUNIZATION (OUTPATIENT)
Dept: FAMILY MEDICINE | Facility: CLINIC | Age: 36
End: 2021-10-02
Payer: COMMERCIAL

## 2021-10-02 PROCEDURE — 90471 IMMUNIZATION ADMIN: CPT

## 2021-10-02 PROCEDURE — 90686 IIV4 VACC NO PRSV 0.5 ML IM: CPT

## 2021-10-15 ENCOUNTER — HOSPITAL ENCOUNTER (OUTPATIENT)
Dept: CARDIOLOGY | Facility: HOSPITAL | Age: 36
Discharge: HOME | End: 2021-10-15
Attending: FAMILY MEDICINE | Admitting: FAMILY MEDICINE
Payer: COMMERCIAL

## 2021-10-15 DIAGNOSIS — Z82.49 FAMILY HISTORY OF HYPERTROPHIC CARDIOMYOPATHY: ICD-10-CM

## 2021-10-15 PROCEDURE — 93306 TTE W/DOPPLER COMPLETE: CPT

## 2021-10-15 PROCEDURE — 93306 TTE W/DOPPLER COMPLETE: CPT | Mod: 26 | Performed by: INTERNAL MEDICINE

## 2021-11-18 ENCOUNTER — MEDICAL CORRESPONDENCE (OUTPATIENT)
Dept: HEALTH INFORMATION MANAGEMENT | Facility: CLINIC | Age: 36
End: 2021-11-18
Payer: COMMERCIAL

## 2022-01-18 VITALS
TEMPERATURE: 96.6 F | RESPIRATION RATE: 16 BRPM | SYSTOLIC BLOOD PRESSURE: 120 MMHG | DIASTOLIC BLOOD PRESSURE: 71 MMHG | SYSTOLIC BLOOD PRESSURE: 124 MMHG | RESPIRATION RATE: 16 BRPM | BODY MASS INDEX: 37.61 KG/M2 | WEIGHT: 227.13 LBS | BODY MASS INDEX: 37.8 KG/M2 | TEMPERATURE: 98 F | DIASTOLIC BLOOD PRESSURE: 69 MMHG | HEART RATE: 87 BPM | WEIGHT: 228.25 LBS | HEART RATE: 91 BPM

## 2022-01-18 VITALS
TEMPERATURE: 97.1 F | BODY MASS INDEX: 38.39 KG/M2 | DIASTOLIC BLOOD PRESSURE: 73 MMHG | WEIGHT: 232.5 LBS | SYSTOLIC BLOOD PRESSURE: 116 MMHG | RESPIRATION RATE: 16 BRPM | HEART RATE: 82 BPM

## 2022-01-18 VITALS
BODY MASS INDEX: 38.97 KG/M2 | SYSTOLIC BLOOD PRESSURE: 129 MMHG | RESPIRATION RATE: 16 BRPM | TEMPERATURE: 97.2 F | HEART RATE: 95 BPM | DIASTOLIC BLOOD PRESSURE: 77 MMHG | WEIGHT: 236 LBS

## 2022-01-18 VITALS
TEMPERATURE: 97.4 F | HEART RATE: 72 BPM | RESPIRATION RATE: 16 BRPM | WEIGHT: 219 LBS | WEIGHT: 214.5 LBS | HEART RATE: 79 BPM | TEMPERATURE: 98.1 F | RESPIRATION RATE: 16 BRPM | SYSTOLIC BLOOD PRESSURE: 105 MMHG | BODY MASS INDEX: 35.52 KG/M2 | BODY MASS INDEX: 36.26 KG/M2 | DIASTOLIC BLOOD PRESSURE: 62 MMHG | DIASTOLIC BLOOD PRESSURE: 65 MMHG | SYSTOLIC BLOOD PRESSURE: 116 MMHG

## 2022-01-18 VITALS
RESPIRATION RATE: 16 BRPM | HEART RATE: 82 BPM | WEIGHT: 243.5 LBS | SYSTOLIC BLOOD PRESSURE: 121 MMHG | DIASTOLIC BLOOD PRESSURE: 65 MMHG | TEMPERATURE: 95.8 F | BODY MASS INDEX: 40.21 KG/M2

## 2022-01-18 VITALS
RESPIRATION RATE: 16 BRPM | BODY MASS INDEX: 35.24 KG/M2 | OXYGEN SATURATION: 98 % | WEIGHT: 212.8 LBS | SYSTOLIC BLOOD PRESSURE: 119 MMHG | TEMPERATURE: 98.1 F | HEART RATE: 78 BPM | DIASTOLIC BLOOD PRESSURE: 69 MMHG

## 2022-01-18 VITALS
SYSTOLIC BLOOD PRESSURE: 119 MMHG | BODY MASS INDEX: 39.65 KG/M2 | TEMPERATURE: 97.8 F | HEART RATE: 80 BPM | DIASTOLIC BLOOD PRESSURE: 71 MMHG | RESPIRATION RATE: 18 BRPM | WEIGHT: 240.13 LBS

## 2022-01-18 VITALS
HEART RATE: 97 BPM | SYSTOLIC BLOOD PRESSURE: 129 MMHG | TEMPERATURE: 96.5 F | RESPIRATION RATE: 16 BRPM | WEIGHT: 243 LBS | BODY MASS INDEX: 40.13 KG/M2 | DIASTOLIC BLOOD PRESSURE: 78 MMHG

## 2022-01-18 VITALS
SYSTOLIC BLOOD PRESSURE: 127 MMHG | HEIGHT: 65 IN | WEIGHT: 211 LBS | BODY MASS INDEX: 35.16 KG/M2 | TEMPERATURE: 98.6 F | HEART RATE: 86 BPM | OXYGEN SATURATION: 100 % | DIASTOLIC BLOOD PRESSURE: 69 MMHG

## 2022-01-18 VITALS
HEART RATE: 97 BPM | BODY MASS INDEX: 32.83 KG/M2 | RESPIRATION RATE: 16 BRPM | DIASTOLIC BLOOD PRESSURE: 76 MMHG | SYSTOLIC BLOOD PRESSURE: 122 MMHG | TEMPERATURE: 98.1 F | WEIGHT: 198.25 LBS

## 2022-01-18 VITALS
BODY MASS INDEX: 33.82 KG/M2 | WEIGHT: 204.25 LBS | HEART RATE: 80 BPM | RESPIRATION RATE: 16 BRPM | DIASTOLIC BLOOD PRESSURE: 66 MMHG | SYSTOLIC BLOOD PRESSURE: 113 MMHG | TEMPERATURE: 97.7 F

## 2022-01-18 VITALS
WEIGHT: 238.38 LBS | HEART RATE: 84 BPM | DIASTOLIC BLOOD PRESSURE: 61 MMHG | BODY MASS INDEX: 39.36 KG/M2 | TEMPERATURE: 97.1 F | SYSTOLIC BLOOD PRESSURE: 115 MMHG | RESPIRATION RATE: 16 BRPM

## 2022-01-18 ASSESSMENT — PATIENT HEALTH QUESTIONNAIRE - PHQ9: SUM OF ALL RESPONSES TO PHQ QUESTIONS 1-9: 2

## 2022-05-08 ENCOUNTER — E-VISIT (OUTPATIENT)
Dept: URGENT CARE | Facility: CLINIC | Age: 37
End: 2022-05-08
Payer: COMMERCIAL

## 2022-05-08 DIAGNOSIS — J01.90 ACUTE SINUSITIS WITH SYMPTOMS > 10 DAYS: Primary | ICD-10-CM

## 2022-05-08 PROCEDURE — 99421 OL DIG E/M SVC 5-10 MIN: CPT | Performed by: PHYSICIAN ASSISTANT

## 2022-05-08 NOTE — PATIENT INSTRUCTIONS
Dear Melisa Daily    I will go ahead and treat you for a sinus infection. If lung symptoms are persisting or worsening follow up in person for further evaluation of the lungs.     Based on your responses and diagnosis, I have prescribed Augmentin to treat your symptoms. I have sent this to your pharmacy.?     It is also important to stay well hydrated, get lots of rest and take over-the-counter decongestants,?tylenol?or ibuprofen if you?are able to?take those medications per your primary care provider to help relieve discomfort.?     It is important that you take?all of?your prescribed medication even if your symptoms are improving after a few doses.? Taking?all of?your medicine helps prevent the symptoms from returning.?     If your symptoms worsen, you develop severe headache, vomiting, high fever (>102), or are not improving in 7 days, please contact your primary care provider for an appointment or visit any of our convenient Walk-in Care or Urgent Care Centers to be seen which can be found on our website?here.?     Thanks again for choosing?us?as your health care partner,?   ?  Yudith Cota PA-C?

## 2022-06-21 ENCOUNTER — PATIENT OUTREACH (OUTPATIENT)
Dept: FAMILY MEDICINE | Facility: CLINIC | Age: 37
End: 2022-06-21
Payer: COMMERCIAL

## 2022-07-05 ASSESSMENT — ENCOUNTER SYMPTOMS
DYSURIA: 0
JOINT SWELLING: 0
NERVOUS/ANXIOUS: 0
ABDOMINAL PAIN: 0
HEMATOCHEZIA: 0
FEVER: 0
WEAKNESS: 0
DIZZINESS: 0
FREQUENCY: 0
BREAST MASS: 0
SORE THROAT: 0
PALPITATIONS: 0
CONSTIPATION: 0
DIARRHEA: 0
MYALGIAS: 0
PARESTHESIAS: 0
NAUSEA: 0
ARTHRALGIAS: 0
HEARTBURN: 0
EYE PAIN: 0
HEADACHES: 0
SHORTNESS OF BREATH: 0
HEMATURIA: 0
COUGH: 0
CHILLS: 0

## 2022-07-06 ENCOUNTER — OFFICE VISIT (OUTPATIENT)
Dept: FAMILY MEDICINE | Facility: CLINIC | Age: 37
End: 2022-07-06
Payer: COMMERCIAL

## 2022-07-06 VITALS
DIASTOLIC BLOOD PRESSURE: 61 MMHG | SYSTOLIC BLOOD PRESSURE: 101 MMHG | WEIGHT: 196.38 LBS | HEIGHT: 65 IN | BODY MASS INDEX: 32.72 KG/M2 | HEART RATE: 63 BPM

## 2022-07-06 DIAGNOSIS — R10.2 PELVIC PAIN IN FEMALE: ICD-10-CM

## 2022-07-06 DIAGNOSIS — D23.9 DYSPLASTIC NEVI: ICD-10-CM

## 2022-07-06 DIAGNOSIS — R87.810 CERVICAL HIGH RISK HPV (HUMAN PAPILLOMAVIRUS) TEST POSITIVE: ICD-10-CM

## 2022-07-06 DIAGNOSIS — Z00.00 ENCOUNTER FOR PREVENTATIVE ADULT HEALTH CARE EXAMINATION: Primary | ICD-10-CM

## 2022-07-06 DIAGNOSIS — Z12.4 CERVICAL CANCER SCREENING: ICD-10-CM

## 2022-07-06 DIAGNOSIS — Z82.49 FAMILY HISTORY OF HYPERTROPHIC CARDIOMYOPATHY: ICD-10-CM

## 2022-07-06 PROBLEM — Z34.90 PREGNANT: Status: RESOLVED | Noted: 2021-05-20 | Resolved: 2022-07-06

## 2022-07-06 LAB
ALBUMIN SERPL BCG-MCNC: 4.4 G/DL (ref 3.5–5.2)
ALP SERPL-CCNC: 90 U/L (ref 35–104)
ALT SERPL W P-5'-P-CCNC: 19 U/L (ref 10–35)
ANION GAP SERPL CALCULATED.3IONS-SCNC: 13 MMOL/L (ref 7–15)
AST SERPL W P-5'-P-CCNC: 14 U/L (ref 10–35)
BILIRUB SERPL-MCNC: 0.4 MG/DL
BUN SERPL-MCNC: 11.4 MG/DL (ref 6–20)
CALCIUM SERPL-MCNC: 9.3 MG/DL (ref 8.6–10)
CHLORIDE SERPL-SCNC: 102 MMOL/L (ref 98–107)
CHOLEST SERPL-MCNC: 207 MG/DL
CREAT SERPL-MCNC: 0.67 MG/DL (ref 0.51–0.95)
DEPRECATED CALCIDIOL+CALCIFEROL SERPL-MC: 76 UG/L (ref 20–75)
DEPRECATED HCO3 PLAS-SCNC: 25 MMOL/L (ref 22–29)
ERYTHROCYTE [DISTWIDTH] IN BLOOD BY AUTOMATED COUNT: 12.1 % (ref 10–15)
GFR SERPL CREATININE-BSD FRML MDRD: >90 ML/MIN/1.73M2
GLUCOSE SERPL-MCNC: 102 MG/DL (ref 70–99)
HCT VFR BLD AUTO: 40.1 % (ref 35–47)
HDLC SERPL-MCNC: 57 MG/DL
HGB BLD-MCNC: 13.5 G/DL (ref 11.7–15.7)
LDLC SERPL CALC-MCNC: 126 MG/DL
MCH RBC QN AUTO: 30.3 PG (ref 26.5–33)
MCHC RBC AUTO-ENTMCNC: 33.7 G/DL (ref 31.5–36.5)
MCV RBC AUTO: 90 FL (ref 78–100)
NONHDLC SERPL-MCNC: 150 MG/DL
PLATELET # BLD AUTO: 244 10E3/UL (ref 150–450)
POTASSIUM SERPL-SCNC: 4 MMOL/L (ref 3.4–5.3)
PROT SERPL-MCNC: 7.1 G/DL (ref 6.4–8.3)
RBC # BLD AUTO: 4.45 10E6/UL (ref 3.8–5.2)
SODIUM SERPL-SCNC: 140 MMOL/L (ref 136–145)
TRIGL SERPL-MCNC: 118 MG/DL
TSH SERPL DL<=0.005 MIU/L-ACNC: 0.79 UIU/ML (ref 0.3–4.2)
WBC # BLD AUTO: 6.3 10E3/UL (ref 4–11)

## 2022-07-06 PROCEDURE — 36415 COLL VENOUS BLD VENIPUNCTURE: CPT | Performed by: FAMILY MEDICINE

## 2022-07-06 PROCEDURE — 80053 COMPREHEN METABOLIC PANEL: CPT | Performed by: FAMILY MEDICINE

## 2022-07-06 PROCEDURE — 99395 PREV VISIT EST AGE 18-39: CPT | Performed by: FAMILY MEDICINE

## 2022-07-06 PROCEDURE — 85027 COMPLETE CBC AUTOMATED: CPT | Performed by: FAMILY MEDICINE

## 2022-07-06 PROCEDURE — 80061 LIPID PANEL: CPT | Performed by: FAMILY MEDICINE

## 2022-07-06 PROCEDURE — 87624 HPV HI-RISK TYP POOLED RSLT: CPT | Performed by: FAMILY MEDICINE

## 2022-07-06 PROCEDURE — 82306 VITAMIN D 25 HYDROXY: CPT | Performed by: FAMILY MEDICINE

## 2022-07-06 PROCEDURE — 84443 ASSAY THYROID STIM HORMONE: CPT | Performed by: FAMILY MEDICINE

## 2022-07-06 PROCEDURE — 99213 OFFICE O/P EST LOW 20 MIN: CPT | Mod: 25 | Performed by: FAMILY MEDICINE

## 2022-07-06 PROCEDURE — 88142 CYTOPATH C/V THIN LAYER: CPT | Performed by: FAMILY MEDICINE

## 2022-07-06 ASSESSMENT — ENCOUNTER SYMPTOMS
HEMATURIA: 0
PARESTHESIAS: 0
WEAKNESS: 0
HEMATOCHEZIA: 0
NERVOUS/ANXIOUS: 0
MYALGIAS: 0
HEARTBURN: 0
EYE PAIN: 0
DYSURIA: 0
ABDOMINAL PAIN: 0
CONSTIPATION: 0
JOINT SWELLING: 0
FEVER: 0
SORE THROAT: 0
SHORTNESS OF BREATH: 0
CHILLS: 0
NAUSEA: 0
FREQUENCY: 0
BREAST MASS: 0
DIARRHEA: 0
COUGH: 0
HEADACHES: 0
ARTHRALGIAS: 0
PALPITATIONS: 0
DIZZINESS: 0

## 2022-07-06 NOTE — PATIENT INSTRUCTIONS
Mammogram should be done yearly starting at age 40.  If insurance will cover a baseline between now and 41 let me know and I can place an order or you can call [phone] to set that up at our breast imaging center which is the third floor of the [facility] by [facility].  We do recommend self breast exams once a month and  Exams once a year.    Colonoscopy due age 41 with family history of colon polyps.  It would be due anytime if you have blood in your stool, change in bowel habits or abdominal pain.    Will order pelvic ultrasound. Can call [phone] to set at [place].    If it is normal, can do pelvic PT, order is in.  If you have not heard from the scheduling office within 2 business days, please call [phone] for [facility].    Plan echo in 2026 with family history of HCM, sooner if needed.    Sees Dermatology.      Health Maintenance   Topic Date Due    ANNUAL REVIEW OF HM ORDERS  Today    PREVENTIVE CARE VISIT  Today    PAP FOLLOW-UP  Today    HPV FOLLOW-UP  Today    INFLUENZA VACCINE (1) 09/01/2022    ADVANCE CARE PLANNING  Declined    DTAP/TDAP/TD IMMUNIZATION (5 - Td or Tdap) 03/04/2031    HEPATITIS C SCREENING  Completed    HIV SCREENING  Completed    PHQ-2 (once per calendar year)  Completed    MENINGITIS IMMUNIZATION  Completed    COVID-19 Vaccine  Completed    Pneumococcal Vaccine: Pediatrics (0 to 5 Years) and At-Risk Patients (6 to 64 Years)  Age 65    IPV IMMUNIZATION  Aged Out    HEPATITIS B IMMUNIZATION  Completed

## 2022-07-06 NOTE — PROGRESS NOTES
SUBJECTIVE:   CC: Melisa Daily is an 37 year old woman who presents for preventive health visit.       Patient has been advised of split billing requirements and indicates understanding: Yes  Healthy Habits:     Getting at least 3 servings of Calcium per day:  Yes    Bi-annual eye exam:  NO    Dental care twice a year:  Yes    Sleep apnea or symptoms of sleep apnea:  None    Diet:  Regular (no restrictions)    Frequency of exercise:  2-3 days/week    Duration of exercise:  15-30 minutes    Taking medications regularly:  Yes    Medication side effects:  Not applicable    PHQ-2 Total Score: 0    Additional concerns today:  Yes    HPV:  On paps for a couple of years.  Since 2018.  Nornal paps, but positive HR HPV.  No history of genital warts.    Pelvic pain:  On and off since before her 3rd child was born, she was born May 2021.  Hurts on and off with sneezing or roll over I bed.  Maybe or on the left then the right. No abnormal bleeding. No wars or lumps.  No vaginal discharge or odor.     Cousin  of HCM, no genetice testing done for her aunt and uncle.  She saw elsy seaman in pregnancy for advanced matermal age and said that aunt and uncle would need to be tested, but that would be to traumatic to ask them.  She is ok to do an echo every 5 years.  She had an echo in  and it was normal.      Health Maintenance   Topic Date Due     ANNUAL REVIEW OF HM ORDERS  Today     PREVENTIVE CARE VISIT  Today     PAP FOLLOW-UP  Today     HPV FOLLOW-UP  Today     INFLUENZA VACCINE (1) 2022     ADVANCE CARE PLANNING  Declined     DTAP/TDAP/TD IMMUNIZATION (5 - Td or Tdap) 2031     HEPATITIS C SCREENING  Completed     HIV SCREENING  Completed     PHQ-2 (once per calendar year)  Completed     MENINGITIS IMMUNIZATION  Completed     COVID-19 Vaccine  Completed     Pneumococcal Vaccine: Pediatrics (0 to 5 Years) and At-Risk Patients (6 to 64 Years)  Age 65     IPV IMMUNIZATION  Aged Out     HEPATITIS B  IMMUNIZATION  Completed                 Today's PHQ-2 Score:   PHQ-2 ( 1999 Pfizer) 7/5/2022   Q1: Little interest or pleasure in doing things 0   Q2: Feeling down, depressed or hopeless 0   PHQ-2 Score 0   Q1: Little interest or pleasure in doing things Not at all   Q2: Feeling down, depressed or hopeless Not at all   PHQ-2 Score 0       Abuse: Current or Past (Physical, Sexual or Emotional) - No  Do you feel safe in your environment? Yes        Social History     Tobacco Use     Smoking status: Never Smoker     Smokeless tobacco: Never Used   Substance Use Topics     Alcohol use: Yes     Alcohol/week: 2.5 standard drinks     Comment: Alcoholic Drinks/day: prior to pregnancy     If you drink alcohol do you typically have >3 drinks per day or >7 drinks per week? Yes      Alcohol Use 7/5/2022   Prescreen: >3 drinks/day or >7 drinks/week? Yes     AUDIT - Alcohol Use Disorders Identification Test - Reproduced from the World Health Organization Audit 2001 (Second Edition) 7/5/2022   1.  How often do you have a drink containing alcohol? 2 to 3 times a week   2.  How many drinks containing alcohol do you have on a typical day when you are drinking? 1 or 2   3.  How often do you have five or more drinks on one occasion? Never   9.  Have you or someone else been injured because of your drinking? No   10. Has a relative, friend, doctor or other health care worker been concerned about your drinking or suggested you cut down? No       Reviewed orders with patient.  Reviewed health maintenance and updated orders accordingly - Yes  Lab work is in process    Breast Cancer Screening:    Breast CA Risk Assessment (FHS-7) 7/5/2022   Do you have a family history of breast, colon, or ovarian cancer? No / Unknown           Pertinent mammograms are reviewed under the imaging tab.    History of abnormal Pap smear: YES - updated in Problem List and Health Maintenance accordingly  PAP / HPV Latest Ref Rng & Units 7/7/2021 11/12/2020  2/24/2020   PAP Negative for squamous intraepithelial lesion or malignancy. Negative for squamous intraepithelial lesion or malignancy  Electronically signed by Bimal Rome CT (ASCP) on 7/14/2021 at  1:57 PM   Negative for squamous intraepithelial lesion or malignancy  Electronically signed by Bimal Rome CT (ASCP) on 11/20/2020 at 12:33 PM   Negative for squamous intraepithelial lesion or malignancy  Electronically signed by Shirin Vazquez CT (ASCP) on 2/29/2020 at 11:51 AM     HPV16 NEG Negative Negative Negative   HPV18 NEG Negative Negative Negative   HRHPV NEG Positive(A) Positive(A) Positive(A)     Reviewed and updated as needed this visit by clinical staff   Tobacco  Allergies  Meds                Reviewed and updated as needed this visit by Provider                       Review of Systems   Constitutional: Negative for chills and fever.   HENT: Negative for congestion, ear pain, hearing loss and sore throat.    Eyes: Negative for pain and visual disturbance.   Respiratory: Negative for cough and shortness of breath.    Cardiovascular: Negative for chest pain, palpitations and peripheral edema.   Gastrointestinal: Negative for abdominal pain, constipation, diarrhea, heartburn, hematochezia and nausea.   Breasts:  Negative for tenderness, breast mass and discharge.   Genitourinary: Positive for pelvic pain. Negative for dysuria, frequency, genital sores, hematuria, urgency, vaginal bleeding and vaginal discharge.   Musculoskeletal: Negative for arthralgias, joint swelling and myalgias.   Skin: Negative for rash.   Neurological: Negative for dizziness, weakness, headaches and paresthesias.   Psychiatric/Behavioral: Negative for mood changes. The patient is not nervous/anxious.      CONSTITUTIONAL: NEGATIVE for fever, chills, change in weight  INTEGUMENTARU/SKIN: NEGATIVE for worrisome rashes, moles or lesions  EYES: NEGATIVE for vision changes or irritation  ENT: NEGATIVE for  "ear, mouth and throat problems  RESP: NEGATIVE for significant cough or SOB  BREAST: NEGATIVE for masses, tenderness or discharge  CV: NEGATIVE for chest pain, palpitations or peripheral edema  GI: NEGATIVE for nausea, abdominal pain, heartburn, or change in bowel habits  : NEGATIVE for unusual urinary or vaginal symptoms. Periods are regular.  MUSCULOSKELETAL: NEGATIVE for significant arthralgias or myalgia  NEURO: NEGATIVE for weakness, dizziness or paresthesias  PSYCHIATRIC: NEGATIVE for changes in mood or affect     OBJECTIVE:   /61 (BP Location: Left arm, Patient Position: Sitting, Cuff Size: Adult Regular)   Pulse 63   Ht 1.651 m (5' 5\")   Wt 89.1 kg (196 lb 6 oz)   LMP 06/26/2022 (Exact Date)   Breastfeeding Yes   BMI 32.68 kg/m    Physical Exam  GENERAL: healthy, alert and no distress  EYES: Eyes grossly normal to inspection, PERRL and conjunctivae and sclerae normal  HENT: ear canals and TM's normal, nose and mouth without ulcers or lesions  NECK: no adenopathy, no asymmetry, masses, or scars and thyroid normal to palpation  RESP: lungs clear to auscultation - no rales, rhonchi or wheezes  BREAST: normal without masses, tenderness or nipple discharge and no palpable axillary masses or adenopathy  CV: regular rate and rhythm, normal S1 S2, no S3 or S4, no murmur, click or rub, no peripheral edema and peripheral pulses strong  ABDOMEN: soft, nontender, no hepatosplenomegaly, no masses and bowel sounds normal   (female): normal female external genitalia, normal urethral meatus, vaginal mucosa pink, moist, well rugated, and normal cervix/adnexa/uterus without masses or discharge, except possibly some pain with palpation of the right adnexal region  MS: no gross musculoskeletal defects noted, no edema  SKIN: no suspicious lesions or rashes  NEURO: Normal strength and tone, mentation intact and speech normal  PSYCH: mentation appears normal, affect normal/bright    Diagnostic Test Results:  Labs " reviewed in Epic    ASSESSMENT/PLAN:       ICD-10-CM    1. Encounter for preventative adult health care examination  Z00.00 Lipid panel reflex to direct LDL Fasting     Lipid panel reflex to direct LDL Fasting   2. Cervical cancer screening  Z12.4 Pap Screen with HPV - recommended age 30 - 65 years   3. Cervical high risk HPV (human papillomavirus) test positive  R87.810    4. Family history of hypertrophic cardiomyopathy-cousin  age 40  Z82.49    5. Dysplastic nevi  D23.9 Vitamin D Deficiency     Vitamin D Deficiency   6. Pelvic pain in female  R10.2 US Pelvic Complete with Transvaginal     Comprehensive metabolic panel (BMP + Alb, Alk Phos, ALT, AST, Total. Bili, TP)     CBC with platelets     TSH with free T4 reflex     Physical Therapy Referral     Comprehensive metabolic panel (BMP + Alb, Alk Phos, ALT, AST, Total. Bili, TP)     CBC with platelets     TSH with free T4 reflex     Mammogram should be done yearly starting at age 40.  If insurance will cover a baseline between now and 41 let me know and I can place an order or you can call [phone] to set that up at our breast imaging center which is the third floor of the [facility] by [facility].  We do recommend self breast exams once a month and Dr. Exams once a year.    Colonoscopy due age 41 with family history of colon polyps.  It would be due anytime if you have blood in your stool, change in bowel habits or abdominal pain.    Will order pelvic ultrasound. Can call [phone] to set at [place].    If it is normal, can do pelvic PT, order is in.  If you have not heard from the scheduling office within 2 business days, please call [phone] for [facility].    Plan echo in  with family history of HCM, sooner if needed.    Sees Dermatology.    Patient has been advised of split billing requirements and indicates understanding: Yes    COUNSELING:  Reviewed preventive health counseling, as reflected in  "patient instructions       Regular exercise       Healthy diet/nutrition    Estimated body mass index is 32.68 kg/m  as calculated from the following:    Height as of this encounter: 1.651 m (5' 5\").    Weight as of this encounter: 89.1 kg (196 lb 6 oz).    Weight management plan: Discussed healthy diet and exercise guidelines    She reports that she has never smoked. She has never used smokeless tobacco.      Counseling Resources:  ATP IV Guidelines  Pooled Cohorts Equation Calculator  Breast Cancer Risk Calculator  BRCA-Related Cancer Risk Assessment: FHS-7 Tool  FRAX Risk Assessment  ICSI Preventive Guidelines  Dietary Guidelines for Americans, 2010  USDA's MyPlate  ASA Prophylaxis  Lung CA Screening    Harleen Rivera MD  [facility]  "

## 2022-07-11 LAB
BKR LAB AP GYN ADEQUACY: NORMAL
BKR LAB AP GYN INTERPRETATION: NORMAL
BKR LAB AP HPV REFLEX: NORMAL
BKR LAB AP LMP: NORMAL
BKR LAB AP PREVIOUS ABNL DX: NORMAL
BKR LAB AP PREVIOUS ABNORMAL: NORMAL
PATH REPORT.COMMENTS IMP SPEC: NORMAL
PATH REPORT.COMMENTS IMP SPEC: NORMAL
PATH REPORT.RELEVANT HX SPEC: NORMAL

## 2022-07-12 LAB
HUMAN PAPILLOMA VIRUS 16 DNA: NEGATIVE
HUMAN PAPILLOMA VIRUS 18 DNA: NEGATIVE
HUMAN PAPILLOMA VIRUS FINAL DIAGNOSIS: ABNORMAL
HUMAN PAPILLOMA VIRUS OTHER HR: POSITIVE

## 2022-07-13 ENCOUNTER — HOSPITAL ENCOUNTER (OUTPATIENT)
Dept: ULTRASOUND IMAGING | Facility: HOSPITAL | Age: 37
Discharge: HOME | End: 2022-07-13
Attending: FAMILY MEDICINE | Admitting: FAMILY MEDICINE
Payer: COMMERCIAL

## 2022-07-13 ENCOUNTER — PATIENT OUTREACH (OUTPATIENT)
Dept: FAMILY MEDICINE | Facility: CLINIC | Age: 37
End: 2022-07-13

## 2022-07-13 DIAGNOSIS — R10.2 PELVIC PAIN IN FEMALE: ICD-10-CM

## 2022-07-13 PROCEDURE — 76856 US EXAM PELVIC COMPLETE: CPT

## 2022-09-15 ENCOUNTER — OFFICE VISIT (OUTPATIENT)
Dept: FAMILY MEDICINE | Facility: CLINIC | Age: 37
End: 2022-09-15
Payer: COMMERCIAL

## 2022-09-15 ENCOUNTER — TRANSFERRED RECORDS (OUTPATIENT)
Dept: HEALTH INFORMATION MANAGEMENT | Facility: CLINIC | Age: 37
End: 2022-09-15

## 2022-09-15 VITALS
DIASTOLIC BLOOD PRESSURE: 74 MMHG | BODY MASS INDEX: 33.3 KG/M2 | TEMPERATURE: 98.4 F | RESPIRATION RATE: 16 BRPM | HEART RATE: 73 BPM | WEIGHT: 200.13 LBS | SYSTOLIC BLOOD PRESSURE: 127 MMHG

## 2022-09-15 DIAGNOSIS — R87.810 CERVICAL HIGH RISK HPV (HUMAN PAPILLOMAVIRUS) TEST POSITIVE: ICD-10-CM

## 2022-09-15 DIAGNOSIS — Z01.818 PRE-PROCEDURAL EXAMINATION: Primary | ICD-10-CM

## 2022-09-15 LAB — HCG UR QL: NEGATIVE

## 2022-09-15 PROCEDURE — 57452 EXAM OF CERVIX W/SCOPE: CPT | Performed by: FAMILY MEDICINE

## 2022-09-15 PROCEDURE — 81025 URINE PREGNANCY TEST: CPT | Performed by: FAMILY MEDICINE

## 2022-09-15 NOTE — PATIENT INSTRUCTIONS
Colposcopy today and normal.  No biopsies needed.    Since her youngest child is about 16 months old is give her body some more time to fight off HPV.    Plan another Pap with your physical next July.

## 2022-09-15 NOTE — PROGRESS NOTES
37 year old female presents for colposcopy, referred by me. Pap smear   2 months ago showed: Negative for squamous intraepithelial lesion or malignancy but positive for high risk HPV.  She has tested positive for high risk HPV for 4 years.  She did have a colposcopy in 2020 without visible lesions.  She has had 3 children and her last delivery 16 months ago.  She is not a smoker.  No genital warts.  Patient's last menstrual period was 08/22/2022.  Allergies: Ragweed pollen [ragweeds] and Hydrocodone-acetaminophen    Prior cervical/vaginal disease: Normal exam without visible pathology.  Prior cervical treatment: no treatment.    Procedure for colposcopy and biopsy has been explained to the   patient and consent obtained.    PROCEDURE:  Speculum placed in vagina and excellent visualization of cervix   acheived, cervix swabbed x 3 with acetic acid solution.    FINDINGS:  Urine pregnancy test negative    Cervix: no visible lesions; cervix swabbed with Lugol's solution, SCJ visualized 360 degrees without lesions and no biopsies taken.    Vaginal inspection: vaginal colposcopy not performed.    Vulvar colposcopy: vulvar colposcopy not performed.    Procedure Summary: Patient tolerated procedure well.    ASSESSMENT: Normal exam without visible pathology.    PLAN:   Colposcopy today and normal.  No biopsies needed.    Since her youngest child is about 16 months old is give her body some more time to fight off HPV.    Plan another Pap with your physical next July.

## 2022-09-25 ENCOUNTER — HEALTH MAINTENANCE LETTER (OUTPATIENT)
Age: 37
End: 2022-09-25

## 2022-10-01 ENCOUNTER — IMMUNIZATION (OUTPATIENT)
Dept: FAMILY MEDICINE | Facility: CLINIC | Age: 37
End: 2022-10-01
Payer: COMMERCIAL

## 2022-10-01 PROCEDURE — 90686 IIV4 VACC NO PRSV 0.5 ML IM: CPT

## 2022-10-01 PROCEDURE — 90471 IMMUNIZATION ADMIN: CPT

## 2022-10-11 ENCOUNTER — PATIENT OUTREACH (OUTPATIENT)
Dept: FAMILY MEDICINE | Facility: CLINIC | Age: 37
End: 2022-10-11

## 2022-10-31 ENCOUNTER — OFFICE VISIT (OUTPATIENT)
Dept: FAMILY MEDICINE | Facility: CLINIC | Age: 37
End: 2022-10-31
Payer: COMMERCIAL

## 2022-10-31 VITALS
TEMPERATURE: 98.7 F | BODY MASS INDEX: 32.07 KG/M2 | OXYGEN SATURATION: 99 % | HEART RATE: 81 BPM | RESPIRATION RATE: 16 BRPM | SYSTOLIC BLOOD PRESSURE: 116 MMHG | WEIGHT: 192.7 LBS | DIASTOLIC BLOOD PRESSURE: 80 MMHG

## 2022-10-31 DIAGNOSIS — R07.0 THROAT PAIN: Primary | ICD-10-CM

## 2022-10-31 DIAGNOSIS — J02.0 STREPTOCOCCAL SORE THROAT: ICD-10-CM

## 2022-10-31 LAB — DEPRECATED S PYO AG THROAT QL EIA: POSITIVE

## 2022-10-31 PROCEDURE — 87880 STREP A ASSAY W/OPTIC: CPT | Performed by: PHYSICIAN ASSISTANT

## 2022-10-31 PROCEDURE — 99213 OFFICE O/P EST LOW 20 MIN: CPT | Performed by: PHYSICIAN ASSISTANT

## 2022-10-31 RX ORDER — AMOXICILLIN 500 MG/1
500 CAPSULE ORAL 2 TIMES DAILY
Qty: 20 CAPSULE | Refills: 0 | Status: SHIPPED | OUTPATIENT
Start: 2022-10-31 | End: 2022-11-10

## 2022-10-31 NOTE — PROGRESS NOTES
SUBJECTIVE:  Melisa Daily is a 37 year old female with a chief complaint of sore throat.  Onset of symptoms was 3 day(s) ago.    Course of illness: gradual onset, worsening and constant.  Severity moderate and with no Respiratory Distress, Unable swallow liquids, Unable open mouth fully, Abdominal Pain. She is taking tylenol for the pain.   Has had chills and sweats, no cough.   Current and Associated symptoms: ear pain both  Treatment measures tried include Tylenol  Predisposing factors include None.  She is currently nursing.     Past Medical History:   Diagnosis Date     Cervical high risk HPV (human papillomavirus) test positive 1/3/2018    7/21/10 ASCUS, neg HPV 2012, 2013 NIL paps 2016 NIL pap, neg HPV 1/3/18 NIL pap, +HR HPV (not 16/18) 8/23/18 NIL pap, +HR HPV (not 16/18) 8/21/19 NIL pap, +HR HPV (not 16/18) 2/24/20 NIL pap, +HR HPV (not 16/18) 3/12/20 colpo visually normal, no biopsies 11/12/20 NIL pap, +HR HPV (not 16/18) in pregnancy. Plan: cotest in 1 year / await provider     Dysplastic nevi 3/4/2015     Normal vaginal delivery      Postpartum anxiety 8/23/2018     Postpartum depression      Current Outpatient Medications   Medication Sig Dispense Refill     cholecalciferol, vitamin D3, (VITAMIN D3) 5,000 unit Tab [CHOLECALCIFEROL, VITAMIN D3, (VITAMIN D3) 5,000 UNIT TAB]        loratadine (CLARITIN) 10 mg tablet [LORATADINE (CLARITIN) 10 MG TABLET] Take 10 mg by mouth daily.       magnesium glycinate 100 mg Tab [MAGNESIUM GLYCINATE 100 MG TAB] Take by mouth 3 (three) times a day.       omega-3s-dha-epa-fish oil-D3 350 mg- 1,000 unit cap [OMEGA-3S-DHA-EPA-FISH OIL-D3 350 MG- 1,000 UNIT CAP] Take by mouth.       prenatal no115-iron-folic acid 29 mg iron- 1 mg Chew [PRENATAL -IRON-FOLIC ACID 29 MG IRON- 1 MG CHEW] Chew daily.       triamcinolone (NASACORT) 55 mcg nasal inhaler [TRIAMCINOLONE (NASACORT) 55 MCG NASAL INHALER] 2 sprays each nostril daily. 1 Inhaler 12     Social History     Tobacco  Use     Smoking status: Never     Smokeless tobacco: Never   Substance Use Topics     Alcohol use: Yes     Alcohol/week: 2.5 standard drinks     Comment: Alcoholic Drinks/day: prior to pregnancy       ROS:  Review of systems negative except as stated above.    OBJECTIVE:   /80 (BP Location: Right arm, Patient Position: Sitting, Cuff Size: Adult Large)   Pulse 81   Temp 98.7  F (37.1  C) (Oral)   Resp 16   Wt 87.4 kg (192 lb 11.2 oz)   LMP 10/18/2022   SpO2 99%   BMI 32.07 kg/m    GENERAL APPEARANCE: healthy, alert and no distress EYES: anicteric sclerae, conjunctivae clear HENT: ear canals and TM's normal. Pharynx erythematous, no exudates NECK: supple, non-tender to palpation, no adenopathy noted RESP: lungs clear to auscultation - no rales, rhonchi or wheezes CV: regular rate and rhythm, normal S1 S2, no murmur noted SKIN: no suspicious lesions or rashes    Rapid Strep test is positive.     ASSESSMENT:  Strep Pharyngitis.   (R07.0) Throat pain  (primary encounter diagnosis)  Comment:   Plan: Streptococcus A Rapid Screen w/Reflex to PCR -         Clinic Collect       (J02.0) Streptococcal sore throat  Comment:   Plan: amoxicillin (AMOXIL) 500 MG capsule         Follow-up with primary clinic if not improving over the next 3-5 days, sooner if significantly worse.    Diagnosis and treatment plan were discussed with patient and/or parent. If symptoms worsen or do not improve in the next few days, follow-up with your primary care provider or visit an [facility] urgent care clinic location.  Patient verbalizes understanding of all things discussed. All questions were addressed and answered.

## 2022-11-04 ENCOUNTER — E-VISIT (OUTPATIENT)
Dept: FAMILY MEDICINE | Facility: CLINIC | Age: 37
End: 2022-11-04
Payer: COMMERCIAL

## 2022-11-04 DIAGNOSIS — B37.31 YEAST VAGINITIS: Primary | ICD-10-CM

## 2022-11-04 PROCEDURE — 99421 OL DIG E/M SVC 5-10 MIN: CPT | Performed by: FAMILY MEDICINE

## 2022-11-04 RX ORDER — FLUCONAZOLE 150 MG/1
150 TABLET ORAL DAILY
Qty: 2 TABLET | Refills: 0 | Status: SHIPPED | OUTPATIENT
Start: 2022-11-04 | End: 2023-06-02

## 2022-11-05 NOTE — PATIENT INSTRUCTIONS
Thank you for choosing us for your care. I have placed an order for a prescription so that you can start treatment. View your full visit summary for details by clicking on the link below. Your pharmacist will able to address any questions you may have about the medication.     If you re not feeling better within 2-3 days, please schedule an appointment.  You can schedule an appointment right here in [facility], or call [phone]  If the visit is for the same symptoms as your eVisit, we ll refund the cost of your eVisit if seen within seven days.      Yeast Infection (Candida Vaginal Infection)    You have a Candida vaginal infection. This is also known as a yeast infection. It's most often caused by a type of yeast (fungus) called Candida. Candida are normally found in the vagina. But if they increase in number, this can lead to infection and cause symptoms.   Symptoms of a yeast infection can include:     Clumpy or thin, white discharge, which may look like cottage cheese    Itching or burning    Burning with urination  Certain factors can make a yeast infection more likely. These can include:     Taking certain medicines, such as antibiotics or birth control pills    Pregnancy    Diabetes    Weak immune system  A yeast infection is most often treated with antifungal medicine. This may be given as a vaginal cream or pills you take by mouth. Treatment may last for about 1 to 7 days. Women with severe or recurrent infections may need longer courses of treatment.   Home care    If you re prescribed medicine, be sure to use it as directed. Finish all of the medicine, even if your symptoms go away. Don t try to treat yourself using over-the-counter products without talking with your provider first. They will let you know if this is a good option for you.    Ask your provider what steps you can take to help reduce your risk of having a yeast infection in the future.    Follow-up care  Follow up with your healthcare  provider, or as directed.   When to seek medical advice  Call your healthcare provider right away if:     You have a fever of 100.4 F (38 C) or higher, or as directed by your provider.    Your symptoms worsen, or they don t go away within a few days of starting treatment.    You have new pain in the lower belly or pelvic region.    You have side effects that bother you or a reaction to the cream or pills you re prescribed.    You or any partners you have sex with have new symptoms, such as a rash, joint pain, or sores.  [employer] last reviewed this educational content on 7/1/2020 2000-2021 [employer]. All rights reserved. This information is not intended as a substitute for professional medical care. Always follow your healthcare professional's instructions.

## 2022-11-09 ENCOUNTER — E-VISIT (OUTPATIENT)
Dept: FAMILY MEDICINE | Facility: CLINIC | Age: 37
End: 2022-11-09
Payer: COMMERCIAL

## 2022-11-09 DIAGNOSIS — J02.9 SORE THROAT: Primary | ICD-10-CM

## 2022-11-09 PROCEDURE — 99421 OL DIG E/M SVC 5-10 MIN: CPT | Performed by: FAMILY MEDICINE

## 2022-11-10 ENCOUNTER — LAB (OUTPATIENT)
Dept: LAB | Facility: CLINIC | Age: 37
End: 2022-11-10
Payer: COMMERCIAL

## 2022-11-10 DIAGNOSIS — J02.9 SORE THROAT: ICD-10-CM

## 2022-11-10 LAB
DEPRECATED S PYO AG THROAT QL EIA: NEGATIVE
FLUAV AG SPEC QL IA: NEGATIVE
FLUBV AG SPEC QL IA: NEGATIVE
GROUP A STREP BY PCR: NOT DETECTED

## 2022-11-10 PROCEDURE — 87804 INFLUENZA ASSAY W/OPTIC: CPT | Performed by: FAMILY MEDICINE

## 2022-11-10 PROCEDURE — 87651 STREP A DNA AMP PROBE: CPT

## 2023-01-30 ENCOUNTER — VIRTUAL VISIT (OUTPATIENT)
Dept: FAMILY MEDICINE | Facility: CLINIC | Age: 38
End: 2023-01-30
Payer: COMMERCIAL

## 2023-01-30 DIAGNOSIS — J01.00 ACUTE MAXILLARY SINUSITIS, RECURRENCE NOT SPECIFIED: Primary | ICD-10-CM

## 2023-01-30 DIAGNOSIS — J20.9 ACUTE BRONCHITIS WITH SYMPTOMS > 10 DAYS: ICD-10-CM

## 2023-01-30 PROCEDURE — 99213 OFFICE O/P EST LOW 20 MIN: CPT | Mod: 95 | Performed by: NURSE PRACTITIONER

## 2023-01-30 NOTE — PROGRESS NOTES
Melisa is a 37 year old who is being evaluated via a billable video visit.      How would you like to obtain your AVS? MyChart  If the video visit is dropped, the invitation should be resent by: Text to cell phone: [phone]  Will anyone else be joining your video visit? No        Subjective   Melisa is a 37 year old, presenting for the following health issues:  Cough and Eye Problem      History of Present Illness       Reason for visit:  I have had a cough and congestion for about a week now. I woke up last Tuesday with what I thought was pink eye - eye crusted over, redness in eye. The redness has gone done, but I'm still waking up with eyes crusted over a week later.  Symptom onset:  3-7 days ago  Symptoms include:  Cough - worse overnight, congestion, coughing mucus up, red eyes, crusted over eyes in the morning  Symptom intensity:  Moderate  Symptom progression:  Staying the same  Had these symptoms before:  No  What makes it worse:  9 pm - 9 am is the worst, especially with the cough, it has been waking me up at night  What makes it better:  I'm taking some OTC cough meds, and using a warm compress on my eyes in the morning. A steam shower works the best to clear me out.    She eats 2-3 servings of fruits and vegetables daily.She consumes 0 sweetened beverage(s) daily.She exercises with enough effort to increase her heart rate 9 or less minutes per day.  She exercises with enough effort to increase her heart rate 3 or less days per week.   She is taking medications regularly.       Acute Illness  Acute illness concerns: Symptoms started at least a week ago   Has taken a covid test just 3 days ago   Onset/Duration: over a wek   Symptoms:  Fever: No  Chills/Sweats: No  Headache (location?): No  Sinus Pressure: YES  Conjunctivitis:  Pink/dry eyes  Ear Pain: YES- slight  Rhinorrhea: YES  Congestion: YES  Sore Throat: No  Cough: YES-productive of green sputum, waxing and waning over time  Wheeze: is not wheezing    Decreased Appetite: No  Nausea: No  Vomiting: No  Diarrhea: No  Dysuria/Freq.: No  Dysuria or Hematuria: No  Fatigue/Achiness: YES  Sick/Strep Exposure: No  Therapies tried and outcome: delsym, eye drops  Had a respiratory infection similar to this in November   Does have allergies   Takes nasacort and Claritin daily   Is breastfeeding     Labs reviewed in EPIC  BP Readings from Last 3 Encounters:   10/31/22 116/80   09/15/22 127/74   22 101/61    Wt Readings from Last 3 Encounters:   10/31/22 87.4 kg (192 lb 11.2 oz)   09/15/22 90.8 kg (200 lb 2 oz)   22 89.1 kg (196 lb 6 oz)                  Patient Active Problem List   Diagnosis     Hyperlipidemia     Family history of hypertrophic cardiomyopathy-cousin  age 40     Environmental allergies     Cervical high risk HPV (human papillomavirus) test positive     Dysplastic nevi     Past Surgical History:   Procedure Laterality Date     PLANTAR'S WART EXCISION  Aug. 2015     WISDOM TOOTH EXTRACTION         Social History     Tobacco Use     Smoking status: Never     Smokeless tobacco: Never   Substance Use Topics     Alcohol use: Yes     Alcohol/week: 2.5 standard drinks     Comment: Alcoholic Drinks/day: prior to pregnancy     Family History   Problem Relation Age of Onset     Hypertension Mother      Cerebrovascular Disease Mother      Asthma Mother      Depression Mother      Coronary Artery Disease Mother      Depression Father      Coronary Artery Disease Father      Hypertension Father      Skin Cancer Father      Colon Polyps Father         Possible colon polyps     Diabetes Type 2  Maternal Grandmother      Cerebrovascular Disease Paternal Grandmother          Current Outpatient Medications   Medication Sig Dispense Refill     cholecalciferol, vitamin D3, (VITAMIN D3) 5,000 unit Tab [CHOLECALCIFEROL, VITAMIN D3, (VITAMIN D3) 5,000 UNIT TAB]        loratadine (CLARITIN) 10 mg tablet [LORATADINE (CLARITIN) 10 MG TABLET] Take 10 mg by mouth daily.        magnesium glycinate 100 mg Tab [MAGNESIUM GLYCINATE 100 MG TAB] Take by mouth 3 (three) times a day.       omega-3s-dha-epa-fish oil-D3 350 mg- 1,000 unit cap [OMEGA-3S-DHA-EPA-FISH OIL-D3 350 MG- 1,000 UNIT CAP] Take by mouth.       prenatal no115-iron-folic acid 29 mg iron- 1 mg Chew [PRENATAL -IRON-FOLIC ACID 29 MG IRON- 1 MG CHEW] Chew daily.       triamcinolone (NASACORT) 55 mcg nasal inhaler [TRIAMCINOLONE (NASACORT) 55 MCG NASAL INHALER] 2 sprays each nostril daily. 1 Inhaler 12     Allergies   Allergen Reactions     Ragweed Pollen [Ragweeds] Other (See Comments)     Runny nose, itchy eyes, sinus congestion, seasonal symptoms.     Hydrocodone-Acetaminophen Nausea and Vomiting       Review of Systems   All other systems reviewed and are negative.           Objective    Vitals - Patient Reported  Pain Score: No Pain (0)      Vitals:  No vitals were obtained today due to virtual visit.    Physical Exam   GENERAL: Patient is well nourished, well developed,in no apparent distress, non-toxic, in no respiratory distress and acyanotic, alert, cooperative and well hydrated  moderately uncomfortable and ill-appearing    EYES: Eyes grossly normal to inspection.  No discharge or erythema, or obvious scleral/conjunctival abnormalities.  HENT: normal cephalic/atraumatic and oral mucous membranes moist  RESP: No audible wheeze, cough, or visible cyanosis.  No visible retractions or increased work of breathing.    MS: No gross musculoskeletal defects noted.  Normal range of motion.  No visible edema.  SKIN: Visible skin clear. No significant rash, abnormal pigmentation or lesions.  NEURO: mentation intact  PSYCH: Mentation appears normal, affect normal/bright, judgement and insight intact, normal speech and appearance well-groomed.      Assessment & Plan     Acute maxillary sinusitis, recurrence not specified  Discussed the nature and pathophysiology of sinusitis and treatment including the role of antibiotics and  "the need to get over the underlying trigger, URI or allergies.  Will Start:  - amoxicillin-clavulanate (AUGMENTIN) 875-125 MG tablet  Dispense: 20 tablet; Refill: 0    Acute bronchitis with symptoms > 10 days  I discussed the pathophysiology of bronchitis and likely viral etiology.  I reviewed the risks and benefits of various over the counter and prescription medications.  Additionally, we reviewed the infectious nature of such infections and techniques to minimize transmission and future infections.  Will Start:  - amoxicillin-clavulanate (AUGMENTIN) 875-125 MG tablet  Dispense: 20 tablet; Refill: 0      Prescription drug management   Time spent doing chart review, history and exam, documentation and further activities per the note       BMI:   Estimated body mass index is 32.07 kg/m  as calculated from the following:    Height as of 7/6/22: 1.651 m (5' 5\").    Weight as of 10/31/22: 87.4 kg (192 lb 11.2 oz).       See Patient Instructions  Patient Instructions     PLAN:   1.   Symptomatic therapy suggested: rest, increase fluids, OTC Robitussin DM, and call prn if symptoms persist or worsen.  2.  Orders Placed This Encounter   Medications     amoxicillin-clavulanate (AUGMENTIN) 875-125 MG tablet     Sig: Take 1 tablet by mouth 2 times daily     Dispense:  20 tablet     Refill:  0     3. Patient needs to follow up in if no improvement,or sooner if worsening of symptoms or other symptoms develop.      Return in about 1 week (around 2/6/2023), or if symptoms worsen or fail to improve.    MICHAEL Huizar [facility]          Video-Visit Details    Type of service:  Video Visit     Originating Location (pt. Location): Home  Distant Location (provider location):  Off-site  Platform used for Video Visit: Ronaldo"

## 2023-01-30 NOTE — PATIENT INSTRUCTIONS
PLAN:   1.   Symptomatic therapy suggested: rest, increase fluids, OTC Robitussin DM, and call prn if symptoms persist or worsen.  2.  Orders Placed This Encounter   Medications    amoxicillin-clavulanate (AUGMENTIN) 875-125 MG tablet     Sig: Take 1 tablet by mouth 2 times daily     Dispense:  20 tablet     Refill:  0     3. Patient needs to follow up in if no improvement,or sooner if worsening of symptoms or other symptoms develop.

## 2023-06-01 DIAGNOSIS — B37.31 YEAST VAGINITIS: ICD-10-CM

## 2023-06-02 RX ORDER — FLUCONAZOLE 150 MG/1
TABLET ORAL
Qty: 2 TABLET | Refills: 0 | Status: SHIPPED | OUTPATIENT
Start: 2023-06-02 | End: 2023-06-22

## 2023-06-02 NOTE — TELEPHONE ENCOUNTER
"Routing refill request to provider for review/approval because:  Drug not active on patient's medication list    Last Written Prescription Date:  na  Last Fill Quantity: na,  # refills: na   Last office visit provider:  1/30/203     Requested Prescriptions   Pending Prescriptions Disp Refills     fluconazole (DIFLUCAN) 150 MG tablet [Pharmacy Med Name: FLUCONAZOLE 150MG TABLETS] 2 tablet 0     Sig: TAKE 1 TABLET(150 MG) BY MOUTH DAILY FOR 2 DOSES       Antifungal Agents Failed - 6/1/2023  7:20 AM        Failed - Not Fluconazole or Terconazole      If oral Fluconazole or Terconazole, may refill if indicated in progress notes.           Failed - Medication is active on med list        Passed - Recent (12 mo) or future (30 days) visit within the authorizing provider's specialty     Patient has had an office visit with the authorizing provider or a provider within the authorizing providers department within the previous 12 mos or has a future within next 30 days. See \"Patient Info\" tab in inbasket, or \"Choose Columns\" in Meds & Orders section of the refill encounter.                   Halle Braun RN 06/02/23 1:21 AM  "

## 2023-06-22 ENCOUNTER — VIRTUAL VISIT (OUTPATIENT)
Dept: FAMILY MEDICINE | Facility: CLINIC | Age: 38
End: 2023-06-22
Payer: COMMERCIAL

## 2023-06-22 DIAGNOSIS — E66.811 CLASS 1 OBESITY DUE TO EXCESS CALORIES WITHOUT SERIOUS COMORBIDITY WITH BODY MASS INDEX (BMI) OF 34.0 TO 34.9 IN ADULT: Primary | ICD-10-CM

## 2023-06-22 DIAGNOSIS — E66.09 CLASS 1 OBESITY DUE TO EXCESS CALORIES WITHOUT SERIOUS COMORBIDITY WITH BODY MASS INDEX (BMI) OF 34.0 TO 34.9 IN ADULT: Primary | ICD-10-CM

## 2023-06-22 PROCEDURE — 99213 OFFICE O/P EST LOW 20 MIN: CPT | Mod: VID | Performed by: FAMILY MEDICINE

## 2023-06-22 RX ORDER — PHENTERMINE HYDROCHLORIDE 15 MG/1
15 CAPSULE ORAL EVERY MORNING
Qty: 30 CAPSULE | Refills: 2 | Status: SHIPPED | OUTPATIENT
Start: 2023-06-22 | End: 2023-09-20

## 2023-06-22 NOTE — PATIENT INSTRUCTIONS
Phentermine 15 mg to take in the am.    If continuing on phentermine we need to do a med check in 3 months and it sounds like she has a physical already set up.    Cut caffeine in half with starting the medication.    No Sudafed, energy drinks etc while on this med.    Referral placed to the weight management clinic.  They can talk about the newer injectables if indicated.

## 2023-06-22 NOTE — PROGRESS NOTES
"Melisa is a 38 year old who is being evaluated via a billable video visit.      How would you like to obtain your AVS? MyChart  If the video visit is dropped, the invitation should be resent by: Text to cell phone: [phone]  Will anyone else be joining your video visit? No             Assessment & Plan       ICD-10-CM    1. Class 1 obesity due to excess calories without serious comorbidity with body mass index (BMI) of 34.0 to 34.9 in adult  E66.09 phentermine (ADIPEX-P) 15 MG capsule    Z68.34 Adult Comprehensive Weight Management  Referral        Phentermine 15 mg to take in the am.    If continuing on phentermine we need to do a med check in 3 months and it sounds like she has a physical already set up.    Cut caffeine in half with starting the medication.    No Sudafed, energy drinks etc while on this med.    Referral placed to the weight management clinic.  They can talk about the newer injectables if indicated.             BMI:   Estimated body mass index is 32.07 kg/m  as calculated from the following:    Height as of 7/6/22: 1.651 m (5' 5\").    Weight as of 10/31/22: 87.4 kg (192 lb 11.2 oz).   Weight management plan: Patient referred to endocrine and/or weight management specialty Discussed healthy diet and exercise guidelines        Harleen Rivera MD  [facility]    Marco Vincent is a 38 year old, presenting for the following health issues:  Weight Problem (Discuss weight loss medication )        6/22/2023     5:25 PM   Additional Questions   Roomed by Julian MENDEZ   Accompanied by hayley     History of Present Illness       Reason for visit:  Interested in weight loss medication    She eats 2-3 servings of fruits and vegetables daily.She consumes 0 sweetened beverage(s) daily.She exercises with enough effort to increase her heart rate 10 to 19 minutes per day.  She exercises with enough effort to increase her heart rate 5 days per week.   She is taking " medications regularly.       Obesity: Patient is trying to work on healthy diet and exercise but would like to consider medication.  She would like to start a medication and then would be agreeable to see the weight loss clinic to discuss some of the newer injectable medications.  She has no history of heart or lung disease.  No history of heart arrhythmias or tachycardia.  She does drink some coffee in the morning and a Diet Coke a couple times a week.  He does have some anxiety.  No other questions or concerns.        Review of Systems         Objective    Vitals - Patient Reported  Weight (Patient Reported): 94.3 kg (208 lb)        Physical Exam   GENERAL: Healthy, alert and no distress    Height 5 feet 5 inches, weight 209 pounds, BMI 34.6                Video-Visit Details    Type of service:  Video Visit     Originating Location (pt. Location): Home    Distant Location (provider location):  On-site  Platform used for Video Visit: Willem

## 2023-09-15 ENCOUNTER — NURSE TRIAGE (OUTPATIENT)
Dept: FAMILY MEDICINE | Facility: CLINIC | Age: 38
End: 2023-09-15
Payer: COMMERCIAL

## 2023-09-15 DIAGNOSIS — U07.1 INFECTION DUE TO 2019 NOVEL CORONAVIRUS: Primary | ICD-10-CM

## 2023-09-15 NOTE — TELEPHONE ENCOUNTER
COVID Positive/Requesting COVID treatment    Patient is positive for COVID and requesting treatment options.    Date of positive COVID test (PCR or at home)? 9/15/23  Current COVID symptoms: fever or chills, cough, fatigue, muscle or body aches, headache, sore throat, congestion or runny nose, and nausea or vomiting  Date COVID symptoms began: 9/14/23    Message should be routed to clinic RN pool. Best phone number to use for call back: [phone]

## 2023-09-15 NOTE — TELEPHONE ENCOUNTER
RN COVID TREATMENT VISIT  09/15/23      The patient has been triaged and does not require a higher level of care.    Melisa Daily  38 year old  Current weight? 192 lbs    Has the patient been seen by a primary care provider at an [facility] or [facility] within the past two years? Yes.   Have you been in close proximity to/do you have a known exposure to a person with a confirmed case of influenza? No.     General treatment eligibility:  Date of positive COVID test (PCR or at home)?  9/15/23    Are you or have you been hospitalized for this COVID-19 infection? No.   Have you received monoclonal antibodies or antiviral treatment for COVID-19 since this positive test? No.   Do you have any of the following conditions that place you at risk of being very sick from COVID-19?   - Overweight or Obesity (BMI >85th percentile or BMI 25 or higher)  Yes, patient has at least one high risk condition as noted above.     Current COVID symptoms:   - fever or chills  - cough  - fatigue  - muscle or body aches  - headache  - sore throat  - congestion or runny nose  - nausea or vomiting  Yes. Patient has at least one symptom as selected.     How many days since symptoms started? 5 days or less. Established patient, 12 years or older weighing at least 88.2 lbs, who has symptoms that started in the past 5 days, has not been hospitalized nor received treatment already, and is at risk for being very sick from COVID-19.     Treatment eligibility by RN:  Are you currently pregnant or nursing? No  Do you have a clinically significant hypersensitivity to nirmatrelvir or ritonavir, or toxic epidermal necrolysis (TEN) or Tanner-Joseph Syndrome? No  Do you have a history of hepatitis, any hepatic impairment on the Problem List (such as Child-Allen Class C, cirrhosis, fatty liver disease, alcoholic liver disease), or was the last liver lab (hepatic panel, ALT, AST, ALK Phos, bilirubin) elevated in the past 6  months? No  Do you have any history of severe renal impairment (eGFR < 30mL/min)? No    Is patient eligible to continue? Yes, patient meets all eligibility requirements for the RN COVID treatment (as denoted by all no responses above).     Current Outpatient Medications   Medication Sig Dispense Refill    loratadine (CLARITIN) 10 mg tablet [LORATADINE (CLARITIN) 10 MG TABLET] Take 10 mg by mouth daily.      magnesium glycinate 100 mg Tab [MAGNESIUM GLYCINATE 100 MG TAB] Take by mouth 3 (three) times a day.      phentermine (ADIPEX-P) 15 MG capsule Take 1 capsule (15 mg) by mouth every morning 30 capsule 2    triamcinolone (NASACORT) 55 mcg nasal inhaler [TRIAMCINOLONE (NASACORT) 55 MCG NASAL INHALER] 2 sprays each nostril daily. 1 Inhaler 12       Medications from List 1 of the standing order (on medications that exclude the use of Paxlovid) that patient is taking: NONE. Is patient taking Huang's Wort? No  Is patient taking Wasola's Wort or any meds from List 1? No.   Medications from List 2 of the standing order (on meds that provider needs to adjust) that patient is taking: NONE. Is patient on any of the meds from List 2? No.   Medications from List 3 of standing order (on meds that a RN needs to adjust) that patient is taking: NONE. Is patient on any meds from List 3? No.     Paxlovid has an approximate 90% reduction in hospitalization. Paxlovid can possibly cause altered sense of taste, diarrhea (loose, watery stools), high blood pressure, muscle aches.     Would patient like a Paxlovid prescription?   Yes.   Lab Results   Component Value Date    GFRESTIMATED >90 07/06/2022       Was last eGFR reduced? No, eGFR 60 or greater/ No Result on record. Patient can receive the normal renal function dose. Paxlovid Rx sent to [facility]   preferred    Temporary change to home medications: None    All medication adjustments (holds, etc) were discussed with the patient and patient was asked to repeat back  (teachback) their med adjustment.  Did patient understand med adjustment? No medication adjustments needed.         Reviewed the following instructions with the patient:    Paxlovid (nimatrelvir and ritonavir)    How it works  Two medicines (nirmatrelvir and ritonavir) are taken together. They stop the virus from growing. Less amount of virus is easier for your body to fight.    How to take  Medicine comes in a daily container with both medicine tablets. Take by mouth twice daily (once in the morning, once at night) for 5 days.  The number of tablets to take varies by patient.  Don't chew or break capsules. Swallow whole.    When to take  Take as soon as possible after positive COVID-19 test result, and within 5 days of your first symptoms.    Possible side effects  Can cause altered sense of taste, diarrhea (loose, watery stools), high blood pressure, muscle aches.    Ariana De Paz RN       Reason for Disposition   [1] HIGH RISK for severe COVID complications (e.g., weak immune system, age > 64 years, obesity with BMI > 25, pregnant, chronic lung disease or other chronic medical condition) AND [2] COVID symptoms (e.g., cough, fever)  (Exceptions: Already seen by PCP and no new or worsening symptoms.)    Additional Information   Negative: SEVERE difficulty breathing (e.g., struggling for each breath, speaks in single words)   Negative: Difficult to awaken or acting confused (e.g., disoriented, slurred speech)   Negative: Bluish (or gray) lips or face now   Negative: Shock suspected (e.g., cold/pale/clammy skin, too weak to stand, low BP, rapid pulse)   Negative: Sounds like a life-threatening emergency to the triager   Negative: SEVERE or constant chest pain or pressure  (Exception: Mild central chest pain, present only when coughing.)   Negative: MODERATE difficulty breathing (e.g., speaks in phrases, SOB even at rest, pulse 100-120)   Negative: [1] Headache AND [2] stiff neck (can't touch chin to chest)    Negative: Oxygen level (e.g., pulse oximetry) 90 percent or lower   Negative: Chest pain or pressure   Negative: Patient sounds very sick or weak to the triager   Negative: MILD difficulty breathing (e.g., minimal/no SOB at rest, SOB with walking, pulse <100)   Negative: Fever > 103 F (39.4 C)   Negative: [1] Fever > 101 F (38.3 C) AND [2] age > 60 years   Negative: [1] Fever > 100.0 F (37.8 C) AND [2] bedridden (e.g., nursing home patient, CVA, chronic illness, recovering from surgery)    Protocols used: Coronavirus (COVID-19) Diagnosed or [id number]

## 2023-09-19 ASSESSMENT — ENCOUNTER SYMPTOMS
DYSURIA: 0
COUGH: 1
BREAST MASS: 0
NERVOUS/ANXIOUS: 0
HEMATURIA: 0
ABDOMINAL PAIN: 0
MYALGIAS: 0
HEARTBURN: 0
SHORTNESS OF BREATH: 0
PARESTHESIAS: 0
SORE THROAT: 1
NAUSEA: 0
PALPITATIONS: 0
DIZZINESS: 0
EYE PAIN: 0
CHILLS: 0
HEADACHES: 0
WEAKNESS: 0
JOINT SWELLING: 0
DIARRHEA: 0
CONSTIPATION: 0
HEMATOCHEZIA: 0
FEVER: 0
ARTHRALGIAS: 0
FREQUENCY: 0

## 2023-09-20 ENCOUNTER — VIRTUAL VISIT (OUTPATIENT)
Dept: FAMILY MEDICINE | Facility: CLINIC | Age: 38
End: 2023-09-20
Payer: COMMERCIAL

## 2023-09-20 DIAGNOSIS — E66.811 CLASS 1 OBESITY DUE TO EXCESS CALORIES WITHOUT SERIOUS COMORBIDITY WITH BODY MASS INDEX (BMI) OF 34.0 TO 34.9 IN ADULT: ICD-10-CM

## 2023-09-20 DIAGNOSIS — E66.09 CLASS 1 OBESITY DUE TO EXCESS CALORIES WITHOUT SERIOUS COMORBIDITY WITH BODY MASS INDEX (BMI) OF 34.0 TO 34.9 IN ADULT: ICD-10-CM

## 2023-09-20 PROCEDURE — 99213 OFFICE O/P EST LOW 20 MIN: CPT | Mod: VID | Performed by: FAMILY MEDICINE

## 2023-09-20 RX ORDER — PHENTERMINE HYDROCHLORIDE 15 MG/1
15 CAPSULE ORAL EVERY MORNING
Qty: 30 CAPSULE | Refills: 2 | Status: SHIPPED | OUTPATIENT
Start: 2023-09-20 | End: 2023-12-06

## 2023-09-20 ASSESSMENT — ENCOUNTER SYMPTOMS
MYALGIAS: 0
JOINT SWELLING: 0
SHORTNESS OF BREATH: 0
CONSTIPATION: 0
FEVER: 0
CHILLS: 0
DIZZINESS: 0
ARTHRALGIAS: 0
HEARTBURN: 0
PARESTHESIAS: 0
WEAKNESS: 0
NAUSEA: 0
BREAST MASS: 0
DYSURIA: 0
HEMATURIA: 0
SORE THROAT: 1
HEMATOCHEZIA: 0
NERVOUS/ANXIOUS: 0
HEADACHES: 0
COUGH: 1
PALPITATIONS: 0
EYE PAIN: 0
FREQUENCY: 0
DIARRHEA: 0
ABDOMINAL PAIN: 0

## 2023-09-20 NOTE — PROGRESS NOTES
Melisa is a 38 year old who is being evaluated via a billable video visit.      How would you like to obtain your AVS? MyChart  If the video visit is dropped, the invitation should be resent by: Text to cell phone: [phone]  Will anyone else be joining your video visit? No          Assessment & Plan       ICD-10-CM    1. Class 1 obesity due to excess calories without serious comorbidity with body mass index (BMI) of 34.0 to 34.9 in adult  E66.09 phentermine (ADIPEX-P) 15 MG capsule    Z68.34         Refilled phentermine 15 mg 30 tabs 2 refills.    If she feels she needs a higher dose she will let us know.    Continue with healthy diet and exercise.    Helped set a physical with med check in 3 months.    Since she has COVID I would recommend not flying on her work trip.      20 minutes spent by me on the date of the encounter doing chart review, history and exam, documentation and further activities per the note           Harleen Rivera MD  [facility]   Melisa is a 38 year old, presenting for the following health issues:  Recheck Medication (Med check)        9/20/2023     8:56 AM   Additional Questions   Roomed by Angela Ann CMA       Healthy Habits:     Getting at least 3 servings of Calcium per day:  Yes    Bi-annual eye exam:  NO    Dental care twice a year:  Yes    Sleep apnea or symptoms of sleep apnea:  None    Diet:  Regular (no restrictions)    Frequency of exercise:  2-3 days/week    Duration of exercise:  15-30 minutes    Taking medications regularly:  Yes    Barriers to taking medications:  None    Medication side effects:  None    Additional concerns today:  Yes     Covid:  Tested positive 5 days.  Took Paxlovid. Was ill and now feels ok.    Weight:  On anterior main for 3 months.  Hard to sleep at first and ok now.  No chest pain palpitations or shortness of breath.  Lost 13-15 lbs.  Exercise with chaisng after toddler and walking 5 days a week.  03359 stpe 1-2 times a week. Other days aboout 6-9000.  Hard to stick to a great diet.  Insurance does not cover weight loss clinic or dieticain.                Review of Systems   Constitutional:  Negative for chills and fever.   HENT:  Positive for congestion and sore throat. Negative for ear pain and hearing loss.    Eyes:  Negative for pain and visual disturbance.   Respiratory:  Positive for cough. Negative for shortness of breath.    Cardiovascular:  Negative for chest pain, palpitations and peripheral edema.   Gastrointestinal:  Negative for abdominal pain, constipation, diarrhea, heartburn, hematochezia and nausea.   Breasts:  Negative for tenderness, breast mass and discharge.   Genitourinary:  Negative for dysuria, frequency, genital sores, hematuria, pelvic pain, urgency, vaginal bleeding and vaginal discharge.   Musculoskeletal:  Negative for arthralgias, joint swelling and myalgias.   Skin:  Negative for rash.   Neurological:  Negative for dizziness, weakness, headaches and paresthesias.   Psychiatric/Behavioral:  Negative for mood changes. The patient is not nervous/anxious.             Objective           Vitals:  No vitals were obtained today due to virtual visit.    Physical Exam   GENERAL: Healthy, alert and no distress on video                  Video-Visit Details    Type of service:  Video Visit     Originating Location (pt. Location): Home    Distant Location (provider location):  On-site  Platform used for Video Visit: [employer]

## 2023-09-20 NOTE — PATIENT INSTRUCTIONS
Refilled phentermine 15 mg 30 tabs 2 refills.    If she feels she needs a higher dose she will let us know.    Continue with healthy diet and exercise.    Helped set a physical with med check in 3 months.    Since she has COVID I would recommend not flying on her work trip.

## 2023-09-29 ENCOUNTER — PATIENT OUTREACH (OUTPATIENT)
Dept: FAMILY MEDICINE | Facility: CLINIC | Age: 38
End: 2023-09-29
Payer: COMMERCIAL

## 2023-09-29 NOTE — TELEPHONE ENCOUNTER
Patient due for Pap and HPV.    Reminder done today via [employer].    09/11/23 annual--notes added canceled   12/6/23 annual--notes added

## 2023-10-08 ENCOUNTER — MYC MEDICAL ADVICE (OUTPATIENT)
Dept: FAMILY MEDICINE | Facility: CLINIC | Age: 38
End: 2023-10-08
Payer: COMMERCIAL

## 2023-10-08 DIAGNOSIS — Z79.899 HIGH RISK MEDICATION USE: Primary | ICD-10-CM

## 2023-10-14 ENCOUNTER — HEALTH MAINTENANCE LETTER (OUTPATIENT)
Age: 38
End: 2023-10-14

## 2023-10-31 ENCOUNTER — IMMUNIZATION (OUTPATIENT)
Dept: FAMILY MEDICINE | Facility: CLINIC | Age: 38
End: 2023-10-31
Payer: COMMERCIAL

## 2023-10-31 PROCEDURE — 90471 IMMUNIZATION ADMIN: CPT

## 2023-10-31 PROCEDURE — 90686 IIV4 VACC NO PRSV 0.5 ML IM: CPT

## 2023-11-03 ENCOUNTER — ANCILLARY PROCEDURE (OUTPATIENT)
Dept: MAMMOGRAPHY | Facility: CLINIC | Age: 38
End: 2023-11-03
Attending: FAMILY MEDICINE
Payer: COMMERCIAL

## 2023-11-03 DIAGNOSIS — Z79.899 HIGH RISK MEDICATION USE: ICD-10-CM

## 2023-11-03 PROCEDURE — 77067 SCR MAMMO BI INCL CAD: CPT

## 2023-11-25 ENCOUNTER — OFFICE VISIT (OUTPATIENT)
Dept: FAMILY MEDICINE | Facility: CLINIC | Age: 38
End: 2023-11-25
Payer: COMMERCIAL

## 2023-11-25 VITALS
TEMPERATURE: 97.7 F | SYSTOLIC BLOOD PRESSURE: 131 MMHG | OXYGEN SATURATION: 100 % | DIASTOLIC BLOOD PRESSURE: 89 MMHG | BODY MASS INDEX: 31.78 KG/M2 | WEIGHT: 191 LBS | HEART RATE: 86 BPM

## 2023-11-25 DIAGNOSIS — J32.0 LEFT MAXILLARY SINUSITIS: Primary | ICD-10-CM

## 2023-11-25 PROCEDURE — 99213 OFFICE O/P EST LOW 20 MIN: CPT | Performed by: FAMILY MEDICINE

## 2023-11-25 RX ORDER — TRETINOIN 0.25 MG/G
CREAM TOPICAL
COMMUNITY
Start: 2023-11-21

## 2023-11-25 ASSESSMENT — PAIN SCALES - GENERAL: PAINLEVEL: MODERATE PAIN (4)

## 2023-11-26 NOTE — PROGRESS NOTES
Assessment/Plan:   1. Left maxillary sinusitis  - amoxicillin-clavulanate (AUGMENTIN) 875-125 MG tablet; Take 1 tablet by mouth 2 times daily for 10 days  Dispense: 20 tablet; Refill: 0    Acute left maxillary bacterial sinusitis following prolonged upper respiratory infection over the last couple weeks.   We will treat with Augmentin, nasal saline, steam, humidifier.  Consider Flonase if not already taking.  Probiotics or yogurt.  Recheck if worse or no better  I discussed red flag symptoms, return precautions to clinic/ER and follow up care with patient/parent.  Expected clinical course, symptomatic cares advised. Questions answered. Patient/parent amenable with plan.        Subjective:     Melisa Daily is a 38 year old female who presents for evaluation of increasing sinus pain and congestion.  Onset of illness a couple weeks ago with nasal congestion sneezing sore throat and ear pain.  She is continued to have sinus congestion which has gotten significantly worse over the last 2 to 3 days.  She feels pressure in her face and has pain from her throat into the her ears left greater than right.  She is felt feverish over the last day or 2.  No cough, vertigo, vomiting or diarrhea.  Home COVID test was negative , done in last couple days again.     Allergies   Allergen Reactions    Ragweed Pollen [Ragweeds] Other (See Comments)     Runny nose, itchy eyes, sinus congestion, seasonal symptoms.    Hydrocodone-Acetaminophen Nausea and Vomiting     Current Outpatient Medications   Medication    amoxicillin-clavulanate (AUGMENTIN) 875-125 MG tablet    loratadine (CLARITIN) 10 mg tablet    magnesium glycinate 100 mg Tab    phentermine (ADIPEX-P) 15 MG capsule    tretinoin (RETIN-A) 0.025 % external cream    triamcinolone (NASACORT) 55 mcg nasal inhaler     No current facility-administered medications for this visit.     Patient Active Problem List   Diagnosis    Hyperlipidemia    Family history of hypertrophic  cardiomyopathy-cousin  age 40    Environmental allergies    Cervical high risk HPV (human papillomavirus) test positive    Dysplastic nevi       Objective:     /89   Pulse 86   Temp 97.7  F (36.5  C)   Wt 86.6 kg (191 lb)   SpO2 100%   BMI 31.78 kg/m      Physical    General Appearance: Alert, pleasant, no distress, afebrile vital signs stable  Head: Normocephalic, without obvious abnormality, atraumatic  Eyes: Conjunctivae are normal.   Ears: Normal TMs and external ear canals, both ears  Nose: congestion.  Red swollen nasal turbinates with purulent crusting.  Left maxillary sinus pain with percussion  Throat: Throat is red posteriorly.  No exudate.  No vesicular lesions  Neck: No adenopathy  Lungs: Clear to auscultation bilaterally, respirations unlabored  Heart: Regular rate and rhythm  Psychiatric: Patient has a normal mood and affect.             This note has been dictated in part using voice recognition software.  Any grammatical or context distortions are unintentional and inherent to the software.  Please feel free to contact me directly for clarification if needed.

## 2023-12-05 ASSESSMENT — ENCOUNTER SYMPTOMS
NAUSEA: 0
PALPITATIONS: 0
DIARRHEA: 0
DYSURIA: 0
FREQUENCY: 0
SORE THROAT: 0
MYALGIAS: 0
HEMATURIA: 0
DIZZINESS: 0
JOINT SWELLING: 0
HEADACHES: 1
CONSTIPATION: 0
ABDOMINAL PAIN: 0
SHORTNESS OF BREATH: 0
WEAKNESS: 0
PARESTHESIAS: 0
HEMATOCHEZIA: 0
ARTHRALGIAS: 0
EYE PAIN: 0
COUGH: 0
HEARTBURN: 0
CHILLS: 0
BREAST MASS: 0
NERVOUS/ANXIOUS: 0
FEVER: 0

## 2023-12-06 ENCOUNTER — OFFICE VISIT (OUTPATIENT)
Dept: FAMILY MEDICINE | Facility: CLINIC | Age: 38
End: 2023-12-06
Payer: COMMERCIAL

## 2023-12-06 VITALS
WEIGHT: 190.6 LBS | HEART RATE: 77 BPM | HEIGHT: 65 IN | DIASTOLIC BLOOD PRESSURE: 83 MMHG | BODY MASS INDEX: 31.75 KG/M2 | OXYGEN SATURATION: 100 % | SYSTOLIC BLOOD PRESSURE: 136 MMHG | TEMPERATURE: 98.6 F | RESPIRATION RATE: 16 BRPM

## 2023-12-06 DIAGNOSIS — E78.5 HYPERLIPIDEMIA LDL GOAL <130: ICD-10-CM

## 2023-12-06 DIAGNOSIS — E66.811 CLASS 1 OBESITY DUE TO EXCESS CALORIES WITHOUT SERIOUS COMORBIDITY WITH BODY MASS INDEX (BMI) OF 34.0 TO 34.9 IN ADULT: ICD-10-CM

## 2023-12-06 DIAGNOSIS — Z91.09 ENVIRONMENTAL ALLERGIES: ICD-10-CM

## 2023-12-06 DIAGNOSIS — R73.9 HYPERGLYCEMIA: ICD-10-CM

## 2023-12-06 DIAGNOSIS — D23.9 DYSPLASTIC NEVI: ICD-10-CM

## 2023-12-06 DIAGNOSIS — N92.0 SPOTTING BETWEEN MENSES: ICD-10-CM

## 2023-12-06 DIAGNOSIS — E66.09 CLASS 1 OBESITY DUE TO EXCESS CALORIES WITHOUT SERIOUS COMORBIDITY WITH BODY MASS INDEX (BMI) OF 34.0 TO 34.9 IN ADULT: ICD-10-CM

## 2023-12-06 DIAGNOSIS — Z82.49 FAMILY HISTORY OF HYPERTROPHIC CARDIOMYOPATHY: ICD-10-CM

## 2023-12-06 DIAGNOSIS — Z00.00 ROUTINE GENERAL MEDICAL EXAMINATION AT A HEALTH CARE FACILITY: Primary | ICD-10-CM

## 2023-12-06 DIAGNOSIS — R09.81 NASAL CONGESTION: ICD-10-CM

## 2023-12-06 DIAGNOSIS — Z79.899 CONTROLLED SUBSTANCE AGREEMENT SIGNED: ICD-10-CM

## 2023-12-06 DIAGNOSIS — R87.810 CERVICAL HIGH RISK HPV (HUMAN PAPILLOMAVIRUS) TEST POSITIVE: ICD-10-CM

## 2023-12-06 DIAGNOSIS — Z12.4 CERVICAL CANCER SCREENING: ICD-10-CM

## 2023-12-06 LAB
AMPHETAMINES UR QL SCN: NORMAL
BARBITURATES UR QL SCN: NORMAL
BENZODIAZ UR QL SCN: NORMAL
BZE UR QL SCN: NORMAL
CANNABINOIDS UR QL SCN: NORMAL
ERYTHROCYTE [DISTWIDTH] IN BLOOD BY AUTOMATED COUNT: 11.9 % (ref 10–15)
FENTANYL UR QL: NORMAL
HBA1C MFR BLD: 5 % (ref 0–5.6)
HCT VFR BLD AUTO: 39.8 % (ref 35–47)
HGB BLD-MCNC: 13.4 G/DL (ref 11.7–15.7)
MCH RBC QN AUTO: 30 PG (ref 26.5–33)
MCHC RBC AUTO-ENTMCNC: 33.7 G/DL (ref 31.5–36.5)
MCV RBC AUTO: 89 FL (ref 78–100)
OPIATES UR QL SCN: NORMAL
PCP QUAL URINE (ROCHE): NORMAL
PLATELET # BLD AUTO: 234 10E3/UL (ref 150–450)
RBC # BLD AUTO: 4.46 10E6/UL (ref 3.8–5.2)
WBC # BLD AUTO: 7.7 10E3/UL (ref 4–11)

## 2023-12-06 PROCEDURE — 90480 ADMN SARSCOV2 VAC 1/ONLY CMP: CPT | Performed by: FAMILY MEDICINE

## 2023-12-06 PROCEDURE — 80061 LIPID PANEL: CPT | Performed by: FAMILY MEDICINE

## 2023-12-06 PROCEDURE — 80307 DRUG TEST PRSMV CHEM ANLYZR: CPT | Performed by: FAMILY MEDICINE

## 2023-12-06 PROCEDURE — 99395 PREV VISIT EST AGE 18-39: CPT | Performed by: FAMILY MEDICINE

## 2023-12-06 PROCEDURE — 80053 COMPREHEN METABOLIC PANEL: CPT | Performed by: FAMILY MEDICINE

## 2023-12-06 PROCEDURE — 84443 ASSAY THYROID STIM HORMONE: CPT | Performed by: FAMILY MEDICINE

## 2023-12-06 PROCEDURE — 91320 SARSCV2 VAC 30MCG TRS-SUC IM: CPT | Performed by: FAMILY MEDICINE

## 2023-12-06 PROCEDURE — 83036 HEMOGLOBIN GLYCOSYLATED A1C: CPT | Performed by: FAMILY MEDICINE

## 2023-12-06 PROCEDURE — 84146 ASSAY OF PROLACTIN: CPT | Performed by: FAMILY MEDICINE

## 2023-12-06 PROCEDURE — 99213 OFFICE O/P EST LOW 20 MIN: CPT | Mod: 25 | Performed by: FAMILY MEDICINE

## 2023-12-06 PROCEDURE — 82306 VITAMIN D 25 HYDROXY: CPT | Performed by: FAMILY MEDICINE

## 2023-12-06 PROCEDURE — 36415 COLL VENOUS BLD VENIPUNCTURE: CPT | Performed by: FAMILY MEDICINE

## 2023-12-06 PROCEDURE — G0145 SCR C/V CYTO,THINLAYER,RESCR: HCPCS | Performed by: FAMILY MEDICINE

## 2023-12-06 PROCEDURE — 85027 COMPLETE CBC AUTOMATED: CPT | Performed by: FAMILY MEDICINE

## 2023-12-06 PROCEDURE — 87624 HPV HI-RISK TYP POOLED RSLT: CPT | Performed by: FAMILY MEDICINE

## 2023-12-06 RX ORDER — PHENTERMINE HYDROCHLORIDE 15 MG/1
15 CAPSULE ORAL EVERY MORNING
Qty: 30 CAPSULE | Refills: 2 | Status: SHIPPED | OUTPATIENT
Start: 2023-12-06 | End: 2024-01-10

## 2023-12-06 ASSESSMENT — ENCOUNTER SYMPTOMS
HEADACHES: 1
PARESTHESIAS: 0
CONSTIPATION: 0
MYALGIAS: 0
FREQUENCY: 0
SORE THROAT: 0
ARTHRALGIAS: 0
FEVER: 0
NAUSEA: 0
DIZZINESS: 0
HEARTBURN: 0
DIARRHEA: 0
ABDOMINAL PAIN: 0
CHILLS: 0
PALPITATIONS: 0
SHORTNESS OF BREATH: 0
EYE PAIN: 0
HEMATOCHEZIA: 0
WEAKNESS: 0
DYSURIA: 0
NERVOUS/ANXIOUS: 0
HEMATURIA: 0
BREAST MASS: 0
JOINT SWELLING: 0
COUGH: 0

## 2023-12-06 NOTE — PATIENT INSTRUCTIONS
Great job with weight loss!  Recommend 10,000 steps a day or 45 minutes of aerobic activity daily.  Recommend tracking calories and lower carb diet.    Today do a controlled substance agreement and urine tox for phentermine.  Even on the 6 months and can have 9 more months of this medication before the body needs about a 6-month break.    We will help set it in person or virtual visit in 3 months.  If between now and then you like to go up to the next dose which is 30 mg send a [employer] message.    With your midcycle bleeding over the last 3 to 4 months I am ordering a pelvic ultrasound.  They should call you or you can call [phone] to set that up.  Also ordering blood work to include thyroid and prolactin.    If that is all normal and the spotting persists I will refer to gynecology and I will discuss a saline ultrasound and or a hysteroscopy and D&C.     ENT consult for the chronic nasal congestion and the concern of possible deviated septum.    We will plan an echo or heart ultrasound in 2026.  That is for the family history of hypertrophic cardiomyopathy.    I am glad to see dermatology routinely with history of dysplastic nevi.    Preventive Health Recommendations  Female Ages 26 - 39  Yearly exam:   See your health care provider every year in order to  Review health changes.   Discuss preventive care.    Review your medicines if you your doctor has prescribed any.    Until age 30: Get a Pap test every three years (more often if you have had an abnormal result).    After age 30: Talk to your doctor about whether you should have a Pap test every 3 years or have a Pap test with HPV screening every 5 years.   You do not need a Pap test if your uterus was removed (hysterectomy) and you have not had cancer.  You should be tested each year for STDs (sexually transmitted diseases), if you're at risk.   Talk to your provider about how often to have your cholesterol checked.  If you are at risk for diabetes, you  should have a diabetes test (fasting glucose).  Shots: Get a flu shot each year. Get a tetanus shot every 10 years.   Nutrition:   Eat at least 5 servings of fruits and vegetables each day.  Eat whole-grain bread, whole-wheat pasta and brown rice instead of white grains and rice.  Get adequate Calcium and Vitamin D.     Lifestyle  Exercise at least 150 minutes a week (30 minutes a day, 5 days of the week). This will help you control your weight and prevent disease.  Limit alcohol to one drink per day.  No smoking.   Wear sunscreen to prevent skin cancer.  See your dentist every six months for an exam and cleaning.    Health Maintenance   Topic Date Due    HEPATITIS B IMMUNIZATION (1 of 3 - 3-dose series) Completed    PAP FOLLOW-UP  Today    HPV FOLLOW-UP  Today    COVID-19 Vaccine (6 - 2023-24 season) Today    ANNUAL REVIEW OF HM ORDERS  Today    ADVANCE CARE PLANNING  Declined    YEARLY PREVENTIVE VISIT  Today    DTAP/TDAP/TD IMMUNIZATION (5 - Td or Tdap) 03/04/2031    HEPATITIS C SCREENING  Completed    HIV SCREENING  Completed    PHQ-2 (once per calendar year)  Completed    INFLUENZA VACCINE  Completed    HPV IMMUNIZATION  Completed    MENINGITIS IMMUNIZATION  Completed    Pneumococcal Vaccine: Pediatrics (0 to 5 Years) and At-Risk Patients (6 to 64 Years)  Age 65    IPV IMMUNIZATION  Aged Out    RSV MONOCLONAL ANTIBODY  Aged Out    PAP  Today     Mammogram:  Due age 40.    Colonoscopy:  Due age 40.

## 2023-12-06 NOTE — LETTER
[facility]  12/06/23  Patient: Melisa Daily  YOB: 1985  Medical Record Number: [id number]                                                                                  Non-Opioid Controlled Substance Agreement    This is an agreement between you and your provider regarding safe and appropriate use of controlled substances prescribed by your care team. Controlled substances are?medicines that can cause physical and mental dependence (abuse).     There are strict laws about having and using these medicines. We here at [facility] are  committed to working with you in your efforts to get better. To support you in this work, we'll help you schedule regular office appointments for medicine refills. If we must cancel or change your appointment for any reason, we'll make sure you have enough medicine to last until your next appointment.     As a Provider, I will:   Listen carefully to your concerns while treating you with respect.   Recommend a treatment plan that I believe is in your best interest and may involve therapies other than medicine.    Talk with you often about the possible benefits and the risk of harm of any medicine that we prescribe for you.  Assess the safety of this medicine and check how well it works.    Provide a plan on how to taper (discontinue or go off) using this medicine if the decision is made to stop its use.      ::  As a Patient, I understand controlled substances:     Are prescribed by my care provider to help me function or work and manage my condition(s).?  Are strong medicines and can cause serious side effects.     Need to be taken exactly as prescribed.?Combining controlled substances with certain medicines or chemicals (such as illegal drugs, alcohol, sedatives, sleeping pills, and benzodiazepines) can be dangerous or even fatal.? If I stop taking my medicines suddenly, I may have severe withdrawal symptoms.     The risks, benefits,  and side effects of these medicine(s) were explained to me. I agree that:    I will take part in other treatments as advised by my care team. This may be psychiatry or counseling, physical therapy, behavioral therapy, group treatment or a referral to specialist.    I will keep all my appointments and understand this is part of the monitoring of controlled substances.?My care team may require an office visit for EVERY controlled substance refill. If I miss appointments or don t follow instructions, my care team may stop my medicine    I will take my medicines as prescribed. I will not change the dose or schedule unless my care team tells me to. There will be no refills if I run out early.      I may be asked to come to the clinic and complete a urine drug test or complete a pill count. If I don t give a urine sample or participate in a pill count, the care team may stop my medicine.    I will only receive controlled substance prescriptions from this clinic. If I am treated by another provider, I will tell them that I am taking controlled substances and that I have a treatment agreement with this provider. I will inform my [facility] care team within one business day if I am given a prescription for any controlled substance by another healthcare provider. My [facility] care team can contact other providers and pharmacists about my use of any medicines.    It is up to me to make sure that I don't run out of my medicines on weekends or holidays.?If my care team is willing to refill my prescription without a visit, I must request refills only during office hours. Refills may take up to 3 business days to process. I will use one pharmacy to fill all my controlled substance prescriptions. I will notify the clinic about any changes to my insurance or medicine availability.    I am responsible for my prescriptions. If the medicine/prescription is lost, stolen or destroyed, it will not be replaced.?I also agree  not to share controlled substance medicines with anyone.     I am aware I should not use any illegal or recreational drugs. I agree not to drink alcohol unless my care team says I can.     If I enroll in the [place] Medical Cannabis program, I will tell my care team before my next refill.    I will tell my care team right away if I become pregnant, have a new medical problem treated outside of my regular clinic, or have a change in my medicines.     I understand that this medicine can affect my thinking, judgment and reaction time.? Alcohol and drugs affect the brain and body, which can affect the safety of my driving. Being under the influence of alcohol or drugs can affect my decision-making, behaviors, personal safety and the safety of others. Driving while impaired (DWI) can occur if a person is driving, operating or in physical control of a car, motorcycle, boat, snowmobile, ATV, motorbike, off-road vehicle or any other motor vehicle ([place] Statute 169A.20). I understand the risk if I choose to drive or operate any vehicle or machinery.    I understand that if I do not follow any of the conditions above, my prescriptions or treatment may be stopped or changed.   I agree that my provider, clinic care team and pharmacy may work with any city, state or federal law enforcement agency that investigates the misuse, sale or other diversion of my controlled medicine. I will allow my provider to discuss my care with, or share a copy of, this agreement with any other treating provider, pharmacy or emergency room where I receive care.     I have read this agreement and have asked questions about anything I did not understand.    ________________________________________________________  Patient Signature - Melisa Daily     ___________________                   Date     ________________________________________________________  Provider Signature - Harleen Rivera MD       ___________________                   Date      ________________________________________________________  Witness Signature (required if provider not present while patient signing)          ___________________                   Date

## 2023-12-06 NOTE — PROGRESS NOTES
SUBJECTIVE:   Melisa is a 38 year old, presenting for the following:  Physical        12/6/2023    12:56 PM   Additional Questions   Roomed by Alis FRANK CMA       Healthy Habits:     Getting at least 3 servings of Calcium per day:  Yes    Bi-annual eye exam:  NO    Dental care twice a year:  Yes    Sleep apnea or symptoms of sleep apnea:  None    Diet:  Regular (no restrictions)    Frequency of exercise:  2-3 days/week    Duration of exercise:  15-30 minutes    Taking medications regularly:  Yes    Medication side effects:  None    Additional concerns today:  Yes    Obesity:  On Phentermine.  She been on this for 6 months.  No side effects of chest pain, shortness of breath palpitations.  He did have a hard time to call sleep at first, better now.  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  7000 steps a day.  Was tracking calories, now watching what she eats.  Not as hungry overall.  Insurance does not cover a referral to weight loss clinic.  She is down 24 pounds since May.    Spotting between periods:  Last couple of months will get period for 4 days as usually, then will bleed 1 week later for 1-2 days.  This month spotted again a second time. Nov. 22 period started, spotted Nov 29 for 2 days, then spotted Dec 5.  New for her with last 3 periods.    Seasonal allergies: Using Nasacort and nasal saline seems to helps some with nasasl congestion.  She has a family history of deviated septum and wonders if she may have that.          Health Maintenance   Topic Date Due    HEPATITIS B IMMUNIZATION (1 of 3 - 3-dose series) Completed    PAP FOLLOW-UP  Today    HPV FOLLOW-UP  Today    COVID-19 Vaccine (6 - 2023-24 season) Today    ANNUAL REVIEW OF HM ORDERS  Today    ADVANCE CARE PLANNING  Declined    YEARLY PREVENTIVE VISIT  Today    DTAP/TDAP/TD IMMUNIZATION (5 - Td or Tdap) 03/04/2031    HEPATITIS C SCREENING  Completed    HIV SCREENING  Completed    PHQ-2 (once per calendar year)  Completed    INFLUENZA VACCINE  Completed    HPV  IMMUNIZATION  Completed    MENINGITIS IMMUNIZATION  Completed    Pneumococcal Vaccine: Pediatrics (0 to 5 Years) and At-Risk Patients (6 to 64 Years)  Age 65    IPV IMMUNIZATION  Aged Out    RSV MONOCLONAL ANTIBODY  Aged Out    PAP  Today     Mammogram:  Due age 40.    Colonoscopy:  Due age 40.        Social History     Tobacco Use    Smoking status: Never    Smokeless tobacco: Never   Substance Use Topics    Alcohol use: Yes     Alcohol/week: 2.5 standard drinks of alcohol     Comment: Alcoholic Drinks/day: prior to pregnancy             2023    11:02 PM   Alcohol Use   Prescreen: >3 drinks/day or >7 drinks/week? No         2022     9:48 PM   AUDIT - Alcohol Use Disorders Identification Test - Reproduced from the World Health Organization Audit 2001 (Second Edition)   1.  How often do you have a drink containing alcohol? 2 to 3 times a week   2.  How many drinks containing alcohol do you have on a typical day when you are drinking? 1 or 2   3.  How often do you have five or more drinks on one occasion? Never   9.  Have you or someone else been injured because of your drinking? No   10. Has a relative, friend, doctor or other health care worker been concerned about your drinking or suggested you cut down? No     Reviewed orders with patient.  Reviewed health maintenance and updated orders accordingly - Yes  Lab work is in process    Breast Cancer Screenin/5/2022     9:49 PM   Breast CA Risk Assessment (FHS-7)   Do you have a family history of breast, colon, or ovarian cancer? No / Unknown         Patient under 40 years of age: Routine Mammogram Screening not recommended.   Pertinent mammograms are reviewed under the imaging tab.    History of abnormal Pap smear: YES - updated in Problem List and Health Maintenance accordingly      Latest Ref Rng & Units 2022     9:05 AM 2021     3:21 PM 2020    12:18 PM   PAP / HPV   PAP  Negative for Intraepithelial Lesion or Malignancy (NILM)   "Negative for squamous intraepithelial lesion or malignancy  Electronically signed by Bimal Rome CT (ASCP) on 7/14/2021 at  1:57 PM    Negative for squamous intraepithelial lesion or malignancy  Electronically signed by Bimal Rome CT (ASCP) on 11/20/2020 at 12:33 PM      HPV 16 DNA Negative Negative  Negative  Negative    HPV 18 DNA Negative Negative  Negative  Negative    Other HR HPV Negative Positive  Positive  Positive      Reviewed and updated as needed this visit by clinical staff   Tobacco  Allergies  Meds              Reviewed and updated as needed this visit by Provider                     Review of Systems   Constitutional:  Negative for chills and fever.   HENT:  Positive for ear pain. Negative for congestion, hearing loss and sore throat.    Eyes:  Negative for pain and visual disturbance.   Respiratory:  Negative for cough and shortness of breath.    Cardiovascular:  Negative for chest pain, palpitations and peripheral edema.   Gastrointestinal:  Negative for abdominal pain, constipation, diarrhea, heartburn, hematochezia and nausea.   Breasts:  Negative for tenderness, breast mass and discharge.   Genitourinary:  Positive for vaginal bleeding and vaginal discharge. Negative for dysuria, frequency, genital sores, hematuria, pelvic pain and urgency.   Musculoskeletal:  Negative for arthralgias, joint swelling and myalgias.   Skin:  Negative for rash.   Neurological:  Positive for headaches. Negative for dizziness, weakness and paresthesias.   Psychiatric/Behavioral:  Positive for mood changes. The patient is not nervous/anxious.           OBJECTIVE:   /83 (BP Location: Left arm, Patient Position: Sitting, Cuff Size: Adult Regular)   Pulse 77   Temp 98.6  F (37  C) (Oral)   Resp 16   Ht 1.651 m (5' 5\")   Wt 86.5 kg (190 lb 9.6 oz)   LMP 11/22/2023 (Exact Date)   SpO2 100%   BMI 31.72 kg/m    Physical Exam  GENERAL: healthy, alert and no distress  EYES: Eyes grossly " normal to inspection, PERRL and conjunctivae and sclerae normal  HENT: ear canals and TM's normal, nose and mouth without ulcers or lesions  NECK: no adenopathy, no asymmetry, masses, or scars and thyroid normal to palpation  RESP: lungs clear to auscultation - no rales, rhonchi or wheezes  BREAST: normal without masses, tenderness or nipple discharge and no palpable axillary masses or adenopathy  CV: regular rate and rhythm, normal S1 S2, no S3 or S4, no murmur, click or rub, no peripheral edema and peripheral pulses strong  ABDOMEN: soft, nontender, no hepatosplenomegaly, no masses and bowel sounds normal   (female): normal female external genitalia, normal urethral meatus, vaginal mucosa pink, moist, well rugated, and normal cervix, unable to palpate uterus and ovaries secondary to body habitus  MS: no gross musculoskeletal defects noted, no edema  SKIN: no suspicious lesions or rashes  NEURO: Normal strength and tone, mentation intact and speech normal  PSYCH: mentation appears normal, affect normal/bright    Diagnostic Test Results:  Labs reviewed in Epic    ASSESSMENT/PLAN:       ICD-10-CM    1. Routine general medical examination at a health care facility  Z00.00 Lipid panel reflex to direct LDL Fasting     Lipid panel reflex to direct LDL Fasting      2. Cervical cancer screening  Z12.4 Pap Screen with HPV - recommended age 30 - 65 years      3. Controlled substance agreement signed-CSA and urine tox for Phentermine [id number]  Z79.899 Urine Drug Screen     Urine Drug Screen      4. Class 1 obesity due to excess calories without serious comorbidity with body mass index (BMI) of 34.0 to 34.9 in adult  E66.09 phentermine (ADIPEX-P) 15 MG capsule    Z68.34       5. Environmental allergies  Z91.09       6. Family history of hypertrophic cardiomyopathy-cousin  age 40  Z82.49       7. Dysplastic nevi  D23.9 Vitamin D Deficiency     Vitamin D Deficiency      8. Hyperlipidemia LDL goal <130  E78.5  Comprehensive metabolic panel (BMP + Alb, Alk Phos, ALT, AST, Total. Bili, TP)     CBC with platelets     Comprehensive metabolic panel (BMP + Alb, Alk Phos, ALT, AST, Total. Bili, TP)     CBC with platelets      9. Cervical high risk HPV (human papillomavirus) test positive  R87.810       10. Spotting between menses  N92.3 TSH with free T4 reflex     Prolactin     US Pelvic Complete with Transvaginal     TSH with free T4 reflex     Prolactin      11. Nasal congestion  R09.81 Adult ENT  Referral      12. Hyperglycemia  R73.9 Hemoglobin A1c     Hemoglobin A1c        Great job with weight loss!  Recommend 10,000 steps a day or 45 minutes of aerobic activity daily.  Recommend tracking calories and lower carb diet.    Today do a controlled substance agreement and urine tox for phentermine.  Even on the 6 months and can have 9 more months of this medication before the body needs about a 6-month break.    We will help set it in person or virtual visit in 3 months.  If between now and then you like to go up to the next dose which is 30 mg send a [employer] message.    With your midcycle bleeding over the last 3 to 4 months I am ordering a pelvic ultrasound.  They should call you or you can call [phone] to set that up.  Also ordering blood work to include thyroid and prolactin.    If that is all normal and the spotting persists I will refer to gynecology and I will discuss a saline ultrasound and or a hysteroscopy and D&C.     ENT consult for the chronic nasal congestion and the concern of possible deviated septum.    We will plan an echo or heart ultrasound in 2026.  That is for the family history of hypertrophic cardiomyopathy.    I am glad to see dermatology routinely with history of dysplastic nevi.    Patient has been advised of split billing requirements and indicates understanding: Yes      COUNSELING:  Reviewed preventive health counseling, as reflected in patient instructions       Regular exercise        Healthy diet/nutrition        She reports that she has never smoked. She has never used smokeless tobacco.          Harleen Rivera MD  [facility]

## 2023-12-07 LAB
ALBUMIN SERPL BCG-MCNC: 4.5 G/DL (ref 3.5–5.2)
ALP SERPL-CCNC: 69 U/L (ref 40–150)
ALT SERPL W P-5'-P-CCNC: 22 U/L (ref 0–50)
ANION GAP SERPL CALCULATED.3IONS-SCNC: 10 MMOL/L (ref 7–15)
AST SERPL W P-5'-P-CCNC: 18 U/L (ref 0–45)
BILIRUB SERPL-MCNC: 0.4 MG/DL
BUN SERPL-MCNC: 9.7 MG/DL (ref 6–20)
CALCIUM SERPL-MCNC: 9.5 MG/DL (ref 8.6–10)
CHLORIDE SERPL-SCNC: 102 MMOL/L (ref 98–107)
CHOLEST SERPL-MCNC: 187 MG/DL
CREAT SERPL-MCNC: 0.62 MG/DL (ref 0.51–0.95)
DEPRECATED HCO3 PLAS-SCNC: 26 MMOL/L (ref 22–29)
EGFRCR SERPLBLD CKD-EPI 2021: >90 ML/MIN/1.73M2
FASTING STATUS PATIENT QL REPORTED: ABNORMAL
GLUCOSE SERPL-MCNC: 96 MG/DL (ref 70–99)
HDLC SERPL-MCNC: 55 MG/DL
LDLC SERPL CALC-MCNC: 114 MG/DL
NONHDLC SERPL-MCNC: 132 MG/DL
POTASSIUM SERPL-SCNC: 4.1 MMOL/L (ref 3.4–5.3)
PROLACTIN SERPL 3RD IS-MCNC: 16 NG/ML (ref 5–23)
PROT SERPL-MCNC: 7.6 G/DL (ref 6.4–8.3)
SODIUM SERPL-SCNC: 138 MMOL/L (ref 135–145)
TRIGL SERPL-MCNC: 88 MG/DL
TSH SERPL DL<=0.005 MIU/L-ACNC: 0.81 UIU/ML (ref 0.3–4.2)
VIT D+METAB SERPL-MCNC: 42 NG/ML (ref 20–50)

## 2023-12-08 LAB
BKR LAB AP GYN ADEQUACY: NORMAL
BKR LAB AP GYN INTERPRETATION: NORMAL
BKR LAB AP HPV REFLEX: NORMAL
BKR LAB AP LMP: NORMAL
BKR LAB AP PREVIOUS ABNORMAL: NORMAL
PATH REPORT.COMMENTS IMP SPEC: NORMAL
PATH REPORT.COMMENTS IMP SPEC: NORMAL
PATH REPORT.RELEVANT HX SPEC: NORMAL

## 2023-12-13 ENCOUNTER — PATIENT OUTREACH (OUTPATIENT)
Dept: FAMILY MEDICINE | Facility: CLINIC | Age: 38
End: 2023-12-13
Payer: COMMERCIAL

## 2023-12-27 ENCOUNTER — HOSPITAL ENCOUNTER (OUTPATIENT)
Dept: ULTRASOUND IMAGING | Facility: HOSPITAL | Age: 38
Discharge: HOME | End: 2023-12-27
Attending: FAMILY MEDICINE | Admitting: FAMILY MEDICINE
Payer: COMMERCIAL

## 2023-12-27 DIAGNOSIS — N92.0 SPOTTING BETWEEN MENSES: ICD-10-CM

## 2023-12-27 PROCEDURE — 76830 TRANSVAGINAL US NON-OB: CPT

## 2023-12-27 NOTE — RESULT ENCOUNTER NOTE
Patient updated by [employer] message with imaging results.       Dr. Miguel Mcclelland is out of the office this week. Your ultrasound is normal.  Ruma Finch, DO

## 2024-01-09 ENCOUNTER — MYC MEDICAL ADVICE (OUTPATIENT)
Dept: FAMILY MEDICINE | Facility: CLINIC | Age: 39
End: 2024-01-09
Payer: COMMERCIAL

## 2024-01-09 DIAGNOSIS — Z79.899 HIGH RISK MEDICATION USE: Primary | ICD-10-CM

## 2024-01-10 RX ORDER — PHENTERMINE HYDROCHLORIDE 30 MG/1
30 CAPSULE ORAL EVERY MORNING
Qty: 30 CAPSULE | Refills: 2 | Status: SHIPPED | OUTPATIENT
Start: 2024-01-10 | End: 2024-03-06

## 2024-01-10 NOTE — TELEPHONE ENCOUNTER
"Per 12/6 OV notes regarding phentermine,   \"We will help set it in person or virtual visit in 3 months. If between now and then you like to go up to the next dose which is 30 mg send a [employer] message.\"    Abi James RN    "

## 2024-01-30 ENCOUNTER — E-VISIT (OUTPATIENT)
Dept: FAMILY MEDICINE | Facility: CLINIC | Age: 39
End: 2024-01-30
Payer: COMMERCIAL

## 2024-01-30 DIAGNOSIS — R11.2 NAUSEA AND VOMITING, UNSPECIFIED VOMITING TYPE: Primary | ICD-10-CM

## 2024-01-30 PROCEDURE — 99421 OL DIG E/M SVC 5-10 MIN: CPT | Performed by: FAMILY MEDICINE

## 2024-01-30 RX ORDER — ONDANSETRON 4 MG/1
4 TABLET, ORALLY DISINTEGRATING ORAL EVERY 6 HOURS PRN
Qty: 30 TABLET | Refills: 3 | Status: SHIPPED | OUTPATIENT
Start: 2024-01-30 | End: 2024-06-05

## 2024-01-30 NOTE — PATIENT INSTRUCTIONS
Thank you for choosing us for your care. I have placed an order for a prescription so that you can start treatment. View your full visit summary for details by clicking on the link below. Your pharmacist will able to address any questions you may have about the medication.     If you're not feeling better within 5-7 days, please schedule an appointment.  You can schedule an appointment right here in [facility], or call [phone]  If the visit is for the same symptoms as your eVisit, we'll refund the cost of your eVisit if seen within seven days.

## 2024-03-06 ENCOUNTER — VIRTUAL VISIT (OUTPATIENT)
Dept: FAMILY MEDICINE | Facility: CLINIC | Age: 39
End: 2024-03-06
Payer: COMMERCIAL

## 2024-03-06 DIAGNOSIS — Z79.899 HIGH RISK MEDICATION USE: Primary | ICD-10-CM

## 2024-03-06 PROCEDURE — 99213 OFFICE O/P EST LOW 20 MIN: CPT | Mod: 95 | Performed by: FAMILY MEDICINE

## 2024-03-06 RX ORDER — PHENTERMINE HYDROCHLORIDE 30 MG/1
30 CAPSULE ORAL EVERY MORNING
Qty: 30 CAPSULE | Refills: 2 | Status: SHIPPED | OUTPATIENT
Start: 2024-03-06 | End: 2024-06-05

## 2024-03-06 NOTE — PROGRESS NOTES
"Melisa is a 38 year old who is being evaluated via a billable video visit.      How would you like to obtain your AVS? MyChart  If the video visit is dropped, the invitation should be resent by: Text to cell phone: [phone]  Will anyone else be joining your video visit? No          Assessment & Plan       ICD-10-CM    1. High risk medication use  Z79.899 phentermine 30 MG capsule        She will continue 30 mg of phentermine and monitor the side effects of dry mouth.    Continue with daily exercise and monitoring her calorie and carb intake.    She can add 6 more prescriptions of phentermine before she needs to take a break.    Prescription monitoring program reviewed and patient following the controlled substance agreement.    Set follow-up video visit Wed. June 5 at 9:20.      20 minutes spent by me on the date of the encounter doing chart review, history and exam, documentation and further activities per the note      BMI  Estimated body mass index is 31.72 kg/m  as calculated from the following:    Height as of 12/6/23: 1.651 m (5' 5\").    Weight as of 12/6/23: 86.5 kg (190 lb 9.6 oz).   Weight management plan: Discussed healthy diet and exercise guidelines          Subjective   Melisa is a 38 year old, presenting for the following health issues:  Recheck Medication (Med check)        3/6/2024    10:59 AM   Additional Questions   Roomed by Angela Ann CMA     History of Present Illness       Reason for visit:  Check in on medications    She eats 2-3 servings of fruits and vegetables daily.She consumes 0 sweetened beverage(s) daily.She exercises with enough effort to increase her heart rate 10 to 19 minutes per day.  She exercises with enough effort to increase her heart rate 5 days per week.   She is taking medications regularly.     High risk med use: She is on phentermine 30 mg.  She finds it working well for appetite suppression.  She is getting quite a dry mouth from it.  When she was on the 15 mg she " had dry mouth but it went away but is persisting on the 30 mg.  She would still like to stay on the 30 mg because she has lost 10 pounds.  She is exercising by walking 20 minutes 5 days a week and getting in 10,000 steps 3 to 4 days.  No chest pain, palpitations or shortness of breath.  Starting weight 205 to 210 so she is down 25 to 30 pounds.  Prescription monitoring program reviwed and she has had 9 prescriptions of phentermine.  Is up-to-date on her controlled substance agreement and urine tox.                Objective    Vitals - Patient Reported  Weight (Patient Reported): 81.6 kg (180 lb)      Vitals:  No vitals were obtained today due to virtual visit.  Pt weight 180, dwn 10 since Dec. Total weight loss 25-30!    Physical Exam   GENERAL: alert and no distress            Video-Visit Details    Type of service:  Video Visit     Originating Location (pt. Location): Home    Distant Location (provider location):  On-site  Platform used for Video Visit: Willem  Signed Electronically by: Harleen Rivera MD

## 2024-03-06 NOTE — PATIENT INSTRUCTIONS
She will continue 30 mg of phentermine and monitor the side effects of dry mouth.    Continue with daily exercise and monitoring her calorie and carb intake.    She can add 6 more prescriptions of phentermine before she needs to take a break.    Prescription monitoring program reviewed and patient following the controlled substance agreement.    Set follow-up video visit Wed. June 5 at 9:20.

## 2024-03-28 ENCOUNTER — LAB (OUTPATIENT)
Dept: LAB | Facility: CLINIC | Age: 39
End: 2024-03-28
Payer: COMMERCIAL

## 2024-03-28 ENCOUNTER — E-VISIT (OUTPATIENT)
Dept: FAMILY MEDICINE | Facility: CLINIC | Age: 39
End: 2024-03-28
Payer: COMMERCIAL

## 2024-03-28 DIAGNOSIS — J02.9 SORE THROAT: Primary | ICD-10-CM

## 2024-03-28 DIAGNOSIS — J02.9 SORE THROAT: ICD-10-CM

## 2024-03-28 PROCEDURE — 99421 OL DIG E/M SVC 5-10 MIN: CPT | Performed by: FAMILY MEDICINE

## 2024-03-28 PROCEDURE — 87651 STREP A DNA AMP PROBE: CPT | Performed by: FAMILY MEDICINE

## 2024-03-28 PROCEDURE — 87635 SARS-COV-2 COVID-19 AMP PRB: CPT

## 2024-03-28 PROCEDURE — 87804 INFLUENZA ASSAY W/OPTIC: CPT | Performed by: FAMILY MEDICINE

## 2024-03-28 NOTE — TELEPHONE ENCOUNTER
Ordered strep, flu and Covid, thanks [facility]!  Harleen Rivera MD  Provider E-Visit time total (minutes): 8

## 2024-03-28 NOTE — TELEPHONE ENCOUNTER
Patient at the [facility] with her daughter for daughter's appointment  Lab will collect, strep, flu, and covid swabs and hold them until orders are placed  Please place preferred orders and if covid and flu are not needed, lab will discard    Riley Whitley, Clinic RN  .[facility]

## 2024-03-29 LAB — SARS-COV-2 RNA RESP QL NAA+PROBE: NEGATIVE

## 2024-05-07 ENCOUNTER — OFFICE VISIT (OUTPATIENT)
Dept: INTERNAL MEDICINE | Facility: CLINIC | Age: 39
End: 2024-05-07
Payer: COMMERCIAL

## 2024-05-07 VITALS
RESPIRATION RATE: 18 BRPM | DIASTOLIC BLOOD PRESSURE: 70 MMHG | HEART RATE: 91 BPM | TEMPERATURE: 98.4 F | SYSTOLIC BLOOD PRESSURE: 118 MMHG | HEIGHT: 65 IN | OXYGEN SATURATION: 98 % | BODY MASS INDEX: 29.44 KG/M2 | WEIGHT: 176.7 LBS

## 2024-05-07 DIAGNOSIS — Z91.09 ENVIRONMENTAL ALLERGIES: ICD-10-CM

## 2024-05-07 DIAGNOSIS — J06.9 VIRAL URI WITH COUGH: Primary | ICD-10-CM

## 2024-05-07 PROCEDURE — 99213 OFFICE O/P EST LOW 20 MIN: CPT | Performed by: INTERNAL MEDICINE

## 2024-05-07 RX ORDER — SWAB
SWAB, NON-MEDICATED MISCELLANEOUS
COMMUNITY

## 2024-05-07 RX ORDER — BENZONATATE 200 MG/1
200 CAPSULE ORAL 3 TIMES DAILY PRN
Qty: 30 CAPSULE | Refills: 0 | Status: SHIPPED | OUTPATIENT
Start: 2024-05-07

## 2024-05-07 RX ORDER — MULTIVIT WITH MINERALS/LUTEIN
TABLET ORAL
COMMUNITY

## 2024-05-07 ASSESSMENT — PAIN SCALES - GENERAL: PAINLEVEL: MILD PAIN (2)

## 2024-05-07 NOTE — ASSESSMENT & PLAN NOTE
Ongoing, seems to have overlying viral URI as noted elsewhere.  - Will continue claritin 10 mg daily  - Increasing nasacort briefly and adding saline sinus rinses

## 2024-05-07 NOTE — PROGRESS NOTES
Assessment & Plan   Problem List Items Addressed This Visit          Respiratory    Viral URI with cough - Primary     Patient presents with 6 days of nasal congestion, PND, cough and headaches. Discussed with patient that this does seem most c/w with viral URI on top of chronic seasonal allergic rhinitis.   - Would recommend continuing daily claritin  - Can increase nasacort to BID  - Start nightly saline sinus rinses  - Try benzonatate 200 mg TID PRN for cough  - Can also try OTC mucinex, robitussin or dayquil/nyquil   - If symptoms not starting to improve by end of week (around day 9/10), instructed to send message, would consider treating for overlying bacterial sinus infection at that time, however at this point in symptoms still feel most likely viral and would not benefit from antibiotics today          Relevant Medications    benzonatate (TESSALON) 200 MG capsule       Other    Environmental allergies     Ongoing, seems to have overlying viral URI as noted elsewhere.  - Will continue claritin 10 mg daily  - Increasing nasacort briefly and adding saline sinus rinses           Prescription drug management    Marco Vincent is a 39 year old, presenting for the following health issues:  History of Present Illness (Patient think she may have allergies. Congested nose, flare ups, headaches and coughing. /Strep going around child's school. Patient took Covid test. Result was negative. )        5/7/2024     9:47 AM   Additional Questions   Roomed by Kane Hawkins MA   Accompanied by Self, Daughter     History of Present Illness     Reason for visit:  I believe either allergies or a sinus infection, but I want to be seen to make sure it s not something else. I have an uncontrollable cough, a ton of congestion, headaches and eaking uo with crusty eyes since last Wednesday.    Symptom onset:  3-7 days ago  Symptoms include:  Cough, congestion, headaches, crusty eyes  Symptom intensity:  Moderate  Symptom  "progression:  Worsening  Had these symptoms before:  Yes  Has tried/received treatment for these symptoms:  Yes  Previous treatment was successful:  Yes  Prior treatment description:  Sinus infection med, cough syruo, inhaler  What makes it worse:  Dry rooms, talking a lot  What makes it better:  Showering/steam, cough drops, water    Uncontrolled cough, congestion. Worse at night. A lot of pressure in face associated ear pain. No fevers. Had chills Friday/Saturday evening.     Longstading seasonal allergies. Takes daily claritin (has been doing daily for years). Using nasacort every morning (not daily in winter, spring is worst). Is doing saline mists every other day.     Doesn't remember flare this bad in the past.     Youngest daughter has a runny nose. Strep in schools of older daughters but her kids aren't sick.     Started last Wednesday evening    She eats 2-3 servings of fruits and vegetables daily.She consumes 0 sweetened beverage(s) daily.She exercises with enough effort to increase her heart rate 10 to 19 minutes per day.  She exercises with enough effort to increase her heart rate 5 days per week.     She is taking medications regularly.      Review of Systems  Constitutional, neuro, ENT, endocrine, pulmonary, cardiac, gastrointestinal, genitourinary, musculoskeletal, integument and psychiatric systems are negative, except as otherwise noted.      Objective    /70 (BP Location: Right arm, Patient Position: Sitting, Cuff Size: Adult Regular)   Pulse 91   Temp 98.4  F (36.9  C) (Oral)   Resp 18   Ht 1.651 m (5' 5\")   Wt 80.2 kg (176 lb 11.2 oz)   LMP 04/19/2024 (Exact Date)   SpO2 98%   BMI 29.40 kg/m    Body mass index is 29.4 kg/m .  Physical Exam   GENERAL: alert and no distress  EYES: Eyes grossly normal to inspection and conjunctivae and sclerae normal  HENT: ear canals and TM's normal, b/l nares with erythema and boggy inferior turbinates and mouth without ulcers or lesions, mild TTP " over maxillary sinuses   NECK: no adenopathy, no asymmetry, masses, or scars  RESP: lungs clear to auscultation - no rales, rhonchi or wheezes  CV: regular rate and rhythm, normal S1 S2, no S3 or S4, no murmur, click or rub  MS: no gross musculoskeletal defects noted, no edema  SKIN: no suspicious lesions or rashes on exposed skin   NEURO: Normal strength and tone, mentation intact and speech normal  PSYCH: mentation appears normal, affect normal/bright            Signed Electronically by: Marla Bojorquez MD

## 2024-05-07 NOTE — PATIENT INSTRUCTIONS
Increase nasocort to twice daily.     Add saline sinus rinses ([facility]).     If symptoms not improved by Friday, send message and we can send antibiotics at that time.     Other things to try over the counter:   - Mucinex to thin nasal congestion  - Robitussin can help with cough    Can try benzonatate 200 mg up to three times daily for cough.

## 2024-05-07 NOTE — ASSESSMENT & PLAN NOTE
Patient presents with 6 days of nasal congestion, PND, cough and headaches. Discussed with patient that this does seem most c/w with viral URI on top of chronic seasonal allergic rhinitis.   - Would recommend continuing daily claritin  - Can increase nasacort to BID  - Start nightly saline sinus rinses  - Try benzonatate 200 mg TID PRN for cough  - Can also try OTC mucinex, robitussin or dayquil/nyquil   - If symptoms not starting to improve by end of week (around day 9/10), instructed to send message, would consider treating for overlying bacterial sinus infection at that time, however at this point in symptoms still feel most likely viral and would not benefit from antibiotics today

## 2024-06-05 ENCOUNTER — VIRTUAL VISIT (OUTPATIENT)
Dept: FAMILY MEDICINE | Facility: CLINIC | Age: 39
End: 2024-06-05
Payer: COMMERCIAL

## 2024-06-05 DIAGNOSIS — Z79.899 HIGH RISK MEDICATION USE: ICD-10-CM

## 2024-06-05 PROCEDURE — 99213 OFFICE O/P EST LOW 20 MIN: CPT | Mod: 95 | Performed by: FAMILY MEDICINE

## 2024-06-05 RX ORDER — PHENTERMINE HYDROCHLORIDE 30 MG/1
30 CAPSULE ORAL EVERY MORNING
Qty: 30 CAPSULE | Refills: 2 | Status: SHIPPED | OUTPATIENT
Start: 2024-06-05 | End: 2024-09-04

## 2024-06-05 NOTE — PROGRESS NOTES
Melisa is a 39 year old who is being evaluated via a billable video visit.    How would you like to obtain your AVS? MyChart  If the video visit is dropped, the invitation should be resent by: Text to cell phone: [phone]  Will anyone else be joining your video visit? No      Assessment & Plan       ICD-10-CM    1. High risk medication use  Z79.899 phentermine 30 MG capsule        Continue Phentermine 30 mg daily.  #30 and 2 refills.  Max of 2 years, so end will be June 2025.    Prescription monitoring program reviewed and patient following the controlled substance agreement.    Continue with healthy diet and exercise.    Video visit in 3 months.    Physical Dec 6 or after.                Subjective   Melisa is a 39 year old, presenting for the following health issues:  Recheck Medication (Allergy meds and phentermine )        6/5/2024     9:15 AM   Additional Questions   Roomed by Tameka HINDS CMA     History of Present Illness       Reason for visit:  Med check for phentermine    She eats 2-3 servings of fruits and vegetables daily.She consumes 0 sweetened beverage(s) daily.She exercises with enough effort to increase her heart rate 10 to 19 minutes per day.  She exercises with enough effort to increase her heart rate 6 days per week.   She is taking medications regularly.     High risk med use:  On Phentermine since June 2023.. No side effects of medication such as no chest pain palpitations or insomnia.  She is eating less.  She is exercising daily.  She is eating a healthy diet.  Down 32 lbs in June 2023!                Objective    Vitals - Patient Reported  Weight (Patient Reported): 79.8 kg (176 lb)        Physical Exam   GENERAL: alert and no distress            Video-Visit Details    Type of service:  Video Visit   Originating Location (pt. Location): Home    Distant Location (provider location):  On-site  Platform [facility] for Video Visit: [facility]  Signed Electronically by: Harleen Rivera MD

## 2024-06-05 NOTE — PATIENT INSTRUCTIONS
Continue Phentermine 30 mg daily.  #30 and 2 refills.  Max of 2 years, so end will be June 2025.    Prescription monitoring program reviewed and patient following the controlled substance agreement.    Continue with healthy diet and exercise.    Video visit in 3 months.    Physical Dec 6 or after.       [facility]  CONGENITAL HEART DISEASE CLINIC NOTE    Chief Complaint/ Reason for Visit:   * D-TGA, enlarged VSD, Fontan palliation    Cardiac and Surgical History:  - Anatomy and diagnosis:  * D-TGA  * Englarged Ventricular Septal Defect    - Surgeries:  * 2001: [facility] (SANAM Swanson/ CINDY Herrera): PA band via median sternotomy, saturations on 100% FiO2 were in the high 70s, and PA pressures distal to the band where half systemic.  * 2001: [facility] (CINDY Herrera): tightening of PA band, right atriotomy, bidirectional shunt via median sternotomy  * 12/17/2002: [facility] DIAN Gonzalez): extracardiac fontan using 18mm aortic homograft; closure of PA    - EP:  * No significant issues    HPI:  Ofelia Reyes is a 23-year-old female with D-TGA, enlarged VSD, s/p pulmonary band, bidirectional Gómez and extra-cardiac Fontan, closure of pulmonary valve, all done at [facility], with Fontan procedure done by Dr. Gonzalez.    Current anatomy consists of extracardiac fontan using 18mm aortic homograft; closure of MPA above pulmonary valve.      Last clinic visit was 12/13/2022, at which time she was clinically stable.  She had recently started work at a / and was completing an associate's degree in early childhood education.  She was also concerned about have gained weight over the past year which she felt was in part a reaction to depot provera birth control.      Presented to OSH ER on 2/25/23 with abdominal pain found to have evidence of appendicitis on CT scan. For this reason transferred to [facility]. Upon arrival hemodynamically stable.  Pt taken to OR and underwent appendectomy on 2/25/23 by general surgery  with cardiovascular anesthesia. Post did well and has recovered nicely.   POD1 her Cr was elevated and lytes were deranged, so ASA and lisinopril were  held; Cr normalized and ASA was restarted.  She was  normotensive and euvolemic, discharged home POD2, lisinopril was held.  She was seen in postdischarge followup and was doing well, at which time lisinopril was resumed.    She presents 7/18 unaccompanied for routine follow up.  She continues to feel well, fully recovered since her bout with appendicitis. No acute complaints.  Specifically denies change in functional capacity, MASSEY, orthopnea, PND, edema, swelling, presyncope, syncope, chest pain, or palpitations.  She has completes her associate's degree and will be starting on a johanny's degree next semester.  She wants to make changes in her diet for weight loss and health; counselled patient on diet modification for cardiac health.  Emphasized that her current weight is not increasing her cardiac risk and therefore diet modifications should be focused on efforts to eat a variety of micronutrient and dense foods and minimize simple sugars and highly processed products as able, but that she should not pursue severely limiting or rapid weight loss focused diets.  Recommended using mediterranean diet as a general guideline.   Referral to dietician provided..  She has stopped taking Depot-provera for birth control; currently using condoms \"sometimes\" and pull out method for birth control.  We discussed that she is at significantly elevated risk for morbid complication during pregnancy (for herself and for fetus) and encouraged her to find another form of durable, effective birth control that is better tolerated.    Presents 12/5 for follow up.  No acute cardiac complaints, Specifically denies change in functional capacity, MASSEY, orthopnea, PND, edema, swelling, presyncope, syncope, chest pain, or palpitations.  She does report she has been having significant nausea and sometimes vomiting impacting her ability to eat, seems worst with greasy or high fat foods. 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  She continues to intermittently use barrier methods for birth control; rivka elfirms she does not desire pregnancy at this time but probably will in the future.  Her PCP has recommended she change her lisinopril given risk of pregnancy.       ASSESSMENT:  23 year old young woman with history of dTGA and large VSDs (functionally absent septum), palliated via conversion to extracardiac Fontan and bidirectional Gómez as described above.  She presents alone today for routine follow up and is overall doing well without acute cardiac complaints.    We discussed that she should see GI/Hepatology regarding her liver health with Fontan, and should also likely have endoscopy given her persistent GI symptoms.  She does not have other stigmata of severe congestion or liver  dysfunction, but does have early cirrhotic morphology on US.  Did not have gallstones or sludge on liver US earlier this year, so billiary colic less likely. I also encouraged her to remain abstinent from cannabis for 1-2 weeks to see if symptoms improve, as some of the features she describes could be canabis hyperemesis given her chronic heavy cannabis use.  I do also have some concern there could be a mood or psych component, as she has made negative comments about her weight during multiple visits; again reassured patient she remains in a normal and healthy BMI range.    Again counselled her regarding her significantly elevated pregnancy risk. While she is not cyanotic and has not had clinical CHF, given her Fontan physiology and impaired peak VO2 she would still have significant risk of personal morbidity,  labor, preeclampsia, placental abruption, or stilbirth.  Strongly encouraged her to discuss durable progestin-based birth control with her primary physician.  For now, however, she is resistant to resuming birth control and thinks she would probably try to carry a child to term if she conceived, and there is not a strong cardiac  indication to be on lisinopril over other antihypertensives, so it is reasonable to switch her lisinopril to amlodipine.    - Discontinue lisinopril, start amlodipine 5 mg daily  - Referral to Dr. Aracely Stern, Hepatology.    - Fontan labs  - schedule CPET    - Anatomic Class III (Great Complexity), Physiologic class B (Non-limiting symptoms, well-controlled arrhythmias)  - This patient does require prophylaxis against spontaneous bacterial endocarditis per 2007 ACC/AHA guidelines, they should take Amoxicillin 2g 30-60 minutes before dental cleanings  - Family Planning: Discussed with patient that their cardiac condition puts them at elevated risk if they were to become pregnant.  Recommended highly effective, durable birth control such as IUD or Nexplanon to ensure any pregnancy is planned and made as safe as possible for mother and child. Discussed that estrogen-containing birth control methods may increase risk of thrombosis and are contraindicated.  Non-hormonal or progestin-only methods are safe.  Of those, the IUD (both copper and hormonal) and the Nexplanon implant are the most effective.  - Exercise Recommendations:  For this patient with Fontan-type circulation, regular exercise involving the use of the leg musculature and activation of the calf muscle pump to promote active venous return is an essential element of their health maintenance.  We reemphasized the importance of trying to get at least 20-30 minutes of moderate exercise daily.   - Perisurgical Management: This patient has moderately elevated cardiovascular risk associated with general anesthesia and surgery.  No additional cardiac testing is indicated prior to surgery., Given the complexity of this patient's congenital heart disease, we would recommend non-emrgent surgeries be performed at a center with comprehensive adult congenital heart disease care available, including cardiac anesthesia familiar with intraoperative management the patient's  anatomy and the availability of inpatient adult congenital cardiology consultation. and We would recommend cardiac anesthesia consultation prior to any procedure requiring general anesthesia.      - Follow up: Return to clinic in 6 months        REVIEW OF SYSTEMS:  Constitutional: Negative for fever, chills, change in appetite or fatigue.  Skin: Negative for rash or wounds.  HEENT: Negative for eye drainage, rhinorrhea, ear pain, sore throat or neck pain.  Respiratory: Negative cough, wheezing or shortness of breath.  Cardiovascular: Negative for chest pain, chest pressure, palpitations or diaphoresis.  Gastrointestinal: Negative for nausea, vomiting, diarrhea, abdominal pain, black or tarry stools.  Genitourinary: Negative for dysuria, urgency, frequency, hematuria or flank pain.  Extremities:  Negative for joint swelling or joint pain.  Neurologic:  Negative for change in sensory or motor function.  Negative for headache, change in gait, vertigo, vision or speech.  Endocrine: Negative for heat or cold intolerance, weight loss or gain.  Hematological: Negative for bleeding, bruising or lymphadenopathy.  Psychiatric: Negative for change in affect, anxiety, depression, mentation or sleep disturbance.    Past Medical History:   Diagnosis Date    Bacterial vaginitis 2/3/2021    Breakthrough bleeding 5/7/2020    On Depo Provera    Chronic folliculitis 6/28/2021    DILV (double inlet left ventricle) 2001    d-TGA, large VSD, straddling left-sided AV valve and DILV    Leukopenia 1/9/2018    Plantar wart of left foot 6/9/2021    Seizures (CMD) 5/10/2016    Sinus bradycardia 11/10/2015    Sterile pyuria 4/2/2021    Unprotected sexual intercourse 6/2/2023     Past Surgical History:   Procedure Laterality Date    Appendectomy  02/25/2023    Bidirectional karo w/ perfusion  2001    [facility] tightening of PA band, right atriotomy, bidirectional shunt via median sternotomy    Cardiac  catherization Bilateral 2001    [facility]:    Fontan procedure, extracardiac  12/17/2002    [facility] DIAN Gonzalez): extracardiac fontan using 18mm aortic homograft; closure of PA    Pulmonary artery banding  2001    [facility] (SANAM Swanson/ CINDY Herrera): PA band via median sternotomy, saturations on 100% FiO2 were in the high 70s, and PA pressures distal to the band where half systemic.         Current Outpatient Medications   Medication Sig Dispense Refill    albuterol 108 (90 Base) MCG/ACT inhaler Inhale 2 puffs into the lungs every 4 hours as needed for Shortness of Breath or Wheezing. 1 each 1    Prenatal Vit-Fe Fumarate-FA (multivitamin & mineral w/folic acid- PRENATAL) 27-0.8 MG tablet Take 1 tablet by mouth daily. 90 tablet 3    lisinopril (ZESTRIL) 5 MG tablet Take 1 tablet by mouth daily. 90 tablet 3    Ferrous Sulfate (Iron) 325 (65 Fe) MG Tab Take 1 tablet by mouth daily. 90 tablet 3    EPINEPHrine 0.3 MG/0.3ML auto-injector Inject 0.3 mLs into the muscle as needed for Anaphylaxis. 1 each 1    diphenhydrAMINE (BENADRYL) 25 MG tablet Take 1 tablet by mouth every 6 hours as needed for Allergies. 30 tablet 0    loratadine (CLARITIN) 10 MG tablet Take 10 mg by mouth daily as needed for Allergies. Indications: Hayfever      acetaminophen (TYLENOL) 500 MG tablet Take 500-1,000 mg by mouth every 6 hours as needed for Pain.      ibuprofen (MOTRIN) 400 MG tablet Take 1 tablet by mouth every 6 hours as needed for Pain.      VITAMIN D, CHOLECALCIFEROL, PO Take 1 capsule by mouth daily.      ascorbic acid (VITAMIN C) 250 MG tablet Take 250 mg by mouth daily.      aspirin 325 MG tablet Take 325 mg by mouth daily. Prescribed by cardiologist 2018       No current facility-administered medications for this visit.       ALLERGIES:   Allergen Reactions    Shellfish Allergy   (Food Or Med) ANAPHYLAXIS     Social History     Social History Narrative    Lives (with): Stays with  grandpa when working (3-4days/wk). She lives with her parents and 13 y/o brother when she is at home.    School: [employer], early childhood education, Grad May 2023    Occupation: [employer]    Exercise: Not currently    Nicotine/vaping/smoking exposure: Denies    Marijuana: every other day    Alcohol: rare, socially    Illicits: Denies    Dental: 6-12 months, [facility]     Relationships: Yes    Pregnancy: G0, No birth control       Vital Signs:    Visit Vitals  /79 (BP Location: RUE - Right upper extremity, Patient Position: Sitting, Cuff Size: Regular)   Pulse 67   Ht 4' 11.17\" (1.503 m)   Wt 52.4 kg (115 lb 8.3 oz)   LMP 10/17/2023 (Approximate)   SpO2 98%   BMI 23.20 kg/m²       General:  Alert, cooperative, conversive. No acute distress.  Skin:  Warm and dry without rash.    Head:  Normocephalic-atraumatic.   Neck:  Trachea is midline. No adenopathy.  Normal thyroid without mass or tenderness..  Eyes:  Normal conjunctivae and sclerae.  Pupils equal, round, reactive to light.  Extraocular movements intact.    ENT:   Mucous membranes are moist.  Normal tympanic membranes and external auditory canals bilaterally.  No pharyngeal erythema or exudate.  No facial tenderness.  Normal nasal mucosa.  Cardiovascular:  Symmetrical pulses.  Regular rate and rhythm without murmur.  Respiratory:   Normal respiratory effort.  Clear to auscultation.  No wheezes, rales or rhonchi.  Gastrointestinal:  Soft and nontender.  Normal bowel sounds.  No hepatomegaly or splenomegaly.  No guarding or rebound tenderness.  No masses.  Musculoskeletal:  No deformity or edema.  No tenderness to palpation.    Back:   Normal alignment.  No costovertebral angle tenderness or midline bony tenderness.  Neurologic:   Oriented times 4.  Cranial nerves 2-12 are intact.  No nystagmus.  No focal deficits or lateralizing signs.  Normal gait without ataxia.  Psychiatric:   Cooperative.  Appropriate mood and affect.  Normal  judgment.      Labs:     02/26/23 05:16 02/27/23 05:47 03/03/23 11:34   Sodium 152 (H) 137 138   Potassium 5.2 (H) 3.6 4.0   Chloride 126 (H) 111 (H) 112 (H)   CO2 22 19 (L) 22   ANION GAP 9 11 8   Glucose 134 (H) 147 (H) 94   BUN 9 9 8   Creatinine 0.83 0.72 0.91   BUN/CREATININE RATIO 11 13 9   Glomerular Filtration Rate >90 >90 >90   CALCIUM 9.1 8.7 9.8   MAGNESIUM  2.1    TOTAL BILIRUBIN  0.9    AST/SGOT  16    ALT/SGPT  26    ALK PHOSPHATASE  69    Albumin  3.0 (L)    GLOBULIN  3.9    A/G Ratio, Serum  0.8 (L)    TOTAL PROTEIN  6.9    WBC 6.8 9.0    RBC 3.81 (L) 3.77 (L)    HGB 12.1 12.0    HCT 36.7 36.7    MCV 96.3 97.3    MCH 31.8 31.8    MCHC 33.0 32.7    RDW-SD 45.8 46.1    RDW-CV 12.8 12.9    PLT 97 (L) 108 (L)    NRBC 0 0    Neutrophil 91     LYMPH 7     MONO 2     EOSIN 0     BASO 0     Absolute Neutrophil 6.2     Absolute Lymph 0.5 (L)     Absolute Mono 0.1 (L)     Absolute Eos 0.0     Absolute Baso 0.0     Immature Granulocytes 0     Absolute Immature Granulocytes 0.0       Imaging/Recent Studies:  TTE 12/5/2023:  Low velocity phasic flow in the superior vena cava and bilateral pulmonary arteries,  Low velocity phasic flow in the inferior vena cava and Fontan conduit.  Prominence of the IVC-fontan suture line appearring to cause mild narrowing without significant flow acceleration.  Large secundum atrial septal defect with left to right shunt.  Mild tricuspid regurgitation.  Trivial mitral regurgitation.  Normal size right and mildly hypoplastic left ventricle that are functionaly a single chamber. Multiple large muscular ventricular septal defects rendering the ventricular septum functionally absent.  Qualitatively normal combined systolic function.  No pericardial effusion      In comparison to prior echocardiogram, no significant changes    EKG 06/24/2022: 98 bpm, sinus rhythm, left axis deviation, RVH, , , QTc  386 ms    Echocardiogram 12/13/2022:   Low velocity phasic flow in the  superior vena cava and left pulmonary arteries, RPA not well seen this study, no obstruction at the superior cavopulmonary anastomosis.  Low velocity phasic flow in the inferior vena cava.  Fontan conduit not well visualized.  Large secundum atrial septal defect with left to right shunt.  Mild tricuspid regurgitation with two jets.  Trivial mitral regurgitation.  Large functionally single ventricle of poorly defined morphology.  Likely multiple large ventricular septal defect rendering the ventricular septum functionally absent.  Prominent heavy trabeculations throughout the right side of the chamber.  Qualitatively normal systolic function.  No pericardial effusion      In comparison to prior echocardiogram, no significant changes    Echocardiogram 6/24/2022:  Low velocity phasic flow in the superior vena cava and branch pulmonary arteries, no obstruction at the superior cavopulmonary anastomosis.  Low velocity phasic flow in the inferior vena cava.  Fontan conduit not well visualized. Large secundum atrial septal defect with left to right shunt. Mild tricuspid regurgitation with two jets. Trivial mitral regurgitation. Large functionally single ventricle of poorly defined morphology.  Likely multiple large ventricular septal defect rendering the ventricular septum functionally absent.  Prominent heavy trabeculations throughout the right side of the chamber.  Qualitatively normal systolic function. No pericardial effusion    HOLTER 6/27/2022-6/30/2022:  Summary: Mostly sinus rhythms with an average heart rate of 62 bpm ().  There is 0.03% (7) PACs and 0.15% (37) monomorphic PVCs with no couplets or runs.  There were was 1 diary notation for unclear symptoms consisting of sinus rhythms at 81 bpm.  Conclusion: Sinus rhythms with rare ectopy and decreased an average heart rate when comapred with prior Holter 3/31/2021.    CMR 06/21/2021:  Overall image quality was suboptimal due to the patient's inability to  perform breath-holds.    1.   Complex ventricular anatomy may represent a large single ventricle of indeterminate morphology versus near-absent ventricular septum with biventricular heavy trabeculations.  2.   Normal size and function of atrioventricular valves (right AV valve larger than left).  3.   Malposed great vessels in an A-P orientation.  4.   Main pulmonary artery is oversewn with no forward flow.  5.   Widely patent superior cavopulmonary anastomosis.  6.   Fontan conduit has mild narrowing at the IVC and pulmonary artery junctions with no significant obstruction to flow.  7.   Well-developed branch pulmonary arteries.  8.   No evidence of significant aortopulmonary or venovenous collaterals by flow quantification.  9.   No delayed gadolinium enhancement to suggest the presence of inflammation, infiltration, or fibrosis.    CPX 12/01/2020:  - This is a maximal test  - Pulmonary limitations exist with a drop in FEV1 post  exercise   suggestive of exercise   induced bronchospasm (rachel FEV1 1.72 L; -21.5%). There is no desaturation with exercise  .  Breathing reserve is normal.  - Cardiac limitations are noted with a lower than expected peak VO2 (19.8 mL/KG/minute; 51%), chronotropic incompetence (peak heart rate 157 bpm; 78%) and VO2/pulse (7 mL/beat; 66%). Patient demonstrated poor endurance (6 minutes), poor work capacity (7 METS), with blunted heart rate and blood pressure response in the absence of arrhythmia and ischemia.  ROGELIO CLASS: B. Functional impairment during incremental treadmill testing and heart failure: Rogelio Smith classification    Otto Lee MD  [facility]  Advanced Heart Failure, Mechanical Circulatory Support and Transplant Cardiology  [facility] Program Office Phone: [phone]   CHF Office Phone: [phone]    BILLING:  I spent a total of 60 minutes, reviewing patients history, previous testing, performing a physical exam, discussing assessment  with the patient, and then developing a joint plan of action with the patient and family, followed by detailed documentation of this evaluation.

## 2024-09-04 DIAGNOSIS — Z79.899 HIGH RISK MEDICATION USE: ICD-10-CM

## 2024-09-04 RX ORDER — PHENTERMINE HYDROCHLORIDE 30 MG/1
30 CAPSULE ORAL EVERY MORNING
Qty: 30 CAPSULE | Refills: 0 | Status: SHIPPED | OUTPATIENT
Start: 2024-09-04 | End: 2024-09-12

## 2024-09-12 ENCOUNTER — VIRTUAL VISIT (OUTPATIENT)
Dept: FAMILY MEDICINE | Facility: CLINIC | Age: 39
End: 2024-09-12
Payer: COMMERCIAL

## 2024-09-12 DIAGNOSIS — Z79.899 HIGH RISK MEDICATION USE: ICD-10-CM

## 2024-09-12 PROCEDURE — 99213 OFFICE O/P EST LOW 20 MIN: CPT | Mod: 95 | Performed by: FAMILY MEDICINE

## 2024-09-12 RX ORDER — PHENTERMINE HYDROCHLORIDE 30 MG/1
30 CAPSULE ORAL EVERY MORNING
Qty: 30 CAPSULE | Refills: 3 | Status: SHIPPED | OUTPATIENT
Start: 2024-09-12

## 2024-09-12 NOTE — PATIENT INSTRUCTIONS
Continue phentermine 30 mg daily.  Given 30 tabs and 3 refills.    She has a physical set in January.    Can check glucose and A1c at her physical, sooner if concerns but reassured her that dry mouth and increased thirst can be a side effect of phentermine.    Continue with healthy diet and exercise.

## 2024-09-12 NOTE — PROGRESS NOTES
"Melisa is a 39 year old who is being evaluated via a billable video visit.    How would you like to obtain your AVS? MyChart  If the video visit is dropped, the invitation should be resent by: Text to cell phone: [phone]  Will anyone else be joining your video visit? No      Assessment & Plan       ICD-10-CM    1. High risk medication use  Z79.899 phentermine 30 MG capsule        Continue phentermine 30 mg daily.  Given 30 tabs and 3 refills.    She has a physical set in January.    Can check glucose and A1c at her physical, sooner if concerns but reassured her that dry mouth and increased thirst can be a side effect of phentermine.    Continue with healthy diet and exercise.          BMI  Estimated body mass index is 29.4 kg/m  as calculated from the following:    Height as of 5/7/24: 1.651 m (5' 5\").    Weight as of 5/7/24: 80.2 kg (176 lb 11.2 oz).   Weight management plan: Discussed healthy diet and exercise guidelines          Subjective   Melisa is a 39 year old, presenting for the following health issues:  Recheck Medication and Increased Thirst (Patient states that she has been noticing increased thirst. States that has been going on for 4-5 months. Mother was recently diagnosed with diabetes and would like to discuss. )          9/12/2024    11:54 AM   Additional Questions   Roomed by Tameka HINDS CMA     History of Present Illness       Reason for visit:  Medication check and also discuss extreme thirst/check A1C?    She eats 2-3 servings of fruits and vegetables daily.She consumes 0 sweetened beverage(s) daily.She exercises with enough effort to increase her heart rate 20 to 29 minutes per day.  She exercises with enough effort to increase her heart rate 5 days per week.   She is taking medications regularly.                 Me chck:  No side effects such as chest pain palpitations or shortness of breath.  She is having a little increased thirst and wants to ensure she does not have diabetes.  " Exercising 5 times a week, down 33 lbs. I did tell her her blood sugar was normal last December.      Objective    Vitals - Patient Reported  Weight (Patient Reported): 77.6 kg (171 lb)        Physical Exam   GENERAL: alert and no distress            Video-Visit Details    Type of service:  Video Visit   Originating Location (pt. Location): Home    Distant Location (provider location):  On-site  Platform used for Video Visit: Ronaldo  Signed Electronically by: Harleen Rivera MD

## 2024-09-27 ENCOUNTER — OFFICE VISIT (OUTPATIENT)
Dept: OTOLARYNGOLOGY | Facility: CLINIC | Age: 39
End: 2024-09-27
Payer: COMMERCIAL

## 2024-09-27 VITALS — SYSTOLIC BLOOD PRESSURE: 127 MMHG | HEART RATE: 100 BPM | DIASTOLIC BLOOD PRESSURE: 84 MMHG | OXYGEN SATURATION: 100 %

## 2024-09-27 DIAGNOSIS — J30.1 SEASONAL ALLERGIC RHINITIS DUE TO POLLEN: Primary | ICD-10-CM

## 2024-09-27 DIAGNOSIS — J01.91 ACUTE RECURRENT SINUSITIS, UNSPECIFIED LOCATION: ICD-10-CM

## 2024-09-27 PROCEDURE — 99204 OFFICE O/P NEW MOD 45 MIN: CPT | Performed by: PHYSICIAN ASSISTANT

## 2024-09-27 RX ORDER — AZELASTINE 1 MG/ML
1 SPRAY, METERED NASAL 2 TIMES DAILY
Qty: 30 ML | Refills: 11 | Status: SHIPPED | OUTPATIENT
Start: 2024-09-27

## 2024-09-27 NOTE — LETTER
"9/27/2024      Melisa Daily  [address]  [place] [address]      Dear Colleague,    Thank you for referring your patient, Melisa Daily, to the [facility]. Please see a copy of my visit note below.    Assessment & Plan    Patient with currently well-controlled seasonal allergic rhinitis , generally much worse in the wintertime which also gets recurrent sinusitis.  Will have patient add Astelin once the weather gets cold and see if that helps head-off her symptoms.  She will  let me know how she is doing and we will consider a sinus CT for worsening symptoms or recurrent sinusitis.  Problem List Items Addressed This Visit    None  Visit Diagnoses       Seasonal allergic rhinitis due to pollen    -  Primary    Relevant Medications    azelastine (ASTELIN) 0.1 % nasal spray    Acute recurrent sinusitis, unspecified location        Relevant Medications    azelastine (ASTELIN) 0.1 % nasal spray             Review of external notes as documented elsewhere in note    17 minutes spent on the date of the encounter doing chart review, history and exam, documentation and further activities per the note  {     MER Schulte  [facility]    Subjective     HPI-     Referred by PRIMARY CARE PROVIDER in December-  \"Seasonal allergies: Using Nasacort and nasal saline seems to helps some with nasasl congestion.  She has a family history of deviated septum and wonders if she may have that. \"      Gets rhinitis generally throughout the winter along with recurrent sinus infections.   using claritin, flonase and nasal saline year round, no nasal obstruction currently.  Starts when it gets cold.  No vasomotor symptoms.        Had allergy tested- allergy shots were advised but she didn't start- Allergic rhinitis with sensitivity to dust mite, grass pollen, mold, weed pollen and cat.          Review of Systems   ENT as above      Objective    /84   Pulse 100   LMP " 09/07/2024 (Approximate)   SpO2 100%     Physical Exam     Constitutional:   The patient was in no acute distress.      Head/Face:   Normocephalic and atraumatic.  No lesions or scars.     Ears:  The tympanic membranes are normal in appearance, bony landmarks are intact.  No retraction, perforation, or masses.   No fluid or purulence was seen in the external canal or the middle ear. No evidence of infection of the middle ear or external canal, cerumen was normal in appearance.    Nose:  Anterior rhinoscopy revealed midline septum with a small right sided bony spur and absence of purulence or polyps.      Mouth:  Normal tongue, floor of mouth, buccal mucosa, and palate.  No lesions, ulceration or  masses on inspection, normal voice quality      Oropharynx:  Normal mucosa, palate symmetric with normal elevation. Tonsils  not visualized       Neck:  Supple with normal laryngeal and tracheal landmarks. No palpable thyroid.     Lymphatic:  There is no palpable lymphadenopathy in the neck.                    Again, thank you for allowing me to participate in the care of your patient.        Sincerely,        MER Schulte

## 2024-09-27 NOTE — PROGRESS NOTES
"Assessment & Plan     Patient with currently well-controlled seasonal allergic rhinitis , generally much worse in the wintertime which also gets recurrent sinusitis.  Will have patient add Astelin once the weather gets cold and see if that helps head-off her symptoms.  She will  let me know how she is doing and we will consider a sinus CT for worsening symptoms or recurrent sinusitis.  Problem List Items Addressed This Visit    None  Visit Diagnoses       Seasonal allergic rhinitis due to pollen    -  Primary    Relevant Medications    azelastine (ASTELIN) 0.1 % nasal spray    Acute recurrent sinusitis, unspecified location        Relevant Medications    azelastine (ASTELIN) 0.1 % nasal spray             Review of external notes as documented elsewhere in note    17 minutes spent on the date of the encounter doing chart review, history and exam, documentation and further activities per the note  {     MER Schulte  [facility]    Subjective     HPI-     Referred by PRIMARY CARE PROVIDER in December-  \"Seasonal allergies: Using Nasacort and nasal saline seems to helps some with nasasl congestion.  She has a family history of deviated septum and wonders if she may have that. \"      Gets rhinitis generally throughout the winter along with recurrent sinus infections.   using claritin, flonase and nasal saline year round, no nasal obstruction currently.  Starts when it gets cold.  No vasomotor symptoms.        Had allergy tested- allergy shots were advised but she didn't start- Allergic rhinitis with sensitivity to dust mite, grass pollen, mold, weed pollen and cat.          Review of Systems   ENT as above      Objective    /84   Pulse 100   LMP 09/07/2024 (Approximate)   SpO2 100%     Physical Exam     Constitutional:   The patient was in no acute distress.      Head/Face:   Normocephalic and atraumatic.  No lesions or scars.     Ears:  The tympanic membranes are normal in " appearance, bony landmarks are intact.  No retraction, perforation, or masses.   No fluid or purulence was seen in the external canal or the middle ear. No evidence of infection of the middle ear or external canal, cerumen was normal in appearance.    Nose:  Anterior rhinoscopy revealed midline septum with a small right sided bony spur and absence of purulence or polyps.      Mouth:  Normal tongue, floor of mouth, buccal mucosa, and palate.  No lesions, ulceration or  masses on inspection, normal voice quality      Oropharynx:  Normal mucosa, palate symmetric with normal elevation. Tonsils  not visualized       Neck:  Supple with normal laryngeal and tracheal landmarks. No palpable thyroid.     Lymphatic:  There is no palpable lymphadenopathy in the neck.

## 2025-01-06 SDOH — HEALTH STABILITY: PHYSICAL HEALTH: ON AVERAGE, HOW MANY DAYS PER WEEK DO YOU ENGAGE IN MODERATE TO STRENUOUS EXERCISE (LIKE A BRISK WALK)?: 5 DAYS

## 2025-01-06 SDOH — HEALTH STABILITY: PHYSICAL HEALTH: ON AVERAGE, HOW MANY MINUTES DO YOU ENGAGE IN EXERCISE AT THIS LEVEL?: 30 MIN

## 2025-01-06 ASSESSMENT — SOCIAL DETERMINANTS OF HEALTH (SDOH): HOW OFTEN DO YOU GET TOGETHER WITH FRIENDS OR RELATIVES?: TWICE A WEEK

## 2025-01-08 ENCOUNTER — OFFICE VISIT (OUTPATIENT)
Dept: FAMILY MEDICINE | Facility: CLINIC | Age: 40
End: 2025-01-08
Attending: FAMILY MEDICINE
Payer: COMMERCIAL

## 2025-01-08 VITALS
OXYGEN SATURATION: 100 % | BODY MASS INDEX: 28.52 KG/M2 | TEMPERATURE: 97.7 F | DIASTOLIC BLOOD PRESSURE: 78 MMHG | RESPIRATION RATE: 16 BRPM | HEIGHT: 65 IN | WEIGHT: 171.2 LBS | HEART RATE: 85 BPM | SYSTOLIC BLOOD PRESSURE: 120 MMHG

## 2025-01-08 DIAGNOSIS — R87.810 CERVICAL HIGH RISK HPV (HUMAN PAPILLOMAVIRUS) TEST POSITIVE: ICD-10-CM

## 2025-01-08 DIAGNOSIS — D23.9 DYSPLASTIC NEVI: ICD-10-CM

## 2025-01-08 DIAGNOSIS — Z79.899 CONTROLLED SUBSTANCE AGREEMENT SIGNED: Primary | ICD-10-CM

## 2025-01-08 DIAGNOSIS — Z79.899 HIGH RISK MEDICATION USE: ICD-10-CM

## 2025-01-08 DIAGNOSIS — Z00.00 ENCOUNTER FOR PREVENTATIVE ADULT HEALTH CARE EXAMINATION: Primary | ICD-10-CM

## 2025-01-08 DIAGNOSIS — Z12.4 CERVICAL CANCER SCREENING: ICD-10-CM

## 2025-01-08 DIAGNOSIS — Z78.9 HEPATITIS B IMMUNE: ICD-10-CM

## 2025-01-08 DIAGNOSIS — E78.5 HYPERLIPIDEMIA LDL GOAL <130: ICD-10-CM

## 2025-01-08 DIAGNOSIS — R10.12 LUQ ABDOMINAL PAIN: ICD-10-CM

## 2025-01-08 LAB
ALBUMIN SERPL BCG-MCNC: 4.4 G/DL (ref 3.5–5.2)
ALP SERPL-CCNC: 60 U/L (ref 40–150)
ALT SERPL W P-5'-P-CCNC: 20 U/L (ref 0–50)
ANION GAP SERPL CALCULATED.3IONS-SCNC: 10 MMOL/L (ref 7–15)
AST SERPL W P-5'-P-CCNC: 18 U/L (ref 0–45)
BILIRUB SERPL-MCNC: 0.5 MG/DL
BUN SERPL-MCNC: 11.7 MG/DL (ref 6–20)
CALCIUM SERPL-MCNC: 9.4 MG/DL (ref 8.8–10.4)
CHLORIDE SERPL-SCNC: 101 MMOL/L (ref 98–107)
CHOLEST SERPL-MCNC: 198 MG/DL
CREAT SERPL-MCNC: 0.71 MG/DL (ref 0.51–0.95)
EGFRCR SERPLBLD CKD-EPI 2021: >90 ML/MIN/1.73M2
ERYTHROCYTE [DISTWIDTH] IN BLOOD BY AUTOMATED COUNT: 13.1 % (ref 10–15)
EST. AVERAGE GLUCOSE BLD GHB EST-MCNC: 100 MG/DL
FASTING STATUS PATIENT QL REPORTED: YES
FASTING STATUS PATIENT QL REPORTED: YES
GLUCOSE SERPL-MCNC: 99 MG/DL (ref 70–99)
HBA1C MFR BLD: 5.1 % (ref 0–5.6)
HCO3 SERPL-SCNC: 27 MMOL/L (ref 22–29)
HCT VFR BLD AUTO: 40.2 % (ref 35–47)
HDLC SERPL-MCNC: 71 MG/DL
HGB BLD-MCNC: 13.4 G/DL (ref 11.7–15.7)
LDLC SERPL CALC-MCNC: 113 MG/DL
LIPASE SERPL-CCNC: 70 U/L (ref 13–60)
MAGNESIUM SERPL-MCNC: 2.2 MG/DL (ref 1.7–2.3)
MCH RBC QN AUTO: 29.8 PG (ref 26.5–33)
MCHC RBC AUTO-ENTMCNC: 33.3 G/DL (ref 31.5–36.5)
MCV RBC AUTO: 90 FL (ref 78–100)
NONHDLC SERPL-MCNC: 127 MG/DL
PLATELET # BLD AUTO: 213 10E3/UL (ref 150–450)
POTASSIUM SERPL-SCNC: 4.1 MMOL/L (ref 3.4–5.3)
PROT SERPL-MCNC: 7.3 G/DL (ref 6.4–8.3)
RBC # BLD AUTO: 4.49 10E6/UL (ref 3.8–5.2)
SODIUM SERPL-SCNC: 138 MMOL/L (ref 135–145)
TRIGL SERPL-MCNC: 71 MG/DL
TSH SERPL DL<=0.005 MIU/L-ACNC: 0.88 UIU/ML (ref 0.3–4.2)
VIT D+METAB SERPL-MCNC: 32 NG/ML (ref 20–50)
WBC # BLD AUTO: 5.2 10E3/UL (ref 4–11)

## 2025-01-08 PROCEDURE — 99395 PREV VISIT EST AGE 18-39: CPT | Performed by: FAMILY MEDICINE

## 2025-01-08 PROCEDURE — 83036 HEMOGLOBIN GLYCOSYLATED A1C: CPT | Performed by: FAMILY MEDICINE

## 2025-01-08 PROCEDURE — 36415 COLL VENOUS BLD VENIPUNCTURE: CPT | Performed by: FAMILY MEDICINE

## 2025-01-08 PROCEDURE — 80053 COMPREHEN METABOLIC PANEL: CPT | Performed by: FAMILY MEDICINE

## 2025-01-08 PROCEDURE — 99213 OFFICE O/P EST LOW 20 MIN: CPT | Mod: 25 | Performed by: FAMILY MEDICINE

## 2025-01-08 PROCEDURE — 84443 ASSAY THYROID STIM HORMONE: CPT | Performed by: FAMILY MEDICINE

## 2025-01-08 PROCEDURE — 87624 HPV HI-RISK TYP POOLED RSLT: CPT | Performed by: FAMILY MEDICINE

## 2025-01-08 PROCEDURE — 80061 LIPID PANEL: CPT | Performed by: FAMILY MEDICINE

## 2025-01-08 PROCEDURE — 83735 ASSAY OF MAGNESIUM: CPT | Performed by: FAMILY MEDICINE

## 2025-01-08 PROCEDURE — 83690 ASSAY OF LIPASE: CPT | Performed by: FAMILY MEDICINE

## 2025-01-08 PROCEDURE — 82306 VITAMIN D 25 HYDROXY: CPT | Performed by: FAMILY MEDICINE

## 2025-01-08 PROCEDURE — 85027 COMPLETE CBC AUTOMATED: CPT | Performed by: FAMILY MEDICINE

## 2025-01-08 RX ORDER — PHENTERMINE HYDROCHLORIDE 37.5 MG/1
37.5 TABLET ORAL
Qty: 30 TABLET | Refills: 3 | Status: SHIPPED | OUTPATIENT
Start: 2025-01-08

## 2025-01-08 NOTE — PROGRESS NOTES
Preventive Care Visit  [facility]  Harleen Rivera MD, Family Medicine  Jan 8, 2025      Assessment & Plan       ICD-10-CM    1. Encounter for preventative adult health care examination  Z00.00       2. Hyperlipidemia LDL goal <130  E78.5 Lipid panel reflex to direct LDL Non-fasting     Lipid panel reflex to direct LDL Non-fasting      3. Cervical cancer screening  Z12.4       4. LUQ abdominal pain  R10.12 Lipase     US Abdomen Complete     Lipase      5. Cervical high risk HPV (human papillomavirus) test positive  R87.810 HPV and Gynecologic Cytology Panel - Recommended Age 30 - 65 Years      6. Dysplastic nevi  D23.9       7. High risk medication use  Z79.899 phentermine (ADIPEX-P) 37.5 MG tablet     Comprehensive metabolic panel (BMP + Alb, Alk Phos, ALT, AST, Total. Bili, TP)     CBC with platelets     TSH with free T4 reflex     Vitamin D Deficiency     Magnesium     Hemoglobin A1c     Comprehensive metabolic panel (BMP + Alb, Alk Phos, ALT, AST, Total. Bili, TP)     CBC with platelets     TSH with free T4 reflex     Vitamin D Deficiency     Magnesium     Hemoglobin A1c        We doing another Pap and HPV today.  If it still shows HPV I will refer you to [facility] for colposcopy since we have already done 2 colposcopies here I would like another opinion.    We will increase phentermine to 37.5 mg.    We will help set a virtual visit in 3 months for med check.    I am not saying you have to get to a BMI of 25 but that would be a weight of 151.  BMI of 26.1 I believe is a weight of 161.    I am ordering a abdominal ultrasound because of the left upper abdominal pain.  Radiology should call you but if they do not call [phone] to set that up.    I am glad you see dermatology for full body skin checks.    Next mammogram is due at 41, sooner if you or your doctor  have any concerns.    I overlooked the controlled substance agreement and urine tox today.  Left patient a  message and she can do both of those when she comes in for a well-child check with one of her children in the near future.    Patient has been advised of split billing requirements and indicates understanding: Yes        Counseling  Appropriate preventive services were addressed with this patient via screening, questionnaire, or discussion as appropriate for fall prevention, nutrition, physical activity, Tobacco-use cessation, social engagement, weight loss and cognition.  Checklist reviewing preventive services available has been given to the patient.  Reviewed patient's diet, addressing concerns and/or questions.   She is at risk for psychosocial distress and has been provided with information to reduce risk.           Marco Vincent is a 39 year old, presenting for the following:  Physical (Fasting for blood work today )        1/8/2025     8:34 AM   Additional Questions   Roomed by Tameka HINDS CMA          HPI            HPV on Pap: She has had HPV on her Pap starting in about 2018.  She had a normal colposcopy in 2020 and 2022.  She is wondering what the next steps are if she still has HPV on her Pap today.    High risk med use: Been on phentermine.  Has lost 33 lb, no weight loss in a while per paiotent.  Has not lost weight since September but down 5 pounds from June.  Would like to go to the next dose.   Some dry mouth.  Sleep a little harder with dose change buy then improves.  No chest pain or shortness of breath.  He is exercising regularly and eating healthy.    LUQ pain:  Since having children he has had some left upper quadrant abdominal pain.    Social:  Spousse in hospital with pancratitis.      Health Maintenance   Topic Date Due    HEPATITIS B IMMUNIZATION (1 of 3 - 19+ 3-dose series) Done    ADVANCE CARE PLANNING  Declined    YEARLY PREVENTIVE VISIT  Today    LIPID  Today    PAP FOLLOW-UP  Today    HPV FOLLOW-UP  Today    ANNUAL REVIEW OF HM ORDERS  Today    GLUCOSE  Today    DTAP/TDAP/TD  IMMUNIZATION (5 - Td or Tdap) 03/04/2031    RSV VACCINE (1 - 1-dose 75+ series) Age 60    HEPATITIS C SCREENING  Completed    HIV SCREENING  Completed    PHQ-2 (once per calendar year)  Completed    INFLUENZA VACCINE  Completed    HPV IMMUNIZATION  Completed    MENINGITIS IMMUNIZATION  Completed    COVID-19 Vaccine  Completed    Pneumococcal Vaccine: Pediatrics (0 to 5 Years) and At-Risk Patients (6 to 49 Years)  Age 50    RSV MONOCLONAL ANTIBODY  Aged Out    PAP  Today       Health Care Directive  Patient does not have a Health Care Directive: Discussed advance care planning with patient; however, patient declined at this time.      1/6/2025   General Health   How would you rate your overall physical health? Good   Feel stress (tense, anxious, or unable to sleep) Rather much   (!) STRESS CONCERN      1/6/2025   Nutrition   Three or more servings of calcium each day? (!) I DON'T KNOW   Diet: Regular (no restrictions)   How many servings of fruit and vegetables per day? (!) 2-3   How many sweetened beverages each day? 0-1         1/6/2025   Exercise   Days per week of moderate/strenous exercise 5 days   Average minutes spent exercising at this level 30 min         1/6/2025   Social Factors   Frequency of gathering with friends or relatives Twice a week   Worry food won't last until get money to buy more No   Food not last or not have enough money for food? No   Do you have housing? (Housing is defined as stable permanent housing and does not include staying ouside in a car, in a tent, in an abandoned building, in an overnight shelter, or couch-surfing.) Yes   Are you worried about losing your housing? No   Lack of transportation? No   Unable to get utilities (heat,electricity)? No         1/6/2025   Dental   Dentist two times every year? Yes         1/6/2025   TB Screening   Were you born outside of the [place]? No         Today's PHQ-2 Score:       1/8/2025     8:30 AM   PHQ-2 ( 1999 [employer])   Q1: Little interest or  pleasure in doing things 0   Q2: Feeling down, depressed or hopeless 0   PHQ-2 Score 0    Q1: Little interest or pleasure in doing things Not at all   Q2: Feeling down, depressed or hopeless Not at all   PHQ-2 Score 0       Patient-reported           1/6/2025   Substance Use   Alcohol more than 3/day or more than 7/wk No   Do you use any other substances recreationally? No     Social History     Tobacco Use    Smoking status: Never     Passive exposure: Never    Smokeless tobacco: Never   Vaping Use    Vaping status: Never Used   Substance Use Topics    Alcohol use: Yes     Alcohol/week: 2.5 standard drinks of alcohol     Comment: Alcoholic Drinks/day: prior to pregnancy    Drug use: No             1/6/2025   Breast Cancer Screening   Family history of breast, colon, or ovarian cancer? No / Unknown         11/3/2023   LAST FHS-7 RESULTS   1st degree relative breast or ovarian cancer No   Any relative bilateral breast cancer No   Any male have breast cancer No   Any ONE woman have BOTH breast AND ovarian cancer No   Any woman with breast cancer before 50yrs No   2 or more relatives with breast AND/OR ovarian cancer No   2 or more relatives with breast AND/OR bowel cancer No        Mammogram Screening - Patient under 40 years of age: Routine Mammogram Screening not recommended.         1/6/2025   STI Screening   New sexual partner(s) since last STI/HIV test? No     History of abnormal Pap smear: see below        Latest Ref Rng & Units 12/6/2023     1:11 PM 7/6/2022     9:05 AM 7/7/2021     3:21 PM   PAP / HPV   PAP  Negative for Intraepithelial Lesion or Malignancy (NILM)  Negative for Intraepithelial Lesion or Malignancy (NILM)  Negative for squamous intraepithelial lesion or malignancy  Electronically signed by Bimal Rome CT (ASCP) on 7/14/2021 at  1:57 PM      HPV 16 DNA Negative Negative  Negative  Negative    HPV 18 DNA Negative Negative  Negative  Negative    Other HR HPV Negative Positive  Positive   "Positive            1/6/2025   Contraception/Family Planning   Questions about contraception or family planning No        Reviewed and updated as needed this visit by Provider                             Objective    Exam  /78 (BP Location: Left arm, Patient Position: Sitting, Cuff Size: Adult Large)   Pulse 85   Temp 97.7  F (36.5  C) (Oral)   Resp 16   Ht 1.651 m (5' 5\")   Wt 77.7 kg (171 lb 3.2 oz)   LMP 12/30/2024 (Exact Date)   SpO2 100%   BMI 28.49 kg/m     Estimated body mass index is 28.49 kg/m  as calculated from the following:    Height as of this encounter: 1.651 m (5' 5\").    Weight as of this encounter: 77.7 kg (171 lb 3.2 oz).    Physical Exam  GENERAL: alert and no distress  EYES: Eyes grossly normal to inspection, PERRL and conjunctivae and sclerae normal  HENT: ear canals and TM's normal, nose and mouth without ulcers or lesions  NECK: no adenopathy, no asymmetry, masses, or scars  RESP: lungs clear to auscultation - no rales, rhonchi or wheezes  CV: regular rate and rhythm, normal S1 S2, no S3 or S4, no murmur, click or rub, no peripheral edema  ABDOMEN: soft, nontender, no hepatosplenomegaly, no masses and bowel sounds normal   (female) w/bimanual: normal female external genitalia, normal urethral meatus, normal vaginal mucosa, and normal cervix/adnexa/uterus without masses or discharge  MS: no gross musculoskeletal defects noted, no edema  SKIN: no suspicious lesions or rashes  NEURO: Normal strength and tone, mentation intact and speech normal  PSYCH: mentation appears normal, affect normal/bright        Signed Electronically by: Harleen Rivera MD    "

## 2025-01-08 NOTE — LETTER
[facility]  01/08/25  Patient: Melisa Daily  YOB: 1985  Medical Record Number: [id number]                                                                                  Non-Opioid Controlled Substance Agreement    This is an agreement between you and your provider regarding safe and appropriate use of controlled substances prescribed by your care team. Controlled substances are?medicines that can cause physical and mental dependence (abuse).     There are strict laws about having and using these medicines. We here at [facility] are  committed to working with you in your efforts to get better. To support you in this work, we'll help you schedule regular office appointments for medicine refills. If we must cancel or change your appointment for any reason, we'll make sure you have enough medicine to last until your next appointment.     As a Provider, I will:   Listen carefully to your concerns while treating you with respect.   Recommend a treatment plan that I believe is in your best interest and may involve therapies other than medicine.    Talk with you often about the possible benefits and the risk of harm of any medicine that we prescribe for you.  Assess the safety of this medicine and check how well it works.    Provide a plan on how to taper (discontinue or go off) using this medicine if the decision is made to stop its use.      ::  As a Patient, I understand controlled substances:     Are prescribed by my care provider to help me function or work and manage my condition(s).?  Are strong medicines and can cause serious side effects.     Need to be taken exactly as prescribed.?Combining controlled substances with certain medicines or chemicals (such as illegal drugs, alcohol, sedatives, sleeping pills, and benzodiazepines) can be dangerous or even fatal.? If I stop taking my medicines suddenly, I may have severe withdrawal symptoms.     The risks, benefits,  and side effects of these medicine(s) were explained to me. I agree that:    I will take part in other treatments as advised by my care team. This may be psychiatry or counseling, physical therapy, behavioral therapy, group treatment or a referral to specialist.    I will keep all my appointments and understand this is part of the monitoring of controlled substances.?My care team may require an office visit for EVERY controlled substance refill. If I miss appointments or don t follow instructions, my care team may stop my medicine    I will take my medicines as prescribed. I will not change the dose or schedule unless my care team tells me to. There will be no refills if I run out early.      I may be asked to come to the clinic and complete a urine drug test or complete a pill count. If I don t give a urine sample or participate in a pill count, the care team may stop my medicine.    I will only receive controlled substance prescriptions from this clinic. If I am treated by another provider, I will tell them that I am taking controlled substances and that I have a treatment agreement with this provider. I will inform my [facility] care team within one business day if I am given a prescription for any controlled substance by another healthcare provider. My [facility] care team can contact other providers and pharmacists about my use of any medicines.    It is up to me to make sure that I don't run out of my medicines on weekends or holidays.?If my care team is willing to refill my prescription without a visit, I must request refills only during office hours. Refills may take up to 3 business days to process. I will use one pharmacy to fill all my controlled substance prescriptions. I will notify the clinic about any changes to my insurance or medicine availability.    I am responsible for my prescriptions. If the medicine/prescription is lost, stolen or destroyed, it will not be replaced.?I also agree  not to share controlled substance medicines with anyone.     I am aware I should not use any illegal or recreational drugs. I agree not to drink alcohol unless my care team says I can.     If I enroll in the [place] [facility] program, I will tell my care team before my next refill.    I will tell my care team right away if I become pregnant, have a new medical problem treated outside of my regular clinic, or have a change in my medicines.     I understand that this medicine can affect my thinking, judgment and reaction time.? Alcohol and drugs affect the brain and body, which can affect the safety of my driving. Being under the influence of alcohol or drugs can affect my decision-making, behaviors, personal safety and the safety of others. Driving while impaired (DWI) can occur if a person is driving, operating or in physical control of a car, motorcycle, boat, snowmobile, ATV, motorbike, off-road vehicle or any other motor vehicle ([place] Statute 169A.20). I understand the risk if I choose to drive or operate any vehicle or machinery.    I understand that if I do not follow any of the conditions above, my prescriptions or treatment may be stopped or changed.   I agree that my provider, clinic care team and pharmacy may work with any city, state or federal law enforcement agency that investigates the misuse, sale or other diversion of my controlled medicine. I will allow my provider to discuss my care with, or share a copy of, this agreement with any other treating provider, pharmacy or emergency room where I receive care.     I have read this agreement and have asked questions about anything I did not understand.    ________________________________________________________  Patient Signature - Melisa Daily     ___________________                   Date     ________________________________________________________  Provider Signature - Harleen Rivera MD       ___________________                   Date      ________________________________________________________  Witness Signature (required if provider not present while patient signing)          ___________________                   Date

## 2025-01-09 LAB
HPV HR 12 DNA CVX QL NAA+PROBE: POSITIVE
HPV16 DNA CVX QL NAA+PROBE: NEGATIVE
HPV18 DNA CVX QL NAA+PROBE: NEGATIVE
HUMAN PAPILLOMA VIRUS FINAL DIAGNOSIS: ABNORMAL

## 2025-01-13 ENCOUNTER — TELEPHONE (OUTPATIENT)
Dept: FAMILY MEDICINE | Facility: CLINIC | Age: 40
End: 2025-01-13
Payer: COMMERCIAL

## 2025-01-13 DIAGNOSIS — R87.810 CERVICAL HIGH RISK HPV (HUMAN PAPILLOMAVIRUS) TEST POSITIVE: Primary | ICD-10-CM

## 2025-01-13 NOTE — TELEPHONE ENCOUNTER
Pt called [facility] to make a consult appt but needs a referral. Please place referral and please call the pt to inform her once this has been placed.

## 2025-01-14 ENCOUNTER — HOSPITAL ENCOUNTER (OUTPATIENT)
Dept: ULTRASOUND IMAGING | Facility: HOSPITAL | Age: 40
Discharge: HOME | End: 2025-01-14
Attending: FAMILY MEDICINE
Payer: COMMERCIAL

## 2025-01-14 DIAGNOSIS — R10.12 LUQ ABDOMINAL PAIN: ICD-10-CM

## 2025-01-14 DIAGNOSIS — R93.5 ABNORMAL ULTRASOUND OF ABDOMEN: Primary | ICD-10-CM

## 2025-01-14 DIAGNOSIS — Z12.4 CERVICAL CANCER SCREENING: ICD-10-CM

## 2025-01-14 PROCEDURE — 76700 US EXAM ABDOM COMPLETE: CPT

## 2025-01-17 ENCOUNTER — MYC MEDICAL ADVICE (OUTPATIENT)
Dept: FAMILY MEDICINE | Facility: CLINIC | Age: 40
End: 2025-01-17
Payer: COMMERCIAL

## 2025-01-17 DIAGNOSIS — R93.5 ABNORMAL ULTRASOUND OF ABDOMEN: Primary | ICD-10-CM

## 2025-02-04 ENCOUNTER — LAB REQUISITION (OUTPATIENT)
Dept: LAB | Facility: CLINIC | Age: 40
End: 2025-02-04
Payer: COMMERCIAL

## 2025-02-04 DIAGNOSIS — R87.619 UNSPECIFIED ABNORMAL CYTOLOGICAL FINDINGS IN SPECIMENS FROM CERVIX UTERI: ICD-10-CM

## 2025-02-04 PROCEDURE — 88305 TISSUE EXAM BY PATHOLOGIST: CPT | Mod: TC,ORL | Performed by: OBSTETRICS & GYNECOLOGY

## 2025-02-04 PROCEDURE — 88305 TISSUE EXAM BY PATHOLOGIST: CPT | Mod: 26 | Performed by: PATHOLOGY

## 2025-02-06 LAB
PATH REPORT.COMMENTS IMP SPEC: NORMAL
PATH REPORT.COMMENTS IMP SPEC: NORMAL
PATH REPORT.FINAL DX SPEC: NORMAL
PATH REPORT.GROSS SPEC: NORMAL
PATH REPORT.MICROSCOPIC SPEC OTHER STN: NORMAL
PATH REPORT.RELEVANT HX SPEC: NORMAL
PHOTO IMAGE: NORMAL

## 2025-03-05 ENCOUNTER — NURSE TRIAGE (OUTPATIENT)
Dept: NURSING | Facility: CLINIC | Age: 40
End: 2025-03-05

## 2025-03-05 ENCOUNTER — VIRTUAL VISIT (OUTPATIENT)
Dept: FAMILY MEDICINE | Facility: CLINIC | Age: 40
End: 2025-03-05
Payer: COMMERCIAL

## 2025-03-05 ENCOUNTER — LAB (OUTPATIENT)
Dept: LAB | Facility: CLINIC | Age: 40
End: 2025-03-05
Attending: FAMILY MEDICINE
Payer: COMMERCIAL

## 2025-03-05 DIAGNOSIS — J02.9 SORE THROAT: ICD-10-CM

## 2025-03-05 DIAGNOSIS — Z79.899 CONTROLLED SUBSTANCE AGREEMENT SIGNED: ICD-10-CM

## 2025-03-05 DIAGNOSIS — R05.9 COUGH, UNSPECIFIED TYPE: Primary | ICD-10-CM

## 2025-03-05 DIAGNOSIS — R05.9 COUGH, UNSPECIFIED TYPE: ICD-10-CM

## 2025-03-05 LAB
AMPHETAMINES UR QL SCN: NORMAL
BARBITURATES UR QL SCN: NORMAL
BENZODIAZ UR QL SCN: NORMAL
BZE UR QL SCN: NORMAL
CANNABINOIDS UR QL SCN: NORMAL
DEPRECATED S PYO AG THROAT QL EIA: NEGATIVE
FENTANYL UR QL: NORMAL
FLUAV RNA SPEC QL NAA+PROBE: NEGATIVE
FLUBV RNA RESP QL NAA+PROBE: POSITIVE
OPIATES UR QL SCN: NORMAL
PCP QUAL URINE (ROCHE): NORMAL
RSV RNA SPEC NAA+PROBE: NEGATIVE
S PYO DNA THROAT QL NAA+PROBE: NOT DETECTED
SARS-COV-2 RNA RESP QL NAA+PROBE: NEGATIVE

## 2025-03-05 PROCEDURE — 87637 SARSCOV2&INF A&B&RSV AMP PRB: CPT

## 2025-03-05 PROCEDURE — 80307 DRUG TEST PRSMV CHEM ANLYZR: CPT

## 2025-03-05 NOTE — PROGRESS NOTES
"Melisa is a 39 year old who is being evaluated via a billable video visit.    How would you like to obtain your AVS? MyChart  If the video visit is dropped, the invitation should be resent by: Text to cell phone: [phone]  Will anyone else be joining your video visit? No      Assessment & Plan     Cough, unspecified type  Patient's combination of symptoms including laryngitis body aches chills headache congestion and slight cough with no fevers over the last 1 to 2 days.  Discussed multiple possible etiologies and through a video visit difficult to further evaluate I asked her to come in for a lab visit which she is willing to do to at least swab for viral etiology  Discussed with her that there are a lot of other viruses besides influenza COVID and RSV going around that are causing similar symptoms and suspect viral pathology over bacterial with symptoms only being present for a little more than a day and with laryngitis  She is concerned about leaving for spring break out of the country on Sunday and hoping to be further evaluated for the possible need of medication if this is a worsening sinus infection which she has had in the past  Discussed with her obtaining swab and will follow-up based on results and further make a plan based on her symptoms over the next 2 days  - Influenza A/B, RSV and SARS-CoV2 PCR (COVID-19)            BMI  Estimated body mass index is 28.49 kg/m  as calculated from the following:    Height as of 1/8/25: 1.651 m (5' 5\").    Weight as of 1/8/25: 77.7 kg (171 lb 3.2 oz).             Subjective   Melisa is a 39 year old, presenting for the following health issues:  URI (Losing voice, body aches/ chills, headache, face pressure and pain, congestion, slight cough x 2 days)    URI          Patient seen via video visit for concerns over congestion slight cough chills body aches headache and congestion along with a hoarse voice that started yesterday.  Patient is concerned with a history of " recurrent sinus infections in the past and leaving out of the country in the next weeks time for spring break.  Discussed with her lab visit to obtain swab to rule out viral etiology but also discussed there is a lot of other viruses that are causing similar symptoms and with laryngitis suspect more viral etiology than bacterial.  We discussed with her with duration of symptoms we can reevaluate in a couple days if symptoms are worsening for consideration if the viral swab is negative for the need of antibiotics for possible worsening sinus symptoms.  Discussed possible side effects with antibiotics for treating viral etiology.            Objective           Vitals:  No vitals were obtained today due to virtual visit.    Physical Exam   GENERAL: alert, no distress, and hoarse voice  EYES: Eyes grossly normal to inspection.  No discharge or erythema, or obvious scleral/conjunctival abnormalities.  RESP: No audible wheeze, cough, or visible cyanosis.    SKIN: Visible skin clear. No significant rash, abnormal pigmentation or lesions.  NEURO: Cranial nerves grossly intact.  Mentation and speech appropriate for age.  PSYCH: Appropriate affect, tone, and pace of words          Video-Visit Details    Type of service:  Video Visit   Originating Location (pt. Location): Home    Distant Location (provider location):  On-site  Platform used for Video Visit: Ronaldo  Signed Electronically by: Heber Poe MD

## 2025-03-06 DIAGNOSIS — J10.1 INFLUENZA B: Primary | ICD-10-CM

## 2025-03-06 RX ORDER — OSELTAMIVIR PHOSPHATE 75 MG/1
75 CAPSULE ORAL 2 TIMES DAILY
Qty: 10 CAPSULE | Refills: 0 | Status: SHIPPED | OUTPATIENT
Start: 2025-03-06 | End: 2025-03-11

## 2025-03-06 NOTE — TELEPHONE ENCOUNTER
Patient calling. She had a video visit earlier, and she just got the lab results. She tested positive for influenza b, and is wondering about a prescription for Tamiflu. Symptoms started yesterday morning when she woke up.       Influenza-Like Illness (JOEL) RN Standing Order (13+)    Melisa AUTUMN Daily      Age: 39 year old     YOB: 1985    Patient has been triaged using Epic triage guidelines: Patient does not need higher level of care     Has the patient been seen at a [facility] or [email or web address] Clinic (established in primary or specialty care) in the last two years? Yes     Does the patient have symptoms or been exposed to a confirmed case of influenza?  Symptoms- patient has JOEL symptoms that started <48 hours ago and has had close contact with a confirmed case of influenza within 5 days.     Since this patient has symptoms, does the patient have ANY of the following?  Younger than 5 years of age or age 65 and older No   Chronic pulmonary disease such as asthma or COPD No   Heart disease (CHF, CAD, anticoagulation d/t arrhytmia, congenital heart anomaly) *HTN alone is excluded No   Kidney disease insufficiency No   Hepatic or Hematologic disorder (e.g. chronic liver disease patient, sickle cell disease) No   Diabetes (Type 1 or Type 2) No   Neurologic and Neurodevelopment Conditions (including disorders of the brain, spinal cord, peripheral nerve, and muscle, such as cerebral palsy, epilepsy (seizure disorders), stroke, intellectual disability, moderate to severe developmental delay, muscular dystrophy, or spinal cord injury) No   Obese with BMI >40 No   Immunosuppression caused by medications such as those taking prednisone in excess of 20mg daily, or caused by HIV/AIDS with CD4 count more than 200 No   Is pregnant, may be pregnant, or is within two weeks after delivery No   Is a resident of a chronic care facility No   Is the patient considered a non- Black,  or , or American   or  racial or ethnic minority group? No   Is <19 years old and is receiving long term aspirin- or salicylate-containing medications No     Does this patient have ANY of the above conditions? No. Since the patient does not have any high risk conditions they do not qualify for antiviral treatment. Recommend a call back if symptoms worsen.     Additional educational resources include:  http://www.[employer]  http://www.cdc.gov/flu/  Routed to Dr. Poe due to patient having been told that if her test is positive, that he would prescribe Tamiflu for her. Patient's pharmacy of preference is [facility] in [place] on [address].     Thea Tran RN   March 5, 2025 6:51 PM    Reason for Disposition   Earache   Earache    Additional Information   Negative: SEVERE difficulty breathing (e.g., struggling for each breath, speaks in single words)   Negative: Difficult to awaken or acting confused (e.g., disoriented, slurred speech)   Negative: Bluish (or gray) lips or face now   Negative: Shock suspected (e.g., cold/pale/clammy skin, too weak to stand, low BP, rapid pulse)   Negative: Sounds like a life-threatening emergency to the triager   Negative: [1] Symptoms of COVID-19 (e.g., cough, fever, SOB, or others) AND [2] lives in an area with community spread   Negative: [1] Sounds like a cold AND [2] no fever   Negative: [1] Cough with sputum AND [2] no fever   Negative: [1] Dry cough (no sputum) AND [2] no fever   Negative: [1] Throat pain AND [2] no fever   Negative: Influenza vaccine reaction is suspected   Negative: Chest pain  (Exception: MILD central chest pain, present only when coughing.)   Negative: [1] Headache AND [2] stiff neck (can't touch chin to chest)   Negative: Fever > 104 F (40 C)   Negative: [1] Difficulty breathing AND [2] not severe AND [3] not from stuffy nose (e.g., not relieved by cleaning out the nose)   Negative: Patient sounds very sick or weak to the triager    Negative: [1] Fever > 101 F (38.3 C) AND [2] age > 60 years   Negative: [1] Fever > 100.0 F (37.8 C) AND [2] bedridden (e.g., CVA, chronic illness, recovering from surgery)   Negative: [1] Fever > 100.0 F (37.8 C) AND [2] diabetes mellitus or weak immune system (e.g., HIV positive, cancer chemo, splenectomy, organ transplant, chronic steroids)   Negative: Fever present > 3 days (72 hours)   Negative: [1] Fever returns after gone for over 24 hours AND [2] symptoms worse or not improved   Negative: [1] Using nasal washes and pain medicine > 24 hours AND [2] sinus pain (around cheekbone or eye) persists   Negative: SEVERE difficulty breathing (e.g., struggling for each breath, speaks in single words)   Negative: Bluish (or gray) lips or face now   Negative: Shock suspected (e.g., cold/pale/clammy skin, too weak to stand, low BP, rapid pulse)   Negative: Sounds like a life-threatening emergency to the triager   Negative: [1] Asthma attack (coughing, wheezing) is main concern AND [2] previously diagnosed with asthma OR using asthma medicines   Negative: [1] Sinus infection AND [2] taking an antibiotic   Negative: Taking antibiotics for an ear infection   Negative: Chest pain  (Exception: MILD central chest pain, present only when coughing.)   Negative: Headache and stiff neck (can't touch chin to chest)   Negative: [1] Difficulty breathing AND [2] not severe AND [3] not from stuffy nose (e.g., not relieved by cleaning out the nose)   Negative: Fever > 104 F (40 C)   Negative: Patient sounds very sick or weak to the triager   Negative: Fever present > 3 days (72 hours)   Negative: [1] Fever returns after gone for over 24 hours AND [2] symptoms worse or not improved   Negative: [1] Using nasal washes and pain medicine > 24 hours AND [2] sinus pain (around cheekbone or eye) persists    Protocols used: Influenza (Flu) - Seasonal-[place], Influenza (Flu) Follow-up Call-[place]

## 2025-03-06 NOTE — TELEPHONE ENCOUNTER
Called patient and left message to call back to discuss lab results below. Please relay result note to patient and assist with any recommended follow up scheduling. If there are further questions or concerns please transfer to nurse line or take a message for the provider.    Thank you,    Jose M Beckman RN     [facility]

## 2025-03-06 NOTE — TELEPHONE ENCOUNTER
Spoke with patient and relayed information below. Patient verbalized understanding and has no further questions.

## 2025-04-09 ENCOUNTER — VIRTUAL VISIT (OUTPATIENT)
Dept: FAMILY MEDICINE | Facility: CLINIC | Age: 40
End: 2025-04-09
Payer: COMMERCIAL

## 2025-04-09 DIAGNOSIS — R74.8 ELEVATED LIPASE: Primary | ICD-10-CM

## 2025-04-09 DIAGNOSIS — Z79.899 HIGH RISK MEDICATION USE: ICD-10-CM

## 2025-04-09 DIAGNOSIS — R93.5 ABNORMAL ABDOMINAL ULTRASOUND: ICD-10-CM

## 2025-04-09 PROCEDURE — 98005 SYNCH AUDIO-VIDEO EST LOW 20: CPT | Performed by: FAMILY MEDICINE

## 2025-04-09 RX ORDER — PHENTERMINE HYDROCHLORIDE 37.5 MG/1
37.5 TABLET ORAL
Qty: 30 TABLET | Refills: 3 | Status: SHIPPED | OUTPATIENT
Start: 2025-04-09

## 2025-04-09 NOTE — PATIENT INSTRUCTIONS
Continue phentermine 37.5 mg daily.    Recommend tracking calories and carbs.  Recommend about 1600 to 1700 amrino without exercise and 1500 with exercise.    Goal to increase exercise to 3 to 4 days a week.    Up in 3 months.    Set a lab appointment to recheck lipase.    Will recheck an abdominal kidney ultrasound mid July.

## 2025-04-09 NOTE — PROGRESS NOTES
"Melisa is a 39 year old who is being evaluated via a billable video visit.    How would you like to obtain your AVS? MyChart  If the video visit is dropped, the invitation should be resent by: Text to cell phone: [phone]  Will anyone else be joining your video visit? No      Assessment & Plan       ICD-10-CM    1. Elevated lipase  R74.8 Lipase      2. Abnormal abdominal ultrasound  R93.5 US Abdomen Complete      3. High risk medication use  Z79.899 phentermine (ADIPEX-P) 37.5 MG tablet        Continue phentermine 37.5 mg daily.    Recommend tracking calories and carbs.  Recommend about 1600 to 1700 marino without exercise and 1500 with exercise.    Goal to increase exercise to 3 to 4 days a week.    Follow-up set in 3 months.    Set a lab appointment to recheck lipase.    Will recheck an abdominal kidney ultrasound mid July.    20 minutes spent with this patient including chart review, patient visit and documentation on the date of service.    The longitudinal plan of care for the diagnosis(es)/condition(s) as documented were addressed during this visit. Due to the added complexity in care, I will continue to support Melisa in the subsequent management and with ongoing continuity of care.  Helping to manage her high risk med use.      BMI  Estimated body mass index is 28.49 kg/m  as calculated from the following:    Height as of 1/8/25: 1.651 m (5' 5\").    Weight as of 1/8/25: 77.7 kg (171 lb 3.2 oz).   Weight management plan: Discussed healthy diet and exercise guidelines          Subjective   Melisa is a 39 year old, presenting for the following health issues:  Recheck Medication (Patient is following up on phentermine. Patient is also needing orders placed for follow up labs previously discussed. )    High risk Med use: She is on phentermine.  Has been on this for about a year and 10 months.  No side effects of chest pain, palpitations, shortness of breath, insomnia etc.  She is down 5 pounds in the last 3 months " "and 35 pounds overall.  She is currently exercising 2 to 3 days a week.  She is not tracking calories or carbs at this point.      Evaded lipase: At one of the last visits she was complaining of some abdominal pain and lipase and ultrasound was done.  Lipase was mildly elevated.      Normal abdominal and kidney ultrasound: After her last visit and complaining of some abdominal pain abdominal ultrasound was done.  It did show the below abnormalities:    IMPRESSION:  1.  Mild left-sided hydronephrosis.     2.  Changes of adenomyomatosis in the gallbladder. Slight prominence of the common bile duct measuring 5.5 mm. If there is lab correlation, MRCP could be performed.    Insurance would not cover the MRCP but she is agreeable to follow with ultrasound.  She denies any more abdominal pain and no flank pain.            4/9/2025    10:54 AM   Additional Questions   Roomed by Tameka HINDS CMA     History of Present Illness       Reason for visit:  Medication check for continuing phentermine    She eats 2-3 servings of fruits and vegetables daily.She consumes 0 sweetened beverage(s) daily.She exercises with enough effort to increase her heart rate 10 to 19 minutes per day.  She exercises with enough effort to increase her heart rate 5 days per week.   She is taking medications regularly.                    Objective    Vitals - Patient Reported  Weight (Patient Reported): 76.7 kg (169 lb)  Height (Patient Reported): 165.1 cm (5' 5\")  BMI (Based on Pt Reported Ht/Wt): 28.12        Physical Exam   GENERAL: alert and no distress          Video-Visit Details    Type of service:  Video Visit   Originating Location (pt. Location): Home    Distant Location (provider location):  On-site  Platform used for Video Visit: Willem  Signed Electronically by: Harleen Rivera MD    "

## 2025-04-23 ENCOUNTER — LAB (OUTPATIENT)
Dept: LAB | Facility: CLINIC | Age: 40
End: 2025-04-23
Payer: COMMERCIAL

## 2025-04-23 DIAGNOSIS — R74.8 ELEVATED LIPASE: ICD-10-CM

## 2025-04-23 LAB — LIPASE SERPL-CCNC: 67 U/L (ref 13–60)

## 2025-04-23 PROCEDURE — 36415 COLL VENOUS BLD VENIPUNCTURE: CPT

## 2025-04-23 PROCEDURE — 83690 ASSAY OF LIPASE: CPT

## 2025-06-16 ENCOUNTER — E-VISIT (OUTPATIENT)
Dept: URGENT CARE | Facility: CLINIC | Age: 40
End: 2025-06-16
Payer: COMMERCIAL

## 2025-06-16 DIAGNOSIS — J01.90 ACUTE NON-RECURRENT SINUSITIS, UNSPECIFIED LOCATION: Primary | ICD-10-CM

## 2025-06-16 NOTE — PATIENT INSTRUCTIONS
Acute Sinusitis: Care Instructions  Overview     Acute sinusitis is an inflammation of the mucous membranes inside the nose and sinuses. Sinuses are the hollow spaces in your skull around the eyes and nose. Acute sinusitis often follows a cold. Acute sinusitis causes thick, discolored mucus that drains from the nose or down the back of the throat. It also can cause pain and pressure in your head and face along with a stuffy or blocked nose.  In most cases, sinusitis gets better on its own in 1 to 2 weeks. But some mild symptoms may last for several weeks. Sometimes antibiotics are needed if there is a bacterial infection.  Follow-up care is a key part of your treatment and safety. Be sure to make and go to all appointments, and call your doctor if you are having problems. It's also a good idea to know your test results and keep a list of the medicines you take.  How can you care for yourself at home?  Use saline (saltwater) nasal washes. This can help keep your nasal passages open and wash out mucus and allergens.  You can buy saline nose washes at a grocery store or drugstore. Follow the instructions on the package.  You can make your own at home. Add 1 teaspoon of non-iodized salt and 1 teaspoon of baking soda to 2 cups of distilled or boiled and cooled water. Fill a squeeze bottle or a nasal cleansing pot (such as a neti pot) with the nasal wash. Then put the tip into your nostril, and lean over the sink. With your mouth open, gently squirt the liquid. Repeat on the other side.  Try a decongestant nasal spray like oxymetazoline (Afrin). Do not use it for more than 3 days in a row. Using it for more than 3 days can make your congestion worse.  If needed, take an over-the-counter pain medicine, such as acetaminophen (Tylenol), ibuprofen (Advil, Motrin), or naproxen (Aleve). Read and follow all instructions on the label.  If the doctor prescribed antibiotics, take them as directed. Do not stop taking them just  "because you feel better. You need to take the full course of antibiotics.  Be careful when taking over-the-counter cold or flu medicines and Tylenol at the same time. Many of these medicines have acetaminophen, which is Tylenol. Read the labels to make sure that you are not taking more than the recommended dose. Too much acetaminophen (Tylenol) can be harmful.  Try a steroid nasal spray. It may help with your symptoms.  Breathe warm, moist air. You can use a steamy shower, a hot bath, or a sink filled with hot water. Avoid cold, dry air. Using a humidifier in your home may help. Follow the directions for cleaning the machine.  When should you call for help?   Call your doctor now or seek immediate medical care if:    You have new or worse swelling, redness, or pain in your face or around one or both of your eyes.     You have double vision or a change in your vision.     You have a high fever.     You have a severe headache and a stiff neck.     You have mental changes, such as feeling confused or much less alert.   Watch closely for changes in your health, and be sure to contact your doctor if:    You are not getting better as expected.   Where can you learn more?  Go to https://www.[employer].net/patiented  Enter I933 in the search box to learn more about \"Acute Sinusitis: Care Instructions.\"  Current as of: October 27, 2024  Content Version: 14.5    0199-8807 [email or web address].   Care instructions adapted under license by your healthcare professional. If you have questions about a medical condition or this instruction, always ask your healthcare professional. [email or web address] disclaims any warranty or liability for your use of this information.    Dear Melisa Daily    After reviewing your responses, I've been able to diagnose you with sinusitis.      Based on your responses and diagnosis, I have prescribed Augmentin to treat your symptoms. I have sent this to your pharmacy.?     It is also important " to stay well hydrated, get lots of rest and take over-the-counter decongestants,?tylenol?or ibuprofen if you?are able to?take those medications per your primary care provider to help relieve discomfort.?     It is important that you take?all of?your prescribed medication even if your symptoms are improving after a few doses.? Taking?all of?your medicine helps prevent the symptoms from returning.?     If your symptoms worsen, you develop severe headache, vomiting, high fever (>102), or are not improving in 7 days, please contact your primary care provider for an appointment or visit any of our convenient Walk-in Care or Urgent Care Centers to be seen which can be found on our website?here.?     Thanks again for choosing?us?as your health care partner,?   ?  Candi Correa, ANT?

## 2025-07-03 ENCOUNTER — VIRTUAL VISIT (OUTPATIENT)
Dept: FAMILY MEDICINE | Facility: CLINIC | Age: 40
End: 2025-07-03
Payer: COMMERCIAL

## 2025-07-03 DIAGNOSIS — Z79.899 HIGH RISK MEDICATION USE: ICD-10-CM

## 2025-07-03 DIAGNOSIS — R74.8 ELEVATED LIPASE: Primary | ICD-10-CM

## 2025-07-03 RX ORDER — PHENTERMINE HYDROCHLORIDE 37.5 MG/1
37.5 TABLET ORAL
Qty: 30 TABLET | Refills: 2 | Status: SHIPPED | OUTPATIENT
Start: 2025-07-03

## 2025-07-03 NOTE — PROGRESS NOTES
Melisa is a 40 year old who is being evaluated via a billable video visit.    How would you like to obtain your AVS? MyChart  If the video visit is dropped, the invitation should be resent by: Text to cell phone: [phone]  Will anyone else be joining your video visit? No      Assessment & Plan       ICD-10-CM    1. Elevated lipase  R74.8 Lipase      2. High risk medication use  Z79.899 phentermine (ADIPEX-P) 37.5 MG tablet        Continue phentermine 37.5 mg daily.    Continue exercise 5 to 6 days a week and recommended adding in some weights or strength training as that might help with your weight loss.    We will set up virtual visit in 3 months.    We will set up physical in 6 months.    At the physical we will renew the controlled substance agreement and urine tox.    Prescription monitoring program reviewed and patient following the controlled substance agreement.    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    The longitudinal plan of care for the diagnosis(es)/condition(s) as documented were addressed during this visit. Due to the added complexity in care, I will continue to support Melisa in the subsequent management and with ongoing continuity of care.  Helping manage her high risk medication use.                Subjective   Melisa is a 40 year old, presenting for the following health issues:  Recheck Medication (Med check for refill)        7/3/2025     4:25 PM   Additional Questions   Roomed by Katherine STRAUSS CMA   Accompanied by self     History of Present Illness       Reason for visit:  Med check/refill    She eats 2-3 servings of fruits and vegetables daily.She consumes 0 sweetened beverage(s) daily.She exercises with enough effort to increase her heart rate 20 to 29 minutes per day.  She exercises with enough effort to increase her heart rate 6 days per week.   She is taking medications regularly.                  Med check Phentermine: She  is down 35 lbs, but has not lost weight in the last couple of months. Exercises 5-6 days a week for 20 min.  Has not done training.  No side effects of chest pain, palpitations or shortness of breath.  She does feel it is helping her with appetite support.    She had a sinus infection a few weeks ago and some chest pain with that but was treated with antibiotics and that resolved and none since.      Objective           Vitals:  No vitals were obtained today due to virtual visit.    Physical Exam   GENERAL: alert and no distress        Video-Visit Details    Type of service:  Video Visit   Originating Location (pt. Location): Home    Distant Location (provider location):  On-site  [facility]  Signed Electronically by: Harleen Rivera MD

## 2025-07-03 NOTE — PATIENT INSTRUCTIONS
Continue phentermine 37.5 mg daily.    Continue exercise 5 to 6 days a week and recommended adding in some weights or strength training as that might help with your weight loss.    We will set up virtual visit in 3 months.    We will set up physical in 6 months.    At the physical we will renew the controlled substance agreement and urine tox.    Prescription monitoring program reviewed and patient following the controlled substance agreement.    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

## 2025-08-13 ENCOUNTER — OFFICE VISIT (OUTPATIENT)
Dept: FAMILY MEDICINE | Facility: CLINIC | Age: 40
End: 2025-08-13
Payer: COMMERCIAL

## 2025-08-13 VITALS
HEIGHT: 65 IN | SYSTOLIC BLOOD PRESSURE: 129 MMHG | BODY MASS INDEX: 29.12 KG/M2 | WEIGHT: 174.8 LBS | OXYGEN SATURATION: 100 % | TEMPERATURE: 97.6 F | HEART RATE: 94 BPM | RESPIRATION RATE: 16 BRPM | DIASTOLIC BLOOD PRESSURE: 76 MMHG

## 2025-08-13 DIAGNOSIS — B07.0 PLANTAR WARTS: Primary | ICD-10-CM

## 2025-08-13 PROCEDURE — 17110 DESTRUCTION B9 LES UP TO 14: CPT | Performed by: FAMILY MEDICINE
